# Patient Record
Sex: MALE | Race: OTHER | HISPANIC OR LATINO
[De-identification: names, ages, dates, MRNs, and addresses within clinical notes are randomized per-mention and may not be internally consistent; named-entity substitution may affect disease eponyms.]

---

## 2017-01-10 ENCOUNTER — RESULT CHARGE (OUTPATIENT)
Age: 42
End: 2017-01-10

## 2017-01-11 ENCOUNTER — APPOINTMENT (OUTPATIENT)
Dept: OTHER | Facility: CLINIC | Age: 42
End: 2017-01-11

## 2017-01-11 VITALS
BODY MASS INDEX: 34.72 KG/M2 | DIASTOLIC BLOOD PRESSURE: 104 MMHG | WEIGHT: 262 LBS | OXYGEN SATURATION: 97 % | HEIGHT: 73 IN | HEART RATE: 98 BPM | SYSTOLIC BLOOD PRESSURE: 155 MMHG

## 2017-01-12 ENCOUNTER — MEDICATION RENEWAL (OUTPATIENT)
Age: 42
End: 2017-01-12

## 2017-01-12 LAB
ALBUMIN SERPL ELPH-MCNC: 4.2 G/DL
ALP BLD-CCNC: 80 U/L
ALT SERPL-CCNC: 38 U/L
ANION GAP SERPL CALC-SCNC: 16 MMOL/L
APPEARANCE: CLEAR
AST SERPL-CCNC: 51 U/L
BASOPHILS # BLD AUTO: 0.01 K/UL
BASOPHILS NFR BLD AUTO: 0.2 %
BILIRUB SERPL-MCNC: 0.2 MG/DL
BILIRUBIN URINE: NEGATIVE
BLOOD URINE: NEGATIVE
BUN SERPL-MCNC: 10 MG/DL
CALCIUM SERPL-MCNC: 9.4 MG/DL
CHLORIDE SERPL-SCNC: 99 MMOL/L
CHOLEST SERPL-MCNC: 227 MG/DL
CHOLEST/HDLC SERPL: 5.7 RATIO
CO2 SERPL-SCNC: 23 MMOL/L
COLOR: YELLOW
CREAT SERPL-MCNC: 1.36 MG/DL
EOSINOPHIL # BLD AUTO: 0.03 K/UL
EOSINOPHIL NFR BLD AUTO: 0.5 %
GLUCOSE QUALITATIVE U: NORMAL MG/DL
GLUCOSE SERPL-MCNC: 76 MG/DL
HCT VFR BLD CALC: 52 %
HDLC SERPL-MCNC: 40 MG/DL
HGB BLD-MCNC: 17.4 G/DL
IMM GRANULOCYTES NFR BLD AUTO: 0.2 %
KETONES URINE: NEGATIVE
LDLC SERPL CALC-MCNC: 167 MG/DL
LEUKOCYTE ESTERASE URINE: NEGATIVE
LYMPHOCYTES # BLD AUTO: 1.12 K/UL
LYMPHOCYTES NFR BLD AUTO: 18.6 %
MAN DIFF?: NORMAL
MCHC RBC-ENTMCNC: 28.8 PG
MCHC RBC-ENTMCNC: 33.5 GM/DL
MCV RBC AUTO: 86.1 FL
MONOCYTES # BLD AUTO: 0.77 K/UL
MONOCYTES NFR BLD AUTO: 12.8 %
NEUTROPHILS # BLD AUTO: 4.08 K/UL
NEUTROPHILS NFR BLD AUTO: 67.7 %
NITRITE URINE: NEGATIVE
PH URINE: 7
PLATELET # BLD AUTO: 228 K/UL
POTASSIUM SERPL-SCNC: 4.6 MMOL/L
PROT SERPL-MCNC: 6.7 G/DL
PROTEIN URINE: NEGATIVE MG/DL
RBC # BLD: 6.04 M/UL
RBC # FLD: 14.8 %
SODIUM SERPL-SCNC: 138 MMOL/L
SPECIFIC GRAVITY URINE: 1.02
TRIGL SERPL-MCNC: 98 MG/DL
TSH SERPL-ACNC: 3.7 UIU/ML
UROBILINOGEN URINE: NORMAL MG/DL
WBC # FLD AUTO: 6.02 K/UL

## 2017-01-19 ENCOUNTER — APPOINTMENT (OUTPATIENT)
Dept: SURGERY | Facility: CLINIC | Age: 42
End: 2017-01-19

## 2017-01-25 ENCOUNTER — OTHER (OUTPATIENT)
Age: 42
End: 2017-01-25

## 2017-01-25 ENCOUNTER — OUTPATIENT (OUTPATIENT)
Dept: OUTPATIENT SERVICES | Facility: HOSPITAL | Age: 42
LOS: 1 days | Discharge: HOME | End: 2017-01-25

## 2017-01-25 ENCOUNTER — RESULT REVIEW (OUTPATIENT)
Age: 42
End: 2017-01-25

## 2017-01-25 DIAGNOSIS — G50.0 TRIGEMINAL NEURALGIA: ICD-10-CM

## 2017-01-26 ENCOUNTER — APPOINTMENT (OUTPATIENT)
Dept: HEMATOLOGY ONCOLOGY | Facility: CLINIC | Age: 42
End: 2017-01-26

## 2017-01-26 ENCOUNTER — LABORATORY RESULT (OUTPATIENT)
Age: 42
End: 2017-01-26

## 2017-01-26 VITALS
DIASTOLIC BLOOD PRESSURE: 80 MMHG | BODY MASS INDEX: 34.32 KG/M2 | OXYGEN SATURATION: 97 % | WEIGHT: 260.15 LBS | HEART RATE: 89 BPM | TEMPERATURE: 97.5 F | RESPIRATION RATE: 16 BRPM | SYSTOLIC BLOOD PRESSURE: 138 MMHG

## 2017-01-26 LAB
HCT VFR BLD CALC: 54.9 % — HIGH (ref 39–50)
HGB BLD-MCNC: 17.8 G/DL — HIGH (ref 13–17)
MCHC RBC-ENTMCNC: 27.9 PG — SIGNIFICANT CHANGE UP (ref 27–34)
MCHC RBC-ENTMCNC: 32.4 GM/DL — SIGNIFICANT CHANGE UP (ref 32–36)
MCV RBC AUTO: 86.2 FL — SIGNIFICANT CHANGE UP (ref 80–100)
PLATELET # BLD AUTO: 239 K/UL — SIGNIFICANT CHANGE UP (ref 150–400)
RBC # BLD: 6.37 M/UL — HIGH (ref 4.2–5.8)
RBC # FLD: 12.9 % — SIGNIFICANT CHANGE UP (ref 10.3–14.5)
WBC # BLD: 5.1 K/UL — SIGNIFICANT CHANGE UP (ref 3.8–10.5)
WBC # FLD AUTO: 5.1 K/UL — SIGNIFICANT CHANGE UP (ref 3.8–10.5)

## 2017-02-01 ENCOUNTER — FORM ENCOUNTER (OUTPATIENT)
Age: 42
End: 2017-02-01

## 2017-02-28 ENCOUNTER — MESSAGE (OUTPATIENT)
Age: 42
End: 2017-02-28

## 2017-03-01 ENCOUNTER — MESSAGE (OUTPATIENT)
Age: 42
End: 2017-03-01

## 2017-04-18 ENCOUNTER — APPOINTMENT (OUTPATIENT)
Dept: ENDOCRINOLOGY | Facility: CLINIC | Age: 42
End: 2017-04-18

## 2017-04-18 VITALS
DIASTOLIC BLOOD PRESSURE: 80 MMHG | HEIGHT: 73 IN | OXYGEN SATURATION: 97 % | BODY MASS INDEX: 33.93 KG/M2 | SYSTOLIC BLOOD PRESSURE: 120 MMHG | HEART RATE: 88 BPM | WEIGHT: 256 LBS

## 2017-04-18 RX ORDER — LOSARTAN POTASSIUM AND HYDROCHLOROTHIAZIDE 100; 12.5 MG/1; MG/1
TABLET, FILM COATED ORAL
Refills: 0 | Status: DISCONTINUED | COMMUNITY
End: 2017-04-18

## 2017-04-18 RX ORDER — METOPROLOL TARTRATE 75 MG/1
TABLET, FILM COATED ORAL
Refills: 0 | Status: DISCONTINUED | COMMUNITY
End: 2017-04-18

## 2017-05-19 ENCOUNTER — RX RENEWAL (OUTPATIENT)
Age: 42
End: 2017-05-19

## 2017-05-22 ENCOUNTER — RX RENEWAL (OUTPATIENT)
Age: 42
End: 2017-05-22

## 2017-06-27 ENCOUNTER — APPOINTMENT (OUTPATIENT)
Dept: SURGERY | Facility: CLINIC | Age: 42
End: 2017-06-27

## 2017-07-12 ENCOUNTER — APPOINTMENT (OUTPATIENT)
Dept: OTHER | Facility: CLINIC | Age: 42
End: 2017-07-12

## 2017-07-12 VITALS
WEIGHT: 256 LBS | OXYGEN SATURATION: 96 % | HEART RATE: 91 BPM | DIASTOLIC BLOOD PRESSURE: 90 MMHG | SYSTOLIC BLOOD PRESSURE: 136 MMHG | BODY MASS INDEX: 33.93 KG/M2 | HEIGHT: 73 IN

## 2017-07-12 VITALS — SYSTOLIC BLOOD PRESSURE: 142 MMHG | DIASTOLIC BLOOD PRESSURE: 82 MMHG

## 2017-07-12 RX ORDER — LOSARTAN POTASSIUM AND HYDROCHLOROTHIAZIDE 12.5; 5 MG/1; MG/1
50-12.5 TABLET ORAL
Qty: 30 | Refills: 0 | Status: COMPLETED | COMMUNITY
Start: 2017-03-14

## 2017-07-12 RX ORDER — AMOXICILLIN 500 MG/1
500 CAPSULE ORAL
Qty: 10 | Refills: 0 | Status: COMPLETED | COMMUNITY
Start: 2017-01-28

## 2017-07-12 RX ORDER — AMOXICILLIN AND CLAVULANATE POTASSIUM 500; 125 MG/1; MG/1
500-125 TABLET, FILM COATED ORAL
Qty: 20 | Refills: 0 | Status: COMPLETED | COMMUNITY
Start: 2017-05-01

## 2017-07-12 RX ORDER — POLYETHYLENE GLYCOL-3350 AND ELECTROLYTES 236; 6.74; 5.86; 2.97; 22.74 G/274.31G; G/274.31G; G/274.31G; G/274.31G; G/274.31G
236 POWDER, FOR SOLUTION ORAL
Qty: 4000 | Refills: 0 | Status: COMPLETED | COMMUNITY
Start: 2017-05-02

## 2017-07-12 RX ORDER — TESTOSTERONE 30 MG/1.5ML
30 SOLUTION TOPICAL
Qty: 90 | Refills: 0 | Status: COMPLETED | COMMUNITY
Start: 2017-02-08

## 2017-07-12 RX ORDER — POLYETHYLENE GLYCOL 3350 17 G/17G
17 POWDER, FOR SOLUTION ORAL
Qty: 30 | Refills: 0 | Status: COMPLETED | COMMUNITY
Start: 2017-05-02

## 2017-07-31 ENCOUNTER — RX RENEWAL (OUTPATIENT)
Age: 42
End: 2017-07-31

## 2017-08-16 ENCOUNTER — OUTPATIENT (OUTPATIENT)
Dept: OUTPATIENT SERVICES | Facility: HOSPITAL | Age: 42
LOS: 1 days | Discharge: HOME | End: 2017-08-16

## 2017-08-16 DIAGNOSIS — G50.0 TRIGEMINAL NEURALGIA: ICD-10-CM

## 2017-08-17 ENCOUNTER — CLINICAL ADVICE (OUTPATIENT)
Age: 42
End: 2017-08-17

## 2017-08-18 ENCOUNTER — APPOINTMENT (OUTPATIENT)
Dept: HEMATOLOGY ONCOLOGY | Facility: CLINIC | Age: 42
End: 2017-08-18
Payer: COMMERCIAL

## 2017-08-18 VITALS
RESPIRATION RATE: 16 BRPM | TEMPERATURE: 97.9 F | SYSTOLIC BLOOD PRESSURE: 137 MMHG | HEART RATE: 82 BPM | DIASTOLIC BLOOD PRESSURE: 94 MMHG | OXYGEN SATURATION: 95 % | WEIGHT: 264.33 LBS | BODY MASS INDEX: 34.87 KG/M2

## 2017-08-18 DIAGNOSIS — G50.0 TRIGEMINAL NEURALGIA: ICD-10-CM

## 2017-08-18 PROCEDURE — 99215 OFFICE O/P EST HI 40 MIN: CPT

## 2017-08-29 ENCOUNTER — OTHER (OUTPATIENT)
Age: 42
End: 2017-08-29

## 2017-09-18 ENCOUNTER — RX RENEWAL (OUTPATIENT)
Age: 42
End: 2017-09-18

## 2017-09-19 ENCOUNTER — OUTPATIENT (OUTPATIENT)
Dept: OUTPATIENT SERVICES | Facility: HOSPITAL | Age: 42
LOS: 1 days | Discharge: HOME | End: 2017-09-19

## 2017-09-19 DIAGNOSIS — G50.0 TRIGEMINAL NEURALGIA: ICD-10-CM

## 2017-09-21 ENCOUNTER — APPOINTMENT (OUTPATIENT)
Dept: HEMATOLOGY ONCOLOGY | Facility: CLINIC | Age: 42
End: 2017-09-21
Payer: COMMERCIAL

## 2017-09-21 VITALS
HEART RATE: 90 BPM | TEMPERATURE: 97.8 F | WEIGHT: 270.95 LBS | BODY MASS INDEX: 35.75 KG/M2 | OXYGEN SATURATION: 96 % | SYSTOLIC BLOOD PRESSURE: 145 MMHG | RESPIRATION RATE: 16 BRPM | DIASTOLIC BLOOD PRESSURE: 84 MMHG

## 2017-09-21 DIAGNOSIS — R20.8 OTHER DISTURBANCES OF SKIN SENSATION: ICD-10-CM

## 2017-09-21 DIAGNOSIS — E29.1 TESTICULAR HYPOFUNCTION: ICD-10-CM

## 2017-09-21 PROCEDURE — 99215 OFFICE O/P EST HI 40 MIN: CPT

## 2017-10-03 PROBLEM — E29.1 TESTOSTERONE DEFICIENCY: Status: ACTIVE | Noted: 2017-04-18

## 2017-10-03 PROBLEM — R20.8 DYSESTHESIA: Status: ACTIVE | Noted: 2017-01-30

## 2017-10-10 ENCOUNTER — RX RENEWAL (OUTPATIENT)
Age: 42
End: 2017-10-10

## 2017-10-16 ENCOUNTER — APPOINTMENT (OUTPATIENT)
Dept: ENDOCRINOLOGY | Facility: CLINIC | Age: 42
End: 2017-10-16
Payer: COMMERCIAL

## 2017-10-16 VITALS
HEIGHT: 72 IN | DIASTOLIC BLOOD PRESSURE: 80 MMHG | BODY MASS INDEX: 36.44 KG/M2 | OXYGEN SATURATION: 98 % | WEIGHT: 269 LBS | HEART RATE: 80 BPM | SYSTOLIC BLOOD PRESSURE: 120 MMHG

## 2017-10-16 PROCEDURE — 76536 US EXAM OF HEAD AND NECK: CPT

## 2017-10-16 PROCEDURE — 99214 OFFICE O/P EST MOD 30 MIN: CPT | Mod: 25

## 2017-10-16 RX ORDER — CHORIONIC GONADOTROPIN 10000 UNIT
10000 KIT INTRAMUSCULAR
Qty: 2 | Refills: 0 | Status: COMPLETED | COMMUNITY
Start: 2017-03-02 | End: 2017-10-16

## 2017-10-16 RX ORDER — LEVOCETIRIZINE DIHYDROCHLORIDE 5 MG/1
5 TABLET ORAL
Qty: 30 | Refills: 0 | Status: COMPLETED | COMMUNITY
Start: 2017-05-03 | End: 2017-10-16

## 2017-10-16 RX ORDER — IBUPROFEN 800 MG/1
800 TABLET, FILM COATED ORAL
Qty: 60 | Refills: 0 | Status: DISCONTINUED | COMMUNITY
Start: 2017-10-10

## 2017-10-16 RX ORDER — DICLOFENAC SODIUM 10 MG/G
1 GEL TOPICAL
Qty: 300 | Refills: 0 | Status: DISCONTINUED | COMMUNITY
Start: 2017-09-29

## 2017-10-16 RX ORDER — CYCLOBENZAPRINE HYDROCHLORIDE 5 MG/1
5 TABLET, FILM COATED ORAL
Qty: 60 | Refills: 0 | Status: DISCONTINUED | COMMUNITY
Start: 2017-09-26

## 2017-10-18 ENCOUNTER — RX RENEWAL (OUTPATIENT)
Age: 42
End: 2017-10-18

## 2017-10-18 ENCOUNTER — RESULT REVIEW (OUTPATIENT)
Age: 42
End: 2017-10-18

## 2017-10-18 LAB
ACTH SER-ACNC: 18 PG/ML
CORTIS SERPL-MCNC: 8.4 UG/DL
FSH SERPL-MCNC: <0.1 IU/L
IGF-1 INTERP: NORMAL
IGF-I BLD-MCNC: 150 NG/ML
LH SERPL-ACNC: <0.1 IU/L
PROLACTIN SERPL-MCNC: 14.7 NG/ML
T4 FREE SERPL-MCNC: 1.4 NG/DL
TESTOST SERPL-MCNC: 64.4 NG/DL
TSH SERPL-ACNC: 4.86 UIU/ML

## 2017-10-20 LAB
MONOMERIC PROLACTIN (ICMA)*: 9 NG/ML
PERCENT MACROPROLACTIN: 59 %
PROLACTIN, SERUM (ICMA)*: 22 NG/ML

## 2017-12-12 ENCOUNTER — OTHER (OUTPATIENT)
Age: 42
End: 2017-12-12

## 2017-12-12 ENCOUNTER — RX RENEWAL (OUTPATIENT)
Age: 42
End: 2017-12-12

## 2017-12-19 ENCOUNTER — APPOINTMENT (OUTPATIENT)
Dept: SURGERY | Facility: CLINIC | Age: 42
End: 2017-12-19
Payer: COMMERCIAL

## 2017-12-19 PROCEDURE — 99213 OFFICE O/P EST LOW 20 MIN: CPT

## 2017-12-20 ENCOUNTER — APPOINTMENT (OUTPATIENT)
Dept: OTHER | Facility: CLINIC | Age: 42
End: 2017-12-20
Payer: COMMERCIAL

## 2017-12-20 VITALS — DIASTOLIC BLOOD PRESSURE: 110 MMHG | SYSTOLIC BLOOD PRESSURE: 130 MMHG

## 2017-12-20 VITALS
HEIGHT: 73 IN | WEIGHT: 260 LBS | DIASTOLIC BLOOD PRESSURE: 99 MMHG | SYSTOLIC BLOOD PRESSURE: 144 MMHG | HEART RATE: 83 BPM | OXYGEN SATURATION: 97 % | RESPIRATION RATE: 14 BRPM | BODY MASS INDEX: 34.46 KG/M2

## 2017-12-20 PROCEDURE — 94010 BREATHING CAPACITY TEST: CPT

## 2017-12-20 PROCEDURE — 96150: CPT

## 2017-12-20 PROCEDURE — 99214 OFFICE O/P EST MOD 30 MIN: CPT | Mod: 25

## 2017-12-20 PROCEDURE — 99396 PREV VISIT EST AGE 40-64: CPT

## 2017-12-20 RX ORDER — LEVOFLOXACIN 500 MG/1
500 TABLET, FILM COATED ORAL
Qty: 7 | Refills: 0 | Status: COMPLETED | COMMUNITY
Start: 2017-12-07 | End: 2017-12-20

## 2017-12-20 RX ORDER — PREDNISONE 20 MG/1
20 TABLET ORAL
Qty: 30 | Refills: 0 | Status: COMPLETED | COMMUNITY
Start: 2017-11-07 | End: 2017-12-20

## 2017-12-21 LAB
ALBUMIN SERPL ELPH-MCNC: 4.5 G/DL
ALP BLD-CCNC: 81 U/L
ALT SERPL-CCNC: 28 U/L
ANION GAP SERPL CALC-SCNC: 12 MMOL/L
APPEARANCE: CLEAR
AST SERPL-CCNC: 29 U/L
BASOPHILS # BLD AUTO: 0.02 K/UL
BASOPHILS NFR BLD AUTO: 0.5 %
BILIRUB SERPL-MCNC: 0.3 MG/DL
BILIRUBIN URINE: NEGATIVE
BLOOD URINE: NEGATIVE
BUN SERPL-MCNC: 15 MG/DL
CALCIUM SERPL-MCNC: 9.8 MG/DL
CHLORIDE SERPL-SCNC: 101 MMOL/L
CHOLEST SERPL-MCNC: 241 MG/DL
CHOLEST/HDLC SERPL: 5.5 RATIO
CO2 SERPL-SCNC: 29 MMOL/L
COLOR: YELLOW
CREAT SERPL-MCNC: 1.28 MG/DL
EOSINOPHIL # BLD AUTO: 0.04 K/UL
EOSINOPHIL NFR BLD AUTO: 0.9
GLUCOSE QUALITATIVE U: NEGATIVE MG/DL
GLUCOSE SERPL-MCNC: 94 MG/DL
HCT VFR BLD CALC: 53.3 %
HDLC SERPL-MCNC: 44 MG/DL
HGB BLD-MCNC: 17.7 G/DL
IMM GRANULOCYTES NFR BLD AUTO: 0.2 %
KETONES URINE: NEGATIVE
LDLC SERPL CALC-MCNC: 182 MG/DL
LEUKOCYTE ESTERASE URINE: NEGATIVE
LYMPHOCYTES # BLD AUTO: 1.14 K/UL
LYMPHOCYTES NFR BLD AUTO: 27 %
MAN DIFF?: NORMAL
MCHC RBC-ENTMCNC: 29.1 PG
MCHC RBC-ENTMCNC: 33.2 GM/DL
MCV RBC AUTO: 87.7 FL
MONOCYTES # BLD AUTO: 0.37 K/UL
MONOCYTES NFR BLD AUTO: 8.7 %
NEUTROPHILS # BLD AUTO: 2.65 K/UL
NEUTROPHILS NFR BLD AUTO: 62.7 %
NITRITE URINE: NEGATIVE
PH URINE: 7
PLATELET # BLD AUTO: 202 K/UL
POTASSIUM SERPL-SCNC: 4.8 MMOL/L
PROT SERPL-MCNC: 7 G/DL
PROTEIN URINE: NEGATIVE MG/DL
RBC # BLD: 6.08 M/UL
RBC # FLD: 15.3 %
SODIUM SERPL-SCNC: 142 MMOL/L
SPECIFIC GRAVITY URINE: 1.02
TRIGL SERPL-MCNC: 77 MG/DL
UROBILINOGEN URINE: NEGATIVE MG/DL
WBC # FLD AUTO: 4.23 K/UL

## 2018-01-12 ENCOUNTER — APPOINTMENT (OUTPATIENT)
Dept: PSYCHIATRY | Facility: CLINIC | Age: 43
End: 2018-01-12
Payer: COMMERCIAL

## 2018-01-12 PROCEDURE — 90791 PSYCH DIAGNOSTIC EVALUATION: CPT

## 2018-03-12 ENCOUNTER — RX RENEWAL (OUTPATIENT)
Age: 43
End: 2018-03-12

## 2018-04-09 ENCOUNTER — APPOINTMENT (OUTPATIENT)
Dept: ENDOCRINOLOGY | Facility: CLINIC | Age: 43
End: 2018-04-09
Payer: COMMERCIAL

## 2018-04-09 VITALS
WEIGHT: 261 LBS | BODY MASS INDEX: 34.59 KG/M2 | HEIGHT: 73 IN | DIASTOLIC BLOOD PRESSURE: 80 MMHG | OXYGEN SATURATION: 99 % | HEART RATE: 94 BPM | SYSTOLIC BLOOD PRESSURE: 120 MMHG

## 2018-04-09 PROCEDURE — 99214 OFFICE O/P EST MOD 30 MIN: CPT

## 2018-04-09 RX ORDER — LEVOTHYROXINE SODIUM 0.1 MG/1
100 TABLET ORAL
Qty: 30 | Refills: 0 | Status: DISCONTINUED | COMMUNITY
Start: 2017-09-18

## 2018-04-09 RX ORDER — ZOLPIDEM TARTRATE 10 MG/1
10 TABLET ORAL
Qty: 30 | Refills: 0 | Status: DISCONTINUED | COMMUNITY
Start: 2018-02-01

## 2018-04-09 RX ORDER — PREDNISONE 50 MG/1
50 TABLET ORAL
Qty: 8 | Refills: 0 | Status: DISCONTINUED | COMMUNITY
Start: 2018-03-01

## 2018-04-09 RX ORDER — AZITHROMYCIN 250 MG/1
250 TABLET, FILM COATED ORAL
Qty: 6 | Refills: 0 | Status: DISCONTINUED | COMMUNITY
Start: 2018-02-01

## 2018-04-09 RX ORDER — CLOBETASOL PROPIONATE 0.5 MG/G
0.05 OINTMENT TOPICAL
Qty: 60 | Refills: 0 | Status: DISCONTINUED | COMMUNITY
Start: 2018-02-20

## 2018-04-09 RX ORDER — PROMETHAZINE HYDROCHLORIDE AND CODEINE PHOSPHATE 6.25; 1 MG/5ML; MG/5ML
6.25-1 SOLUTION ORAL
Qty: 100 | Refills: 0 | Status: DISCONTINUED | COMMUNITY
Start: 2018-02-01

## 2018-04-09 RX ORDER — MIRTAZAPINE 45 MG/1
45 TABLET, FILM COATED ORAL
Qty: 30 | Refills: 0 | Status: DISCONTINUED | COMMUNITY
Start: 2018-03-02

## 2018-04-12 ENCOUNTER — RX RENEWAL (OUTPATIENT)
Age: 43
End: 2018-04-12

## 2018-04-12 LAB
T4 FREE SERPL-MCNC: 1.4 NG/DL
TSH SERPL-ACNC: 4.31 UIU/ML

## 2018-04-24 ENCOUNTER — OTHER (OUTPATIENT)
Age: 43
End: 2018-04-24

## 2018-05-07 ENCOUNTER — OTHER (OUTPATIENT)
Age: 43
End: 2018-05-07

## 2018-05-16 ENCOUNTER — RX RENEWAL (OUTPATIENT)
Age: 43
End: 2018-05-16

## 2018-06-15 ENCOUNTER — APPOINTMENT (OUTPATIENT)
Dept: OTHER | Facility: CLINIC | Age: 43
End: 2018-06-15
Payer: COMMERCIAL

## 2018-06-15 VITALS
OXYGEN SATURATION: 98 % | HEART RATE: 76 BPM | RESPIRATION RATE: 14 BRPM | HEIGHT: 73 IN | BODY MASS INDEX: 33.8 KG/M2 | DIASTOLIC BLOOD PRESSURE: 79 MMHG | SYSTOLIC BLOOD PRESSURE: 115 MMHG | WEIGHT: 255 LBS

## 2018-06-15 DIAGNOSIS — C44.329 SQUAMOUS CELL CARCINOMA OF SKIN OF OTHER PARTS OF FACE: ICD-10-CM

## 2018-06-15 DIAGNOSIS — Z03.89 ENCOUNTER FOR OBSERVATION FOR OTHER SUSPECTED DISEASES AND CONDITIONS RULED OUT: ICD-10-CM

## 2018-06-15 PROCEDURE — 99215 OFFICE O/P EST HI 40 MIN: CPT

## 2018-06-15 RX ORDER — CARBAMAZEPINE 200 MG/1
200 TABLET ORAL
Qty: 10 | Refills: 0 | Status: COMPLETED | COMMUNITY
Start: 2018-05-16 | End: 2018-06-15

## 2018-06-15 RX ORDER — LEVOTHYROXINE SODIUM 0.11 MG/1
112 TABLET ORAL
Qty: 30 | Refills: 0 | Status: COMPLETED | COMMUNITY
Start: 2017-10-18

## 2018-06-15 RX ORDER — TESTOSTERONE CYPIONATE 200 MG/ML
200 INJECTION, SOLUTION INTRAMUSCULAR
Refills: 0 | Status: COMPLETED | COMMUNITY
End: 2018-06-15

## 2018-06-15 RX ORDER — METHYLPREDNISOLONE 4 MG/1
4 TABLET ORAL
Qty: 21 | Refills: 0 | Status: COMPLETED | COMMUNITY
Start: 2018-05-09

## 2018-06-19 ENCOUNTER — APPOINTMENT (OUTPATIENT)
Dept: SURGERY | Facility: CLINIC | Age: 43
End: 2018-06-19
Payer: COMMERCIAL

## 2018-06-19 PROCEDURE — 31575 DIAGNOSTIC LARYNGOSCOPY: CPT

## 2018-06-19 PROCEDURE — 99214 OFFICE O/P EST MOD 30 MIN: CPT | Mod: 25

## 2018-09-18 ENCOUNTER — APPOINTMENT (OUTPATIENT)
Dept: ENDOCRINOLOGY | Facility: CLINIC | Age: 43
End: 2018-09-18
Payer: COMMERCIAL

## 2018-09-18 VITALS
OXYGEN SATURATION: 98 % | DIASTOLIC BLOOD PRESSURE: 70 MMHG | WEIGHT: 249 LBS | BODY MASS INDEX: 33 KG/M2 | SYSTOLIC BLOOD PRESSURE: 120 MMHG | HEIGHT: 73 IN | HEART RATE: 87 BPM

## 2018-09-18 PROCEDURE — 76536 US EXAM OF HEAD AND NECK: CPT

## 2018-09-18 PROCEDURE — 99214 OFFICE O/P EST MOD 30 MIN: CPT | Mod: 25

## 2018-09-18 RX ORDER — CHORIONIC GONADOTROPIN 10000 UNIT
10000 KIT INTRAMUSCULAR
Refills: 0 | Status: COMPLETED | COMMUNITY
End: 2018-09-18

## 2018-09-26 ENCOUNTER — RX RENEWAL (OUTPATIENT)
Age: 43
End: 2018-09-26

## 2018-09-26 LAB
ALBUMIN SERPL ELPH-MCNC: 4.7 G/DL
ALP BLD-CCNC: 91 U/L
ALT SERPL-CCNC: 54 U/L
ANION GAP SERPL CALC-SCNC: 12 MMOL/L
AST SERPL-CCNC: 41 U/L
BASOPHILS # BLD AUTO: 0.01 K/UL
BASOPHILS NFR BLD AUTO: 0.2 %
BILIRUB SERPL-MCNC: 0.3 MG/DL
BUN SERPL-MCNC: 18 MG/DL
CALCIUM SERPL-MCNC: 10.2 MG/DL
CHLORIDE SERPL-SCNC: 101 MMOL/L
CHOLEST SERPL-MCNC: 211 MG/DL
CHOLEST/HDLC SERPL: 4 RATIO
CO2 SERPL-SCNC: 29 MMOL/L
CREAT SERPL-MCNC: 1.66 MG/DL
EOSINOPHIL # BLD AUTO: 0.08 K/UL
EOSINOPHIL NFR BLD AUTO: 1.6 %
FSH SERPL-MCNC: 3.4 IU/L
GLUCOSE SERPL-MCNC: 78 MG/DL
HBA1C MFR BLD HPLC: 5.3 %
HCT VFR BLD CALC: 51.6 %
HDLC SERPL-MCNC: 53 MG/DL
HGB BLD-MCNC: 17 G/DL
IMM GRANULOCYTES NFR BLD AUTO: 0.2 %
LDLC SERPL CALC-MCNC: 131 MG/DL
LH SERPL-ACNC: 5 IU/L
LYMPHOCYTES # BLD AUTO: 1.62 K/UL
LYMPHOCYTES NFR BLD AUTO: 32.2 %
MAN DIFF?: NORMAL
MCHC RBC-ENTMCNC: 29.3 PG
MCHC RBC-ENTMCNC: 32.9 GM/DL
MCV RBC AUTO: 88.8 FL
MONOCYTES # BLD AUTO: 0.43 K/UL
MONOCYTES NFR BLD AUTO: 8.5 %
NEUTROPHILS # BLD AUTO: 2.88 K/UL
NEUTROPHILS NFR BLD AUTO: 57.3 %
PLATELET # BLD AUTO: 227 K/UL
POTASSIUM SERPL-SCNC: 4.4 MMOL/L
PROT SERPL-MCNC: 7.4 G/DL
RBC # BLD: 5.81 M/UL
RBC # FLD: 14.1 %
SHBG SERPL-SCNC: 21 NMOL/L
SODIUM SERPL-SCNC: 142 MMOL/L
T4 FREE SERPL-MCNC: 1.8 NG/DL
TESTOST SERPL-MCNC: 230.9 NG/DL
TRIGL SERPL-MCNC: 137 MG/DL
TSH SERPL-ACNC: 4.15 UIU/ML
WBC # FLD AUTO: 5.03 K/UL

## 2018-10-15 ENCOUNTER — RX RENEWAL (OUTPATIENT)
Age: 43
End: 2018-10-15

## 2018-11-28 ENCOUNTER — APPOINTMENT (OUTPATIENT)
Dept: OTHER | Facility: CLINIC | Age: 43
End: 2018-11-28
Payer: COMMERCIAL

## 2018-11-28 VITALS
SYSTOLIC BLOOD PRESSURE: 130 MMHG | HEIGHT: 73 IN | OXYGEN SATURATION: 98 % | BODY MASS INDEX: 33.66 KG/M2 | RESPIRATION RATE: 16 BRPM | HEART RATE: 103 BPM | DIASTOLIC BLOOD PRESSURE: 100 MMHG | WEIGHT: 254 LBS

## 2018-11-28 PROCEDURE — 99214 OFFICE O/P EST MOD 30 MIN: CPT | Mod: 25

## 2018-11-28 PROCEDURE — 94010 BREATHING CAPACITY TEST: CPT

## 2018-11-28 PROCEDURE — 96150: CPT

## 2018-11-28 PROCEDURE — 99396 PREV VISIT EST AGE 40-64: CPT | Mod: 25

## 2018-11-28 RX ORDER — PENTOXIFYLLINE 400 MG/1
400 TABLET, EXTENDED RELEASE ORAL
Qty: 60 | Refills: 0 | Status: COMPLETED | COMMUNITY
Start: 2018-05-30 | End: 2018-11-28

## 2018-11-28 RX ORDER — BACLOFEN 10 MG/1
10 TABLET ORAL
Refills: 0 | Status: ACTIVE | COMMUNITY

## 2018-11-28 RX ORDER — 1.1% SODIUM FLUORIDE PRESCRIPTION DENTAL CREAM 5 MG/G
1.1 CREAM DENTAL
Qty: 51 | Refills: 0 | Status: ACTIVE | COMMUNITY
Start: 2018-11-13

## 2018-11-29 ENCOUNTER — FORM ENCOUNTER (OUTPATIENT)
Age: 43
End: 2018-11-29

## 2018-12-03 LAB
ALBUMIN SERPL ELPH-MCNC: 4.3 G/DL
ALP BLD-CCNC: 70 U/L
ALT SERPL-CCNC: 22 U/L
ANION GAP SERPL CALC-SCNC: 9 MMOL/L
APPEARANCE: CLEAR
AST SERPL-CCNC: 27 U/L
BACTERIA: NEGATIVE
BASOPHILS # BLD AUTO: 0.01 K/UL
BASOPHILS NFR BLD AUTO: 0.2 %
BILIRUB SERPL-MCNC: 0.4 MG/DL
BILIRUBIN URINE: NEGATIVE
BLOOD URINE: NEGATIVE
BUN SERPL-MCNC: 18 MG/DL
CALCIUM SERPL-MCNC: 9.6 MG/DL
CHLORIDE SERPL-SCNC: 105 MMOL/L
CHOLEST SERPL-MCNC: 193 MG/DL
CHOLEST/HDLC SERPL: 4.1 RATIO
CO2 SERPL-SCNC: 30 MMOL/L
COLOR: YELLOW
CREAT SERPL-MCNC: 1.35 MG/DL
EOSINOPHIL # BLD AUTO: 0.03 K/UL
EOSINOPHIL NFR BLD AUTO: 0.7 %
GLUCOSE QUALITATIVE U: NEGATIVE MG/DL
GLUCOSE SERPL-MCNC: 91 MG/DL
HCT VFR BLD CALC: 51.7 %
HDLC SERPL-MCNC: 47 MG/DL
HGB BLD-MCNC: 16.9 G/DL
HYALINE CASTS: 1 /LPF
IMM GRANULOCYTES NFR BLD AUTO: 0.2 %
KETONES URINE: NEGATIVE
LDLC SERPL CALC-MCNC: 123 MG/DL
LEUKOCYTE ESTERASE URINE: NEGATIVE
LYMPHOCYTES # BLD AUTO: 1.33 K/UL
LYMPHOCYTES NFR BLD AUTO: 30.6 %
MAN DIFF?: NORMAL
MCHC RBC-ENTMCNC: 28.7 PG
MCHC RBC-ENTMCNC: 32.7 GM/DL
MCV RBC AUTO: 87.9 FL
MICROSCOPIC-UA: NORMAL
MONOCYTES # BLD AUTO: 0.34 K/UL
MONOCYTES NFR BLD AUTO: 7.8 %
NEUTROPHILS # BLD AUTO: 2.62 K/UL
NEUTROPHILS NFR BLD AUTO: 60.5 %
NITRITE URINE: NEGATIVE
PH URINE: 6
PLATELET # BLD AUTO: 185 K/UL
POTASSIUM SERPL-SCNC: 4.5 MMOL/L
PROT SERPL-MCNC: 6.4 G/DL
PROTEIN URINE: NEGATIVE MG/DL
RBC # BLD: 5.88 M/UL
RBC # FLD: 13.9 %
RED BLOOD CELLS URINE: 2 /HPF
SODIUM SERPL-SCNC: 144 MMOL/L
SPECIFIC GRAVITY URINE: 1.02
SQUAMOUS EPITHELIAL CELLS: 0 /HPF
TRIGL SERPL-MCNC: 114 MG/DL
UROBILINOGEN URINE: NEGATIVE MG/DL
WBC # FLD AUTO: 4.34 K/UL
WHITE BLOOD CELLS URINE: 1 /HPF

## 2018-12-20 ENCOUNTER — APPOINTMENT (OUTPATIENT)
Dept: SURGERY | Facility: CLINIC | Age: 43
End: 2018-12-20
Payer: COMMERCIAL

## 2018-12-20 PROCEDURE — 99213 OFFICE O/P EST LOW 20 MIN: CPT

## 2019-01-14 ENCOUNTER — APPOINTMENT (OUTPATIENT)
Dept: ENDOCRINOLOGY | Facility: CLINIC | Age: 44
End: 2019-01-14
Payer: COMMERCIAL

## 2019-01-14 VITALS
WEIGHT: 251 LBS | OXYGEN SATURATION: 98 % | SYSTOLIC BLOOD PRESSURE: 120 MMHG | HEART RATE: 97 BPM | DIASTOLIC BLOOD PRESSURE: 80 MMHG | BODY MASS INDEX: 33.12 KG/M2

## 2019-01-14 PROCEDURE — 99214 OFFICE O/P EST MOD 30 MIN: CPT

## 2019-01-14 NOTE — DATA REVIEWED
[FreeTextEntry1] : Labs 11/28/18:\par Cr 1.35\par CMP normal\par \par HDL 47\par Chol 193\par Trig 114\par \par Thyroid Ultrasound Report 9/18/18:\par \par Technique: multiple real time longitudinal and transverse images were obtained using a high resolution ultrasound with a linear transducer, [employer] e 2008 model, 10-12 MHz frequencies. All measurements will be reported as longitudinal x scott-posterior x transverse. \par Comparison: Report dated 10/2017. \par \par Indication: multinodular goiter. \par \par Findings: \par The right thyroid lobe measures 3.23 x 1.98 x 1.40 cm. The left thyroid lobe measures 3.51 x 1.44 x 1.66 cm. The isthmus measures 0.31 cm. \par The right thyroid lobe has a heterogeneous parenchyma. \par The left thyroid lobe has a heterogeneous parenchyma . \par  \par In the right lower pole there is a  predominantly solid, with isoechoic solid component. It measures 1.06 x 0.83 x 0.94 cm. The nodule is round in shape and the border is smooth. The nodule has a halo. It demonstrates macrocalcifications. It has peripheral and scant internal vascularity. \par \par In the left lower pole there is a  mixed, predominantly solid, with isoechoic solid component. It measures 1.05 x 0.74 x 0.84 cm. The nodule is round in shape and the border is smooth. The nodule has a halo. It demonstrates macrocalcifications. It has peripheral and scant internal vascularity. \par \par In the right mid pole there is a  predominantly solid. It measures 1.11 x 0.70 x 0.81 cm. The nodule is ovoid in shape and the border is smooth. The nodule has a halo. It demonstrates no calcifications. It has peripheral vascularity. \par \par In the right mid pole there is a  mixed. It measures 0.83 x 0.82 x 1.05 cm. The nodule is round in shape and the border is smooth. The nodule has a halo. It has peripheral vascularity. \par \par \par Labs 4/2018:\par TSH 4.31\par Free T4 1.4\par \par Labs 12/2017\par Hb 17.7\par CMP normal\par \par HDL 44\par Chol 241\par Trig 77\par \par Labs 10/2017:\par Cortisol 8.4\par TSH 4.86\par Free T4 1.4\par ACTH 18\par Prolactin 14.7\par FSH <0.1\par LH < 0.1\par IGF-1 150\par Testosterone 64.4\par \par \par Thyroid Ultrasound Report 10/2017\par \par Technique: multiple real time longitudinal and transverse images were obtained using a high resolution ultrasound with a linear transducer, FreeDrive e 2008 model, 10-12 MHz frequencies. All measurements will be reported as longitudinal x scott-posterior x transverse. \par Comparison: Report dated 10/2016. \par \par Indication: multinodular goiter. \par \par Findings: \par The right thyroid lobe measures 2.53 x 1.38 x 1.34 cm. The left thyroid lobe measures 3.41 x 1.18 x 0.99 cm. The isthmus measures 0.26 cm. \par The right thyroid lobe has a heterogeneous parenchyma. \par The left thyroid lobe has a heterogeneous parenchyma . \par  \par In the right lower pole there is a  predominantly solid, with isoechoic solid component. It measures 1.45 x 0.87 x 0.87 cm. The nodule is round in shape and the border is smooth. The nodule has a halo. It demonstrates macrocalcifications. It has peripheral and scant internal vascularity. \par \par In the left lower pole there is a  mixed, predominantly solid, with isoechoic solid component. It measures 0.66 x 0.75 x 0.85 cm. The nodule is round in shape and the border is smooth. The nodule has a halo. It demonstrates macrocalcifications. It has peripheral and scant internal vascularity. \par Labs 1/11/17:\par \par HDL 40\par Chol 227\par Trig 98\par Creatinine 1.36\par CMP otherwise normal except AST of 51\par TSH 3.70\par \par Pathology Right Lower Pole Thyroid Nodule 10/2016: Benign\par \par Thyroid Ultrasound Report 10/18/16:\par \par Technique: multiple real time longitudinal and transverse images were obtained using a high resolution ultrasound with a linear transducer, [employer] e 2008 model, 10-12 MHz frequencies. All measurements will be reported as longitudinal x scott-posterior x transverse. \par Comparison: Report dated 4/2016. \par \par Indication: multinodular goiter. \par \par Findings: \par The right thyroid lobe measures 2.61 x 1.90 x 1.54 cm. The left thyroid lobe measures 3.1 x 1.3 x 1.26 cm. The isthmus measures 0.29 cm. \par The right thyroid lobe has a heterogeneous parenchyma. \par The left thyroid lobe has a heterogeneous parenchyma . \par  \par In the right lower pole there is a  predominantly solid, with isoechoic solid component. It measures 1.56 x 0.9 x 1.2 cm. The nodule is round in shape and the border is smooth. The nodule has a halo. It demonstrates macrocalcifications. It has peripheral and scant internal vascularity. \par \par In the left lower pole there is a  mixed, predominantly solid, with isoechoic solid component. It measures 1.05 x 0.97 x 1.01 cm. The nodule is round in shape and the border is smooth. The nodule has a halo. It demonstrates macrocalcifications. It has peripheral and scant internal vascularity. \par \par Labs 7/2016:\par TSH 8.45\par Free T4 1.3\par TPO Ab neg.\par \par FNA Right Lower 4/2016: Benign\par \par Labs 3/29/16:\par TSH 7.08\par Free T4 1.4\par Anti-TPO Ab negative\par \par Thyroid Ultrasound Report 3/29/16:\par \par Technique: multiple real time longitudinal and transverse images were obtained using a high resolution ultrasound with a linear transducer, FreeDrive e 2008 model, 10-12 MHz frequencies. All measurements will be reported as longitudinal x scott-posterior x transverse. \par Comparison: None. \par \par Indication: thyroid nodule. \par \par Findings: \par The right thyroid lobe measures 2.37 x 1.44 x 1.62 cm. The left thyroid lobe measures 2.11 x 1.18 x 1.39 cm. The isthmus measures 0.32 cm. \par The right thyroid lobe has a heterogeneous parenchyma. \par The left thyroid lobe has a heterogeneous parenchyma . \par  \par In the right lower pole there is a  predominantly solid, with isoechoic solid component. It measures 0.87 x 0.89 x 0.94 cm. The nodule is round in shape and the border is smooth. The nodule has a halo. It demonstrates macrocalcifications. It has peripheral and scant internal vascularity. \par \par In the right lower pole there is a  predominantly solid, with isoechoic solid component. It measures 1.05 x 0.97 x 1.01 cm. The nodule is round in shape and the border is smooth. The nodule has a halo. It demonstrates macrocalcifications. It has peripheral and scant internal vascularity. \par

## 2019-01-14 NOTE — PROCEDURE
[[employer] e 2008 model, 10-12 MHz frequencies] : multiple real time longitudinal and transverse images were obtained using a high resolution ultrasound with a linear transducer, [employer] e 2008 model, 10-12 MHz frequencies. All measurements will be reported as longitudinal x scott-posterior x transverse. [Report dated ___] : Report dated [unfilled] [Multinodular Goiter] : multinodular goiter [] : a heterogeneous parenchyma  [Peripheral and scant  internal vas] : peripheral and scant internal vascularity [Left Thyroid] : left [Lower] : lower pole there is a  [Isoechoic solid component] : with isoechoic solid component [Macrocalcifications] : macrocalcifications [Peripheral and scant  internal vascularity] : peripheral and scant internal vascularity [Predominantly Solid] : predominantly solid [Ovoid] : ovoid in shape [No calcifications] : no calcifications [Right Thyroid] : right [Mid] : mid pole there is a  [Mixed] : mixed [Round] : round in shape [Smooth] : smooth [Yes] : has a halo [Peripheral vascularity] : peripheral vascularity [FreeTextEntry1] : 3.23 x 1.98 x 1.40 [FreeTextEntry5] : 3.51 x 1.44 x 1.66 [FreeTextEntry2] : 0.31 [FreeTextEntry3] : 0.83 x 0.82 x 1.05

## 2019-01-14 NOTE — HISTORY OF PRESENT ILLNESS
[FreeTextEntry1] : 42 y/o M diagnosed with thyroid nodules in 2013 which were found incidentally on MRI which was performed for facial pain s/p surgery for squamous cell cancer, also with possible facial nerve damage as he has been experiencing frequent facial spasms unrelieved by Botox. Seen initially by me 3/29/16 with thyroid U/S confirming 2 lower pole right thyroid nodules with microcalcifications largest 1.1cm. s/p FNA 10/2016 with benign findings. Last thyroid US 9/2018. Chemically with subclinical hypothyroidism at that time possibly secondary to radiation exposure from neck cancer so was started on levothyroxine 50 mcg daily. Dose increased to 88 mcg 7/2016. Dose again increased to 100 mcg after TSH found to be mildly elevated at 5.3 (10/2016). Repeat TFTs 1/2017 improved with TSH of 3.7.  TSH 4.86 dose increased to 112 mcg (10/2017) further increased to 125 mcg for TSH of 4.3 (4/2018). Last TSH 4.15 (9/2018). Takes Synthroid daily in am on empty stomach, eats breakfast 1 hour later. Denies missing doses. Denies current fevers, chills, night sweats, chest pain, SOB, abdominal pain, nausea, vomiting, diarrhea, constipation. Reports chronic dry skin on hands, but no recent changes. Reports heat and cold intolerance - unable to tolerate extremes in weather. Reports chronic dysphagia and chronic neck pain. +occasional palpitations. Had been exposed to [facility] site. Has a history of radiation to the neck for treatment for squamous cell cancer - last RT August 2016. +weight loss from not working out at the gym recently. \par \par Of note, patient states that he follows with a urologist for history of nephrolithiasis; had his testosterone levels check for complaint of low energy and found to have low level. Underwent MRI head for further evaluation, was told that his pituitary was affected by prior RT, but also has a hx of anabolic steroid use. Was started on HCG injections weekly at the end of March 2017. Was on testosterone injection 200 mg every 2 weeks. Not looking to have children at this time, but wife may want more children. Started working out more. Has noticed increased mood swings. Recommended trial of Clomid 9/2018 with testosterone level of 230.9. Now taking 25 mg every other day consistently. Has not noticed change in libido. Also with persistent fatigue and low energy.

## 2019-01-14 NOTE — REVIEW OF SYSTEMS
[Fatigue] : fatigue [Recent Weight Loss (___ Lbs)] : recent [unfilled] ~Ulb weight loss [Dysphagia] : dysphagia [Neck Pain] : neck pain [Decreased Libido] : decreased libido [Dry Skin] : dry skin [Cold Intolerance] : cold intolerant [Heat Intolerance] : heat intolerant [All other systems negative] : All other systems negative [Recent Weight Gain (___ Lbs)] : no recent weight gain [Fever] : no fever [Chills] : no chills [Chest Pain] : no chest pain [Palpitations] : no palpitations [Heart Rate Is Slow] : the heart rate was not slow [Heart Rate Is Fast] : the heart rate was not fast [Lower Ext Edema] : no lower extremity edema [Shortness Of Breath] : no shortness of breath [Cough] : no cough [Nausea] : no nausea [Vomiting] : no vomiting was observed [Constipation] : no constipation [Diarrhea] : no diarrhea [Abdominal Pain] : no abdominal pain [Polyuria] : no polyuria [Erectile Dysfunction] : no erectile dysfunction [Headache] : no headaches [Tremors] : no tremors [Dizziness] : no dizziness [de-identified] : chronic heat and cold intolerance

## 2019-01-14 NOTE — PHYSICAL EXAM
[Alert] : alert [No Acute Distress] : no acute distress [Well Nourished] : well nourished [Well Developed] : well developed [Normal Sclera/Conjunctiva] : normal sclera/conjunctiva [PERRL] : pupils equal, round and reactive to light [Normal Oropharynx] : the oropharynx was normal [Supple] : the neck was supple [Thyroid Not Enlarged] : the thyroid was not enlarged [No Respiratory Distress] : no respiratory distress [Normal Rate and Effort] : normal respiratory rhythm and effort [No Accessory Muscle Use] : no accessory muscle use [Clear to Auscultation] : lungs were clear to auscultation bilaterally [Normal Rate] : heart rate was normal  [Normal S1, S2] : normal S1 and S2 [Regular Rhythm] : with a regular rhythm [No Edema] : there was no peripheral edema [Not Tender] : non-tender [Soft] : abdomen soft [Not Distended] : not distended [No Masses] : no abdominal mass palpated [No Stigmata of Cushings Syndrome] : no stigmata of cushings syndrome [Normal Gait] : normal gait [No Clubbing, Cyanosis] : no clubbing  or cyanosis of the fingernails [Normal Strength/Tone] : muscle strength and tone were normal [No Motor Deficits] : the motor exam was normal [No Tremors] : no tremors [Oriented x3] : oriented to person, place, and time [Normal Affect] : the affect was normal [Normal Mood] : the mood was normal [Acanthosis Nigricans] : no acanthosis nigricans [de-identified] : surgical scars presents, stiff thyroid gland, scar tissue present along left aspect of face and neck

## 2019-01-14 NOTE — ASSESSMENT
[FreeTextEntry1] : 44 y/o M w/multinodular goiter on Synthroid of radiation induced hypothyroidism here for follow up.\par \par 1. Multinodular Goiter- Thyroid U/S FNA  performed 10/2016 for increased nodule size- results revealed multinodular goiter. Repeat thyroid US performed 10/2017 and 9/2018 with stable nodules (see results section for full description). Repeat thyroid US 9/2019 to assess for stability in size. \par \par 2. Hypothyroidism- started on levothyroxine. Now on Synthroid 125 mcg. Last TFTs 9/2018 TSH at goal. Will recheck TFTs now with some possible symptoms of hyperthyroidism. C/w Synthroid 125 will adjust as needed. \par \par 3. Hypogonadism- s/p HCG now on testosterone 2x/month. Likely due to hx of anabolic steroid abuse in the past. Check LH, FSH, testosterone now on Clomid. Will adjust dose if needed.  \par \par

## 2019-01-18 LAB
25(OH)D3 SERPL-MCNC: 32 NG/ML
ALBUMIN SERPL ELPH-MCNC: 4.3 G/DL
ALP BLD-CCNC: 80 U/L
ALT SERPL-CCNC: 20 U/L
ANION GAP SERPL CALC-SCNC: 14 MMOL/L
AST SERPL-CCNC: 23 U/L
BASOPHILS # BLD AUTO: 0.01 K/UL
BASOPHILS NFR BLD AUTO: 0.2 %
BILIRUB SERPL-MCNC: 0.4 MG/DL
BUN SERPL-MCNC: 15 MG/DL
CALCIUM SERPL-MCNC: 9.8 MG/DL
CHLORIDE SERPL-SCNC: 106 MMOL/L
CO2 SERPL-SCNC: 22 MMOL/L
CREAT SERPL-MCNC: 1.19 MG/DL
EOSINOPHIL # BLD AUTO: 0.03 K/UL
EOSINOPHIL NFR BLD AUTO: 0.6 %
FSH SERPL-MCNC: 3.6 IU/L
GLUCOSE SERPL-MCNC: 73 MG/DL
HBA1C MFR BLD HPLC: 5.7 %
HCT VFR BLD CALC: 52.5 %
HGB BLD-MCNC: 17.6 G/DL
IMM GRANULOCYTES NFR BLD AUTO: 0.2 %
LH SERPL-ACNC: 5.8 IU/L
LYMPHOCYTES # BLD AUTO: 1.37 K/UL
LYMPHOCYTES NFR BLD AUTO: 27 %
MAN DIFF?: NORMAL
MCHC RBC-ENTMCNC: 29.1 PG
MCHC RBC-ENTMCNC: 33.5 GM/DL
MCV RBC AUTO: 86.8 FL
MONOCYTES # BLD AUTO: 0.46 K/UL
MONOCYTES NFR BLD AUTO: 9.1 %
NEUTROPHILS # BLD AUTO: 3.2 K/UL
NEUTROPHILS NFR BLD AUTO: 62.9 %
PLATELET # BLD AUTO: 181 K/UL
POTASSIUM SERPL-SCNC: 5.3 MMOL/L
PROT SERPL-MCNC: 6.9 G/DL
RBC # BLD: 6.05 M/UL
RBC # FLD: 15.2 %
SODIUM SERPL-SCNC: 142 MMOL/L
T4 FREE SERPL-MCNC: 1.8 NG/DL
TESTOST SERPL-MCNC: 448.3 NG/DL
TSH SERPL-ACNC: 4.18 UIU/ML
WBC # FLD AUTO: 5.08 K/UL

## 2019-03-05 ENCOUNTER — TRANSCRIPTION ENCOUNTER (OUTPATIENT)
Age: 44
End: 2019-03-05

## 2019-03-14 ENCOUNTER — APPOINTMENT (OUTPATIENT)
Dept: SURGERY | Facility: CLINIC | Age: 44
End: 2019-03-14
Payer: COMMERCIAL

## 2019-03-14 PROCEDURE — 99213 OFFICE O/P EST LOW 20 MIN: CPT

## 2019-03-14 NOTE — ASSESSMENT
[FreeTextEntry1] : will observe.thyroid sonogram and blood tests  per dr early. to return earlier if any change.  asked to be evaluated by ENT for otitis.

## 2019-03-14 NOTE — PROCEDURE
[None] : none [Flexible Endoscope] : examined with the flexible endoscope [Lesion(s)] : no lesions [Normal] : the epiglottis was regular without inflammation, lesions or masses, with regular aryepiglottic folds, and a smooth petiolus

## 2019-03-14 NOTE — PHYSICAL EXAM
[de-identified] : changes from prior surgery [de-identified] : no palpable thyroid nodules [Nasal Endoscopy Performed] : nasal endoscopy was performed, see procedure section for findings [Laryngoscopy Performed] : laryngoscopy was performed, see procedure section for findings [Midline] : located in midline position [Normal] : orientation to person, place, and time: normal [FreeTextEntry2] : no change in trismus

## 2019-03-14 NOTE — HISTORY OF PRESENT ILLNESS
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  denies pain, bleeding, dysphagia, hoarseness or new lesions.  .  recent thyroid sonogram unchanged.  being treated for GERD.  not able to work.  seen in ER several days ago with otitis. treated with amoxicillin

## 2019-04-11 ENCOUNTER — RX RENEWAL (OUTPATIENT)
Age: 44
End: 2019-04-11

## 2019-05-14 ENCOUNTER — RX RENEWAL (OUTPATIENT)
Age: 44
End: 2019-05-14

## 2019-05-23 ENCOUNTER — APPOINTMENT (OUTPATIENT)
Dept: OTHER | Facility: CLINIC | Age: 44
End: 2019-05-23
Payer: COMMERCIAL

## 2019-05-23 VITALS
WEIGHT: 250 LBS | SYSTOLIC BLOOD PRESSURE: 115 MMHG | RESPIRATION RATE: 15 BRPM | DIASTOLIC BLOOD PRESSURE: 74 MMHG | OXYGEN SATURATION: 95 % | HEIGHT: 73 IN | BODY MASS INDEX: 33.13 KG/M2 | HEART RATE: 90 BPM

## 2019-05-23 PROCEDURE — 99215 OFFICE O/P EST HI 40 MIN: CPT

## 2019-05-23 RX ORDER — CLOMIPHENE CITRATE 50 MG/1
50 TABLET ORAL
Qty: 15 | Refills: 3 | Status: COMPLETED | COMMUNITY
Start: 2018-09-26 | End: 2019-05-23

## 2019-05-23 RX ORDER — FAMOTIDINE 40 MG/1
TABLET, FILM COATED ORAL
Refills: 0 | Status: COMPLETED | COMMUNITY
End: 2019-05-23

## 2019-05-23 RX ORDER — MULTIVIT-MIN/FOLIC/VIT K/LYCOP 400-300MCG
TABLET ORAL
Refills: 0 | Status: ACTIVE | COMMUNITY

## 2019-05-23 RX ORDER — AMANTADINE HYDROCHLORIDE 100 1/1
100 TABLET ORAL
Qty: 60 | Refills: 0 | Status: COMPLETED | COMMUNITY
Start: 2018-12-12

## 2019-06-18 ENCOUNTER — OTHER (OUTPATIENT)
Age: 44
End: 2019-06-18

## 2019-07-15 ENCOUNTER — RX RENEWAL (OUTPATIENT)
Age: 44
End: 2019-07-15

## 2019-07-15 ENCOUNTER — APPOINTMENT (OUTPATIENT)
Dept: ENDOCRINOLOGY | Facility: CLINIC | Age: 44
End: 2019-07-15
Payer: COMMERCIAL

## 2019-07-15 VITALS
DIASTOLIC BLOOD PRESSURE: 66 MMHG | HEIGHT: 73 IN | BODY MASS INDEX: 33.4 KG/M2 | HEART RATE: 103 BPM | WEIGHT: 252 LBS | SYSTOLIC BLOOD PRESSURE: 132 MMHG

## 2019-07-15 LAB — HBA1C MFR BLD HPLC: 5.8

## 2019-07-15 PROCEDURE — 99214 OFFICE O/P EST MOD 30 MIN: CPT | Mod: 25

## 2019-07-15 PROCEDURE — 83036 HEMOGLOBIN GLYCOSYLATED A1C: CPT | Mod: QW

## 2019-07-15 NOTE — DATA REVIEWED
[FreeTextEntry1] : Labs 7/15/19:\par A1c 5.8%\par \par Labs 1/2019:\par TSH 4.18\par Free T4 1.8\par A1c 5.7%\par CMP normal\par Vit D 32\par \par Labs 11/28/18:\par Cr 1.35\par CMP normal\par \par HDL 47\par Chol 193\par Trig 114\par \par Thyroid Ultrasound Report 9/18/18:\par \par Technique: multiple real time longitudinal and transverse images were obtained using a high resolution ultrasound with a linear transducer, [employer] e 2008 model, 10-12 MHz frequencies. All measurements will be reported as longitudinal x scott-posterior x transverse. \par Comparison: Report dated 10/2017. \par \par Indication: multinodular goiter. \par \par Findings: \par The right thyroid lobe measures 3.23 x 1.98 x 1.40 cm. The left thyroid lobe measures 3.51 x 1.44 x 1.66 cm. The isthmus measures 0.31 cm. \par The right thyroid lobe has a heterogeneous parenchyma. \par The left thyroid lobe has a heterogeneous parenchyma . \par  \par In the right lower pole there is a  predominantly solid, with isoechoic solid component. It measures 1.06 x 0.83 x 0.94 cm. The nodule is round in shape and the border is smooth. The nodule has a halo. It demonstrates macrocalcifications. It has peripheral and scant internal vascularity. \par \par In the left lower pole there is a  mixed, predominantly solid, with isoechoic solid component. It measures 1.05 x 0.74 x 0.84 cm. The nodule is round in shape and the border is smooth. The nodule has a halo. It demonstrates macrocalcifications. It has peripheral and scant internal vascularity. \par \par In the right mid pole there is a  predominantly solid. It measures 1.11 x 0.70 x 0.81 cm. The nodule is ovoid in shape and the border is smooth. The nodule has a halo. It demonstrates no calcifications. It has peripheral vascularity. \par \par In the right mid pole there is a  mixed. It measures 0.83 x 0.82 x 1.05 cm. The nodule is round in shape and the border is smooth. The nodule has a halo. It has peripheral vascularity. \par \par \par Labs 4/2018:\par TSH 4.31\par Free T4 1.4\par \par Labs 12/2017\par Hb 17.7\par CMP normal\par \par HDL 44\par Chol 241\par Trig 77\par \par Labs 10/2017:\par Cortisol 8.4\par TSH 4.86\par Free T4 1.4\par ACTH 18\par Prolactin 14.7\par FSH <0.1\par LH < 0.1\par IGF-1 150\par Testosterone 64.4\par \par \par Thyroid Ultrasound Report 10/2017\par \par Technique: multiple real time longitudinal and transverse images were obtained using a high resolution ultrasound with a linear transducer, [employer] e 2008 model, 10-12 MHz frequencies. All measurements will be reported as longitudinal x scott-posterior x transverse. \par Comparison: Report dated 10/2016. \par \par Indication: multinodular goiter. \par \par Findings: \par The right thyroid lobe measures 2.53 x 1.38 x 1.34 cm. The left thyroid lobe measures 3.41 x 1.18 x 0.99 cm. The isthmus measures 0.26 cm. \par The right thyroid lobe has a heterogeneous parenchyma. \par The left thyroid lobe has a heterogeneous parenchyma . \par  \par In the right lower pole there is a  predominantly solid, with isoechoic solid component. It measures 1.45 x 0.87 x 0.87 cm. The nodule is round in shape and the border is smooth. The nodule has a halo. It demonstrates macrocalcifications. It has peripheral and scant internal vascularity. \par \par In the left lower pole there is a  mixed, predominantly solid, with isoechoic solid component. It measures 0.66 x 0.75 x 0.85 cm. The nodule is round in shape and the border is smooth. The nodule has a halo. It demonstrates macrocalcifications. It has peripheral and scant internal vascularity. \par Labs 1/11/17:\par \par HDL 40\par Chol 227\par Trig 98\par Creatinine 1.36\par CMP otherwise normal except AST of 51\par TSH 3.70\par \par Pathology Right Lower Pole Thyroid Nodule 10/2016: Benign\par \par Thyroid Ultrasound Report 10/18/16:\par \par Technique: multiple real time longitudinal and transverse images were obtained using a high resolution ultrasound with a linear transducer, [employer] e 2008 model, 10-12 MHz frequencies. All measurements will be reported as longitudinal x scott-posterior x transverse. \par Comparison: Report dated 4/2016. \par \par Indication: multinodular goiter. \par \par Findings: \par The right thyroid lobe measures 2.61 x 1.90 x 1.54 cm. The left thyroid lobe measures 3.1 x 1.3 x 1.26 cm. The isthmus measures 0.29 cm. \par The right thyroid lobe has a heterogeneous parenchyma. \par The left thyroid lobe has a heterogeneous parenchyma . \par  \par In the right lower pole there is a  predominantly solid, with isoechoic solid component. It measures 1.56 x 0.9 x 1.2 cm. The nodule is round in shape and the border is smooth. The nodule has a halo. It demonstrates macrocalcifications. It has peripheral and scant internal vascularity. \par \par In the left lower pole there is a  mixed, predominantly solid, with isoechoic solid component. It measures 1.05 x 0.97 x 1.01 cm. The nodule is round in shape and the border is smooth. The nodule has a halo. It demonstrates macrocalcifications. It has peripheral and scant internal vascularity. \par \par Labs 7/2016:\par TSH 8.45\par Free T4 1.3\par TPO Ab neg.\par \par FNA Right Lower 4/2016: Benign\par \par Labs 3/29/16:\par TSH 7.08\par Free T4 1.4\par Anti-TPO Ab negative\par \par Thyroid Ultrasound Report 3/29/16:\par \par Technique: multiple real time longitudinal and transverse images were obtained using a high resolution ultrasound with a linear transducer, [employer] e 2008 model, 10-12 MHz frequencies. All measurements will be reported as longitudinal x scott-posterior x transverse. \par Comparison: None. \par \par Indication: thyroid nodule. \par \par Findings: \par The right thyroid lobe measures 2.37 x 1.44 x 1.62 cm. The left thyroid lobe measures 2.11 x 1.18 x 1.39 cm. The isthmus measures 0.32 cm. \par The right thyroid lobe has a heterogeneous parenchyma. \par The left thyroid lobe has a heterogeneous parenchyma . \par  \par In the right lower pole there is a  predominantly solid, with isoechoic solid component. It measures 0.87 x 0.89 x 0.94 cm. The nodule is round in shape and the border is smooth. The nodule has a halo. It demonstrates macrocalcifications. It has peripheral and scant internal vascularity. \par \par In the right lower pole there is a  predominantly solid, with isoechoic solid component. It measures 1.05 x 0.97 x 1.01 cm. The nodule is round in shape and the border is smooth. The nodule has a halo. It demonstrates macrocalcifications. It has peripheral and scant internal vascularity. \par

## 2019-07-15 NOTE — REVIEW OF SYSTEMS
[Fatigue] : fatigue [Dysphagia] : dysphagia [Neck Pain] : neck pain [Palpitations] : palpitations [Decreased Libido] : decreased libido [Dry Skin] : dry skin [Anxiety] : anxiety [Cold Intolerance] : cold intolerant [Heat Intolerance] : heat intolerant [All other systems negative] : All other systems negative [Recent Weight Gain (___ Lbs)] : no recent weight gain [Recent Weight Loss (___ Lbs)] : no recent weight loss [Fever] : no fever [Chills] : no chills [Chest Pain] : no chest pain [Heart Rate Is Slow] : the heart rate was not slow [Heart Rate Is Fast] : the heart rate was not fast [Lower Ext Edema] : no lower extremity edema [Shortness Of Breath] : no shortness of breath [Cough] : no cough [Nausea] : no nausea [Vomiting] : no vomiting was observed [Constipation] : no constipation [Diarrhea] : no diarrhea [Abdominal Pain] : no abdominal pain [Polyuria] : no polyuria [Erectile Dysfunction] : no erectile dysfunction [Headache] : no headaches [Tremors] : no tremors [Dizziness] : no dizziness [de-identified] : chronic heat and cold intolerance

## 2019-07-15 NOTE — PHYSICAL EXAM
[Alert] : alert [No Acute Distress] : no acute distress [Well Nourished] : well nourished [Well Developed] : well developed [Normal Sclera/Conjunctiva] : normal sclera/conjunctiva [PERRL] : pupils equal, round and reactive to light [Normal Oropharynx] : the oropharynx was normal [Supple] : the neck was supple [Thyroid Not Enlarged] : the thyroid was not enlarged [No Respiratory Distress] : no respiratory distress [Normal Rate and Effort] : normal respiratory rhythm and effort [No Accessory Muscle Use] : no accessory muscle use [Clear to Auscultation] : lungs were clear to auscultation bilaterally [Normal Rate] : heart rate was normal  [Normal S1, S2] : normal S1 and S2 [Regular Rhythm] : with a regular rhythm [No Edema] : there was no peripheral edema [Not Tender] : non-tender [Soft] : abdomen soft [Not Distended] : not distended [No Masses] : no abdominal mass palpated [No Stigmata of Cushings Syndrome] : no stigmata of cushings syndrome [Normal Gait] : normal gait [No Clubbing, Cyanosis] : no clubbing  or cyanosis of the fingernails [Normal Strength/Tone] : muscle strength and tone were normal [No Motor Deficits] : the motor exam was normal [No Tremors] : no tremors [Oriented x3] : oriented to person, place, and time [Normal Affect] : the affect was normal [Normal Mood] : the mood was normal [Acanthosis Nigricans] : no acanthosis nigricans [de-identified] : surgical scars presents, stiff thyroid gland, scar tissue present along left aspect of face and neck

## 2019-07-15 NOTE — PROCEDURE
[[employer] e 2008 model, 10-12 MHz frequencies] : multiple real time longitudinal and transverse images were obtained using a high resolution ultrasound with a linear transducer, [employer] e 2008 model, 10-12 MHz frequencies. All measurements will be reported as longitudinal x scott-posterior x transverse. [Report dated ___] : Report dated [unfilled] [Multinodular Goiter] : multinodular goiter [] : a heterogeneous parenchyma  [Peripheral and scant  internal vas] : peripheral and scant internal vascularity [Left Thyroid] : left [Lower] : lower pole there is a  [Isoechoic solid component] : with isoechoic solid component [Macrocalcifications] : macrocalcifications [Peripheral and scant  internal vascularity] : peripheral and scant internal vascularity [Predominantly Solid] : predominantly solid [Ovoid] : ovoid in shape [No calcifications] : no calcifications [Right Thyroid] : right [Mid] : mid pole there is a  [Mixed] : mixed [Round] : round in shape [Smooth] : smooth [Yes] : has a halo [Peripheral vascularity] : peripheral vascularity [FreeTextEntry1] : 3.23 x 1.98 x 1.40 [FreeTextEntry5] : 3.51 x 1.44 x 1.66 [FreeTextEntry2] : 0.31 [FreeTextEntry3] : 0.83 x 0.82 x 1.05

## 2019-07-15 NOTE — ASSESSMENT
[FreeTextEntry1] : 42 y/o M w/multinodular goiter on Synthroid of radiation induced hypothyroidism here for follow up.\par \par 1. Multinodular Goiter- Thyroid U/S FNA  performed 10/2016 for increased nodule size- results revealed multinodular goiter. Repeat thyroid US performed 10/2017 and 9/2018 with stable nodules (see results section for full description). Repeat thyroid US 9/2019 to assess for stability in size. \par \par 2. Hypothyroidism- started on levothyroxine. Now on Synthroid 125 mcg. Last TFTs 1/2019 TSH at goal. Will recheck TFTs now with some possible symptoms of hyperthyroidism. C/w Synthroid 125 will adjust as needed. \par \par 3. Hypogonadism- s/p HCG with trial of Clomid. Likely due to hx of anabolic steroid abuse in the past. Check LH, FSH, testosterone now off Clomid.\par \par

## 2019-07-18 LAB
ALBUMIN SERPL ELPH-MCNC: 4.5 G/DL
ALP BLD-CCNC: 90 U/L
ALT SERPL-CCNC: 30 U/L
ANION GAP SERPL CALC-SCNC: 12 MMOL/L
AST SERPL-CCNC: 28 U/L
BASOPHILS # BLD AUTO: 0.03 K/UL
BASOPHILS NFR BLD AUTO: 0.6 %
BILIRUB SERPL-MCNC: 0.4 MG/DL
BUN SERPL-MCNC: 24 MG/DL
CALCIUM SERPL-MCNC: 9.7 MG/DL
CHLORIDE SERPL-SCNC: 103 MMOL/L
CO2 SERPL-SCNC: 26 MMOL/L
CREAT SERPL-MCNC: 1.07 MG/DL
EOSINOPHIL # BLD AUTO: 0.06 K/UL
EOSINOPHIL NFR BLD AUTO: 1.2 %
FSH SERPL-MCNC: 1.5 IU/L
GLUCOSE SERPL-MCNC: 90 MG/DL
HCT VFR BLD CALC: 50.5 %
HGB BLD-MCNC: 16.5 G/DL
IMM GRANULOCYTES NFR BLD AUTO: 0.2 %
LH SERPL-ACNC: 4 IU/L
LYMPHOCYTES # BLD AUTO: 1.37 K/UL
LYMPHOCYTES NFR BLD AUTO: 27.3 %
MAN DIFF?: NORMAL
MCHC RBC-ENTMCNC: 29.3 PG
MCHC RBC-ENTMCNC: 32.7 GM/DL
MCV RBC AUTO: 89.5 FL
MONOCYTES # BLD AUTO: 0.49 K/UL
MONOCYTES NFR BLD AUTO: 9.8 %
NEUTROPHILS # BLD AUTO: 3.05 K/UL
NEUTROPHILS NFR BLD AUTO: 60.9 %
PLATELET # BLD AUTO: 226 K/UL
POTASSIUM SERPL-SCNC: 4.7 MMOL/L
PROT SERPL-MCNC: 6.7 G/DL
RBC # BLD: 5.64 M/UL
RBC # FLD: 13.8 %
SHBG SERPL-SCNC: 16 NMOL/L
SODIUM SERPL-SCNC: 141 MMOL/L
T4 FREE SERPL-MCNC: 1.8 NG/DL
TESTOST SERPL-MCNC: 198 NG/DL
TSH SERPL-ACNC: 3.46 UIU/ML
WBC # FLD AUTO: 5.01 K/UL

## 2019-07-29 ENCOUNTER — INBOUND DOCUMENT (OUTPATIENT)
Age: 44
End: 2019-07-29

## 2019-09-24 ENCOUNTER — APPOINTMENT (OUTPATIENT)
Dept: SURGERY | Facility: CLINIC | Age: 44
End: 2019-09-24

## 2019-10-06 ENCOUNTER — HOSPITAL ENCOUNTER (EMERGENCY)
Dept: HOSPITAL 74 - JER | Age: 44
LOS: 1 days | Discharge: HOME | End: 2019-10-07
Payer: COMMERCIAL

## 2019-10-06 VITALS — BODY MASS INDEX: 33 KG/M2

## 2019-10-06 DIAGNOSIS — R07.9: Primary | ICD-10-CM

## 2019-10-06 DIAGNOSIS — K21.9: ICD-10-CM

## 2019-10-06 DIAGNOSIS — Z85.89: ICD-10-CM

## 2019-10-06 DIAGNOSIS — I10: ICD-10-CM

## 2019-10-06 PROCEDURE — 3E033GC INTRODUCTION OF OTHER THERAPEUTIC SUBSTANCE INTO PERIPHERAL VEIN, PERCUTANEOUS APPROACH: ICD-10-PCS

## 2019-10-06 PROCEDURE — 3E033NZ INTRODUCTION OF ANALGESICS, HYPNOTICS, SEDATIVES INTO PERIPHERAL VEIN, PERCUTANEOUS APPROACH: ICD-10-PCS

## 2019-10-07 VITALS — SYSTOLIC BLOOD PRESSURE: 127 MMHG | DIASTOLIC BLOOD PRESSURE: 84 MMHG | HEART RATE: 88 BPM | TEMPERATURE: 98.3 F

## 2019-10-07 LAB
ALBUMIN SERPL-MCNC: 3.9 G/DL (ref 3.4–5)
ALP SERPL-CCNC: 104 U/L (ref 45–117)
ALT SERPL-CCNC: 62 U/L (ref 13–61)
ANION GAP SERPL CALC-SCNC: 8 MMOL/L (ref 8–16)
AST SERPL-CCNC: 46 U/L (ref 15–37)
BASOPHILS # BLD: 0.3 % (ref 0–2)
BILIRUB SERPL-MCNC: 0.5 MG/DL (ref 0.2–1)
BUN SERPL-MCNC: 17.7 MG/DL (ref 7–18)
CALCIUM SERPL-MCNC: 9.1 MG/DL (ref 8.5–10.1)
CHLORIDE SERPL-SCNC: 102 MMOL/L (ref 98–107)
CO2 SERPL-SCNC: 30 MMOL/L (ref 21–32)
CREAT SERPL-MCNC: 1.1 MG/DL (ref 0.55–1.3)
DEPRECATED RDW RBC AUTO: 13.9 % (ref 11.9–15.9)
EOSINOPHIL # BLD: 0.2 % (ref 0–4.5)
GLUCOSE SERPL-MCNC: 102 MG/DL (ref 74–106)
HCT VFR BLD CALC: 48.1 % (ref 35.4–49)
HGB BLD-MCNC: 16.3 GM/DL (ref 11.7–16.9)
LYMPHOCYTES # BLD: 10.7 % (ref 8–40)
MCH RBC QN AUTO: 29.5 PG (ref 25.7–33.7)
MCHC RBC AUTO-ENTMCNC: 34 G/DL (ref 32–35.9)
MCV RBC: 86.9 FL (ref 80–96)
MONOCYTES # BLD AUTO: 6 % (ref 3.8–10.2)
NEUTROPHILS # BLD: 82.8 % (ref 42.8–82.8)
PLATELET # BLD AUTO: 199 K/MM3 (ref 134–434)
PMV BLD: 8.2 FL (ref 7.5–11.1)
POTASSIUM SERPLBLD-SCNC: 4.3 MMOL/L (ref 3.5–5.1)
PROT SERPL-MCNC: 6.8 G/DL (ref 6.4–8.2)
RBC # BLD AUTO: 5.53 M/MM3 (ref 4–5.6)
SODIUM SERPL-SCNC: 140 MMOL/L (ref 136–145)
WBC # BLD AUTO: 8.5 K/MM3 (ref 4–10)

## 2019-10-07 NOTE — PDOC
*Physical Exam





- Vital Signs


 Last Vital Signs











Temp Pulse Resp BP Pulse Ox


 


 98.4 F   100 H  20   145/90   97 


 


 10/06/19 22:20  10/07/19 01:51  10/06/19 22:20  10/07/19 02:22  10/06/19 22:20














ED Treatment Course





- LABORATORY


CBC & Chemistry Diagram: 


 10/07/19 01:30





 10/07/19 01:30





- ADDITIONAL ORDERS


Additional order review: 


 Laboratory  Results











  10/07/19 10/07/19 10/07/19





  01:30 01:30 01:30


 


Sodium    140


 


Potassium    4.3


 


Chloride    102


 


Carbon Dioxide    30


 


Anion Gap    8


 


BUN    17.7


 


Creatinine    1.1


 


Est GFR (CKD-EPI)AfAm    94.79


 


Est GFR (CKD-EPI)NonAf    81.78


 


Random Glucose    102


 


Lactic Acid   1.0 


 


Calcium    9.1


 


Total Bilirubin    0.5


 


AST    46 H


 


ALT    62 H


 


Alkaline Phosphatase    104


 


Creatine Kinase   


 


Troponin I   


 


Total Protein    6.8


 


Albumin    3.9


 


Lipase  151  














  10/07/19





  01:30


 


Sodium 


 


Potassium 


 


Chloride 


 


Carbon Dioxide 


 


Anion Gap 


 


BUN 


 


Creatinine 


 


Est GFR (CKD-EPI)AfAm 


 


Est GFR (CKD-EPI)NonAf 


 


Random Glucose 


 


Lactic Acid 


 


Calcium 


 


Total Bilirubin 


 


AST 


 


ALT 


 


Alkaline Phosphatase 


 


Creatine Kinase  122


 


Troponin I  < 0.02


 


Total Protein 


 


Albumin 


 


Lipase 








 











  10/07/19





  01:30


 


RBC  5.53


 


MCV  86.9


 


MCHC  34.0


 


RDW  13.9


 


MPV  8.2


 


Neutrophils %  82.8


 


Lymphocytes %  10.7


 


Monocytes %  6.0


 


Eosinophils %  0.2


 


Basophils %  0.3














- Medications


Given in the ED: 


ED Medications














Discontinued Medications














Generic Name Dose Route Start Last Admin





  Trade Name Jorge Luis  PRN Reason Stop Dose Admin


 


Acetaminophen  1,000 mg  10/07/19 02:22  10/07/19 02:28





  Ofirmev Injection -  IVPB  10/07/19 02:23  1,000 mg





  ONCE ONE   Administration





     





     





     





     


 


Al Hydroxide/Mg Hydroxide  30 ml  10/07/19 02:22  10/07/19 02:28





  Mylanta Oral Suspension -  PO  10/07/19 02:23  30 ml





  ONCE ONE   Administration





     





     





     





     


 


Famotidine/Sodium Chloride  20 mg in 50 mls @ 100 mls/hr  10/07/19 00:10  10/07/

19 01:36





  Pepcid 20 Mg Premixed Ivpb -  IVPB  10/07/19 00:39  100 mls/hr





  ONCE ONE   Administration





     





     





     





     


 


Lactated Ringer's  1,000 ml  10/07/19 00:10  10/07/19 01:36





  Lactated Ringers Solution  IV  10/07/19 00:11  1,000 ml





  ONCE ONE   Administration





     





     





     





     


 


Losartan Potassium  25 mg  10/07/19 02:14  10/07/19 02:28





  Cozaar -  PO  10/07/19 02:15  Not Given





  ONCE ONE   





     





     





     





     


 


Ondansetron HCl  4 mg  10/07/19 00:10  10/07/19 01:36





  Zofran Injection  IVPUSH  10/07/19 00:11  4 mg





  ONCE ONE   Administration





     





     





     





     














Medical Decision Making





- Medical Decision Making





10/07/19 02:40


Sign out received from Dr Schmidt.


45yo M hx HTN, GERD, cancer s/p radiation presents with chest and epigastric 

pain today.


Given zofran and pepcid. Pt feeling a little better. Pending maalox and tylenol 

IV. IVF running.


Labs reviewed. Trop and lipase negative. Still tachycardic.





Discharge home pending reassessment of pain and VS s/p medication and hydration.


Pt seen and assessed at bedside.





10/07/19 03:23


VS normalized. Pt feeling better s/p meds.


Will dc home with PCP f/u. Return precautions given. Pt understands all dc 

instructions and all questions were answered.





Discharge





- Discharge Information


Problems reviewed: Yes


Clinical Impression/Diagnosis: 


 Chest pain





Condition: Improved


Disposition: HOME





- Admission


No





- Follow up/Referral


Referrals: 


Valdo Zhang MD [Primary Care Provider] - 





- Patient Discharge Instructions


Patient Printed Discharge Instructions:  DI for Gastroesophageal Reflux Disease 

(GERD), DI for Atypical Chest Pain


Additional Instructions: 


You have been seen in the Emergency Department for chest pain. Your EKG, chest X

-ray, and labs, including Troponin (a heart enzyme), show no signs concerning 

for an emergent condition such as a heart attack or pneumonia. The cause of 

your pain is uncertain at this time but it is most likely GERD (reflux). Follow-

up with your primary care doctor within 1 week for further evaluation and 

management. 





Return to the ED immediately if you experience chest pain, difficulty breathing

, dizziness, or any other new or worsening symptom.





- Post Discharge Activity

## 2019-10-07 NOTE — EKG
Test Reason : 

Blood Pressure : ***/*** mmHG

Vent. Rate : 105 BPM     Atrial Rate : 105 BPM

   P-R Int : 152 ms          QRS Dur : 100 ms

    QT Int : 338 ms       P-R-T Axes : 063 -80 041 degrees

   QTc Int : 446 ms

 

SINUS TACHYCARDIA

INCOMPLETE RIGHT BUNDLE BRANCH BLOCK

LEFT ANTERIOR FASCICULAR BLOCK

ABNORMAL ECG

WHEN COMPARED WITH ECG OF 17-SEP-2010 14:58,

INCOMPLETE RIGHT BUNDLE BRANCH BLOCK IS NOW PRESENT

Confirmed by QUE SANTIAGO, JD ([id number]) on 10/7/2019 10:16:08 AM

 

Referred By:             Confirmed By:JD GREY MD

## 2019-10-07 NOTE — PDOC
History of Present Illness





- General


Chief Complaint: Pain


Stated Complaint: HYPERTENSION


Time Seen by Provider: 10/07/19 00:00


History Source: Patient, Family





- History of Present Illness


Initial Comments: 





10/07/19 01:53


Mr. Yeung is a 45 y/o M with hx GERD, squamous cell carcinoma (in remission, 

prior spread up neck, resection/chemo), HTN presenting with three hours of 

ongoing chest and epigastric pain. He reports that the pain began at approx 8pm

, two hours after eating dinner. He reports that 





Past History





- Past Medical History


Allergies/Adverse Reactions: 


 Allergies











Allergy/AdvReac Type Severity Reaction Status Date / Time


 


No Known Allergies Allergy   Verified 10/06/19 22:17











Cancer: Yes (squamous cell)


COPD: No





- Surgical History


Appendectomy: Yes





- Psycho Social/Smoking Cessation Hx


Smoking History: Never smoked





*Physical Exam





- Vital Signs


 Last Vital Signs











Temp Pulse Resp BP Pulse Ox


 


 98.4 F   113 H  20   155/108 H  97 


 


 10/06/19 22:20  10/06/19 22:20  10/06/19 22:20  10/06/19 22:20  10/06/19 22:20














ED Treatment Course





- LABORATORY


CBC & Chemistry Diagram: 


 10/07/19 01:30





 10/07/19 01:30





Discharge





- Follow up/Referral


Referrals: 


Valdo Zhang MD [Primary Care Provider] - 





- Patient Discharge Instructions





- Post Discharge Activity

## 2019-10-07 NOTE — PDOC
Attending Attestation





- Resident


Resident Name: Sterling Schmidt





- ED Attending Attestation


I have performed the following: I have examined & evaluated the patient, The 

case was reviewed & discussed with the resident, I agree w/resident's findings 

& plan, Exceptions are as noted





- HPI


HPI: 





10/07/19 00:29


45 yo male p/w tachycardia  and hypertension with epigastric /chest pain  2 

hours after eating 





- Physicial Exam


PE: 





10/07/19 01:53


well muscled 45 yo male p/w  band like epigastric /xiphoid area


head  ncat


neck supple, s/p left sided neck resection in the past


Oral membranes dry


lungs cta b/l


cvs tachycardia


abd no rebound,no guarding


extremities  from,no tenderness


neuro axox3,ambulatory











- Medical Decision Making





10/07/19 01:59


plan  labs,pepcid IV,cardiac enzymes ,cbc,comp and reassess


10/07/19 02:01


ekg  sinus tachycardia @105 bpm, inc RBBB


10/07/19 02:23


pt is still hypertensive and tachycardic , his fluids are still running,will 

repeat trop 


10/07/19 02:24


signed out to Dr Lanza

## 2019-11-06 ENCOUNTER — FORM ENCOUNTER (OUTPATIENT)
Age: 44
End: 2019-11-06

## 2019-11-07 ENCOUNTER — APPOINTMENT (OUTPATIENT)
Dept: OTHER | Facility: CLINIC | Age: 44
End: 2019-11-07
Payer: COMMERCIAL

## 2019-11-07 ENCOUNTER — OTHER (OUTPATIENT)
Age: 44
End: 2019-11-07

## 2019-11-07 ENCOUNTER — APPOINTMENT (OUTPATIENT)
Dept: SURGERY | Facility: CLINIC | Age: 44
End: 2019-11-07
Payer: COMMERCIAL

## 2019-11-07 VITALS
WEIGHT: 255 LBS | OXYGEN SATURATION: 95 % | SYSTOLIC BLOOD PRESSURE: 110 MMHG | HEIGHT: 73 IN | DIASTOLIC BLOOD PRESSURE: 80 MMHG | BODY MASS INDEX: 33.8 KG/M2 | HEART RATE: 103 BPM | RESPIRATION RATE: 16 BRPM

## 2019-11-07 DIAGNOSIS — Z78.9 OTHER SPECIFIED HEALTH STATUS: ICD-10-CM

## 2019-11-07 PROCEDURE — 99213 OFFICE O/P EST LOW 20 MIN: CPT

## 2019-11-07 PROCEDURE — 94010 BREATHING CAPACITY TEST: CPT

## 2019-11-07 PROCEDURE — 99396 PREV VISIT EST AGE 40-64: CPT | Mod: 25

## 2019-11-07 PROCEDURE — 99214 OFFICE O/P EST MOD 30 MIN: CPT | Mod: 25

## 2019-11-07 RX ORDER — OMEGA-3-ACID ETHYL ESTERS CAPSULES 1 G/1
1 CAPSULE, LIQUID FILLED ORAL
Qty: 60 | Refills: 0 | Status: COMPLETED | COMMUNITY
Start: 2017-03-22 | End: 2019-11-07

## 2019-11-07 NOTE — HISTORY OF PRESENT ILLNESS
[de-identified] : 8  years s/p resection left buccal cancer with neck dissection and postop RT.  denies pain, bleeding, dysphagia, hoarseness or new lesions.  .  recent thyroid sonogram unchanged.  being treated for GERD.  not able to work.  seen in ER  for GERD.  notes dysphagia related to recent Botox injections, slowly improving.

## 2019-11-07 NOTE — ASSESSMENT
[FreeTextEntry1] : will observe.thyroid sonogram and blood tests  per dr early. to return earlier if any change.

## 2019-11-07 NOTE — PHYSICAL EXAM
[de-identified] : changes from prior surgery [de-identified] : no palpable thyroid nodules [Nasal Endoscopy Performed] : nasal endoscopy was performed, see procedure section for findings [Laryngoscopy Performed] : laryngoscopy was performed, see procedure section for findings [Midline] : located in midline position [Normal] : orientation to person, place, and time: normal [FreeTextEntry2] : no change in trismus

## 2019-11-08 LAB
ALBUMIN SERPL ELPH-MCNC: 4.5 G/DL
ALP BLD-CCNC: 97 U/L
ALT SERPL-CCNC: 104 U/L
ANION GAP SERPL CALC-SCNC: 14 MMOL/L
APPEARANCE: CLEAR
AST SERPL-CCNC: 49 U/L
BACTERIA: NEGATIVE
BASOPHILS # BLD AUTO: 0.02 K/UL
BASOPHILS NFR BLD AUTO: 0.4 %
BILIRUB SERPL-MCNC: 0.3 MG/DL
BILIRUBIN URINE: NEGATIVE
BLOOD URINE: NEGATIVE
BUN SERPL-MCNC: 13 MG/DL
CALCIUM SERPL-MCNC: 9.6 MG/DL
CHLORIDE SERPL-SCNC: 102 MMOL/L
CHOLEST SERPL-MCNC: 209 MG/DL
CHOLEST/HDLC SERPL: 4.3 RATIO
CO2 SERPL-SCNC: 24 MMOL/L
COLOR: NORMAL
CREAT SERPL-MCNC: 1.06 MG/DL
EOSINOPHIL # BLD AUTO: 0.06 K/UL
EOSINOPHIL NFR BLD AUTO: 1.1 %
GLUCOSE QUALITATIVE U: NEGATIVE
GLUCOSE SERPL-MCNC: 68 MG/DL
HCT VFR BLD CALC: 49.4 %
HDLC SERPL-MCNC: 49 MG/DL
HGB BLD-MCNC: 16.3 G/DL
HYALINE CASTS: 0 /LPF
IMM GRANULOCYTES NFR BLD AUTO: 0.6 %
KETONES URINE: NEGATIVE
LDLC SERPL CALC-MCNC: 127 MG/DL
LEUKOCYTE ESTERASE URINE: NEGATIVE
LYMPHOCYTES # BLD AUTO: 1.53 K/UL
LYMPHOCYTES NFR BLD AUTO: 29 %
MAN DIFF?: NORMAL
MCHC RBC-ENTMCNC: 28.8 PG
MCHC RBC-ENTMCNC: 33 GM/DL
MCV RBC AUTO: 87.3 FL
MICROSCOPIC-UA: NORMAL
MONOCYTES # BLD AUTO: 0.48 K/UL
MONOCYTES NFR BLD AUTO: 9.1 %
NEUTROPHILS # BLD AUTO: 3.15 K/UL
NEUTROPHILS NFR BLD AUTO: 59.8 %
NITRITE URINE: NEGATIVE
PH URINE: 6.5
PLATELET # BLD AUTO: 217 K/UL
POTASSIUM SERPL-SCNC: 4.6 MMOL/L
PROT SERPL-MCNC: 6.8 G/DL
PROTEIN URINE: NEGATIVE
RBC # BLD: 5.66 M/UL
RBC # FLD: 13.7 %
RED BLOOD CELLS URINE: 1 /HPF
SODIUM SERPL-SCNC: 140 MMOL/L
SPECIFIC GRAVITY URINE: 1.01
SQUAMOUS EPITHELIAL CELLS: 0 /HPF
TRIGL SERPL-MCNC: 166 MG/DL
UROBILINOGEN URINE: NORMAL
WBC # FLD AUTO: 5.27 K/UL
WHITE BLOOD CELLS URINE: 0 /HPF

## 2019-11-18 ENCOUNTER — APPOINTMENT (OUTPATIENT)
Dept: ENDOCRINOLOGY | Facility: CLINIC | Age: 44
End: 2019-11-18
Payer: COMMERCIAL

## 2019-11-18 VITALS
HEIGHT: 73 IN | RESPIRATION RATE: 16 BRPM | SYSTOLIC BLOOD PRESSURE: 141 MMHG | OXYGEN SATURATION: 94 % | TEMPERATURE: 98.1 F | WEIGHT: 257.5 LBS | DIASTOLIC BLOOD PRESSURE: 95 MMHG | BODY MASS INDEX: 34.13 KG/M2 | HEART RATE: 122 BPM

## 2019-11-18 PROCEDURE — 99214 OFFICE O/P EST MOD 30 MIN: CPT | Mod: 25

## 2019-11-18 PROCEDURE — 76536 US EXAM OF HEAD AND NECK: CPT

## 2019-11-18 NOTE — ASSESSMENT
[FreeTextEntry1] : 45 y/o M w/multinodular goiter on Synthroid of radiation induced hypothyroidism here for follow up.\par \par 1. Multinodular Goiter- Thyroid U/S FNA  performed 10/2016 for increased nodule size- results revealed multinodular goiter. Repeat thyroid US performed 10/2017 and 9/2018 with stable nodules (see results section for full description). Repeat thyroid US today stable (see results section for full description). Repeat in 12-24 months to assess for stability in size. \par \par 2. Hypothyroidism- started on levothyroxine. Now on Synthroid 125 mcg. Last TFTs 1/2019 TSH at goal. Will recheck TFTs now with some possible symptoms of hyperthyroidism. C/w Synthroid 125 will adjust as needed. \par \par 3. Hypogonadism- s/p HCG with trial of Clomid. Likely due to hx of anabolic steroid abuse in the past. Check LH, FSH, testosterone now off Clomid.\par \par

## 2019-11-18 NOTE — IMPRESSION
[FreeTextEntry2] : Check thyroid function tests today \par Repeat Thyroid US in 12 months [FreeTextEntry1] : Heterogenous gland, nonenlarged with solid thyroid nodules as above.

## 2019-11-18 NOTE — PROCEDURE
[Sociact e 2008 model, 10-12 MHz frequencies] : multiple real time longitudinal and transverse images were obtained using a high resolution ultrasound with a linear transducer, Sociact e 2008 model, 10-12 MHz frequencies. All measurements will be reported as longitudinal x scott-posterior x transverse. [Report dated ___] : Report dated [unfilled] [Multinodular Goiter] : multinodular goiter [] : a heterogeneous parenchyma [Left Thyroid] : left [Lower] : lower pole there is a  [Isoechoic solid component] : with isoechoic solid component [Macrocalcifications] : macrocalcifications [Peripheral and scant  internal vascularity] : peripheral and scant internal vascularity [Predominantly Solid] : predominantly solid [Ovoid] : ovoid in shape [No calcifications] : no calcifications [Mixed] : mixed [Right Thyroid] : right [Mid] : mid pole there is a  [Smooth] : smooth [Round] : round in shape [Yes] : has a halo [Peripheral vascularity] : peripheral vascularity [No vascularity] : no vascularity [FreeTextEntry1] : 3.94 x 1.34 x 1.64 [FreeTextEntry5] : 3.51 x 1.44 x 1.66 [FreeTextEntry2] : 0.26 [FreeTextEntry3] : 0.74 x 0.58 x 0.74

## 2019-11-18 NOTE — REVIEW OF SYSTEMS
[Fatigue] : fatigue [Dysphagia] : dysphagia [Neck Pain] : neck pain [Palpitations] : palpitations [Decreased Libido] : decreased libido [Dry Skin] : dry skin [Anxiety] : anxiety [Cold Intolerance] : cold intolerant [Heat Intolerance] : heat intolerant [All other systems negative] : All other systems negative [Recent Weight Loss (___ Lbs)] : no recent weight loss [Recent Weight Gain (___ Lbs)] : no recent weight gain [Fever] : no fever [Chills] : no chills [Heart Rate Is Slow] : the heart rate was not slow [Chest Pain] : no chest pain [Heart Rate Is Fast] : the heart rate was not fast [Lower Ext Edema] : no lower extremity edema [Shortness Of Breath] : no shortness of breath [Cough] : no cough [Nausea] : no nausea [Vomiting] : no vomiting was observed [Constipation] : no constipation [Diarrhea] : no diarrhea [Abdominal Pain] : no abdominal pain [Polyuria] : no polyuria [Erectile Dysfunction] : no erectile dysfunction [Headache] : no headaches [Dizziness] : no dizziness [Tremors] : no tremors [de-identified] : chronic heat and cold intolerance

## 2019-11-18 NOTE — PHYSICAL EXAM
[Alert] : alert [No Acute Distress] : no acute distress [Well Nourished] : well nourished [Well Developed] : well developed [Normal Sclera/Conjunctiva] : normal sclera/conjunctiva [PERRL] : pupils equal, round and reactive to light [Supple] : the neck was supple [Normal Oropharynx] : the oropharynx was normal [Thyroid Not Enlarged] : the thyroid was not enlarged [No Respiratory Distress] : no respiratory distress [No Accessory Muscle Use] : no accessory muscle use [Normal Rate and Effort] : normal respiratory rhythm and effort [Clear to Auscultation] : lungs were clear to auscultation bilaterally [Normal Rate] : heart rate was normal  [Normal S1, S2] : normal S1 and S2 [No Edema] : there was no peripheral edema [Regular Rhythm] : with a regular rhythm [Not Tender] : non-tender [Soft] : abdomen soft [Not Distended] : not distended [No Masses] : no abdominal mass palpated [No Stigmata of Cushings Syndrome] : no stigmata of cushings syndrome [Normal Gait] : normal gait [No Clubbing, Cyanosis] : no clubbing  or cyanosis of the fingernails [Normal Strength/Tone] : muscle strength and tone were normal [No Motor Deficits] : the motor exam was normal [No Tremors] : no tremors [Oriented x3] : oriented to person, place, and time [Normal Affect] : the affect was normal [Normal Mood] : the mood was normal [Acanthosis Nigricans] : no acanthosis nigricans [de-identified] : surgical scars presents, stiff thyroid gland, scar tissue present along left aspect of face and neck

## 2019-11-18 NOTE — PHYSICAL EXAM
[Alert] : alert [No Acute Distress] : no acute distress [Well Nourished] : well nourished [Well Developed] : well developed [Normal Sclera/Conjunctiva] : normal sclera/conjunctiva [PERRL] : pupils equal, round and reactive to light [Supple] : the neck was supple [Normal Oropharynx] : the oropharynx was normal [Thyroid Not Enlarged] : the thyroid was not enlarged [No Respiratory Distress] : no respiratory distress [No Accessory Muscle Use] : no accessory muscle use [Normal Rate and Effort] : normal respiratory rhythm and effort [Normal Rate] : heart rate was normal  [Clear to Auscultation] : lungs were clear to auscultation bilaterally [Normal S1, S2] : normal S1 and S2 [Regular Rhythm] : with a regular rhythm [No Edema] : there was no peripheral edema [Not Tender] : non-tender [Soft] : abdomen soft [Not Distended] : not distended [No Masses] : no abdominal mass palpated [Normal Gait] : normal gait [No Stigmata of Cushings Syndrome] : no stigmata of cushings syndrome [No Clubbing, Cyanosis] : no clubbing  or cyanosis of the fingernails [Normal Strength/Tone] : muscle strength and tone were normal [No Motor Deficits] : the motor exam was normal [No Tremors] : no tremors [Oriented x3] : oriented to person, place, and time [Normal Affect] : the affect was normal [Normal Mood] : the mood was normal [Acanthosis Nigricans] : no acanthosis nigricans [de-identified] : surgical scars presents, stiff thyroid gland, scar tissue present along left aspect of face and neck

## 2019-11-18 NOTE — HISTORY OF PRESENT ILLNESS
[FreeTextEntry1] : 45 y/o M diagnosed with thyroid nodules in 2013 which were found incidentally on MRI which was performed for facial pain s/p surgery for squamous cell cancer, also with possible facial nerve damage as he has been experiencing frequent facial spasms unrelieved by Botox. Seen initially by me 3/29/16 with thyroid U/S confirming 2 lower pole right thyroid nodules with microcalcifications largest 1.1cm. s/p FNA 10/2016 with benign findings. Last thyroid US 9/2018. Chemically with subclinical hypothyroidism at that time possibly secondary to radiation exposure from neck cancer so was started on levothyroxine 50 mcg daily. Dose increased to 88 mcg 7/2016. Dose again increased to 100 mcg after TSH found to be mildly elevated at 5.3 (10/2016). Repeat TFTs 1/2017 improved with TSH of 3.7.  TSH 4.86 dose increased to 112 mcg (10/2017) further increased to 125 mcg for TSH of 4.3 (4/2018). Repeat TSH 4.18 (1/2019) remained chemically euthyroid 7/2019 with TSH 3.46. Takes Synthroid daily in am on empty stomach, eats breakfast 1 hour later. Denies missing doses. Denies current fevers, chills, night sweats, chest pain, SOB, abdominal pain, nausea, vomiting, diarrhea, constipation. Reports chronic dry skin on hands, but no recent changes. Reports heat and cold intolerance - unable to tolerate extremes in weather. Reports chronic dysphagia and chronic neck pain. +occasional palpitations especially at night. Had been exposed to [facility] site. Has a history of radiation to the neck for treatment for squamous cell cancer - last RT August 2016. +weight stable.\par \par Of note, patient states that he follows with a urologist for history of nephrolithiasis; had his testosterone levels check for complaint of low energy and found to have low level. Underwent MRI head for further evaluation, was told that his pituitary was affected by prior RT, but also has a hx of anabolic steroid use. Was started on HCG injections weekly at the end of March 2017. Was on testosterone injection 200 mg every 2 weeks. Not looking to have children at this time, but wife may want more children. Has noticed increased mood swings. Recommended trial of Clomid 9/2018 with testosterone level of 230.9. Was taking 25 mg every other day. Had not noticed change in libido. Has been off Clomid since 1/2019 as insurance would not cover it. Also with persistent fatigue and low energy. Treated for sleep apnea with CPAP at night.

## 2019-11-18 NOTE — ASSESSMENT
[FreeTextEntry1] : 43 y/o M w/multinodular goiter on Synthroid of radiation induced hypothyroidism here for follow up.\par \par 1. Multinodular Goiter- Thyroid U/S FNA  performed 10/2016 for increased nodule size- results revealed multinodular goiter. Repeat thyroid US performed 10/2017 and 9/2018 with stable nodules (see results section for full description). Repeat thyroid US today stable (see results section for full description). Repeat in 12-24 months to assess for stability in size. \par \par 2. Hypothyroidism- started on levothyroxine. Now on Synthroid 125 mcg. Last TFTs 1/2019 TSH at goal. Will recheck TFTs now with some possible symptoms of hyperthyroidism. C/w Synthroid 125 will adjust as needed. \par \par 3. Hypogonadism- s/p HCG with trial of Clomid. Likely due to hx of anabolic steroid abuse in the past. Check LH, FSH, testosterone now off Clomid.\par \par

## 2019-11-18 NOTE — DATA REVIEWED
[FreeTextEntry1] : Thyroid Ultrasound Report 11/18/19:\par \par Technique: multiple real time longitudinal and transverse images were obtained using a high resolution ultrasound with a linear transducer, [employer] e 2008 model, 10-12 MHz frequencies. All measurements will be reported as longitudinal x scott-posterior x transverse. \par Comparison: Report dated 9/2018. \par \par Indication: multinodular goiter. \par \par Findings: \par The right thyroid lobe measures 3.94 x 1.34 x 1.64 cm. The left thyroid lobe measures 3.51 x 1.44 x 1.66 cm. The isthmus measures 0.26 cm. \par The right thyroid lobe has a heterogeneous parenchyma. \par  \par In the right lower pole there is a  predominantly solid, with isoechoic solid component. It measures 0.92 x 0.95 x 0.92 cm. The nodule is round in shape and the border is smooth. The nodule has a halo. It demonstrates macrocalcifications. It has no vascularity. \par \par In the left lower pole there is a  mixed, predominantly solid, with isoechoic solid component. It measures 0.72 x 0.78 x 0.51 cm. The nodule is round in shape and the border is smooth. The nodule has a halo. It demonstrates macrocalcifications. It has peripheral and scant internal vascularity. \par \par In the right mid pole there is a  predominantly solid. It measures 0.91 x 0.76 x 0.89 cm. The nodule is ovoid in shape and the border is smooth. The nodule has a halo. It demonstrates no calcifications. It has no vascularity. \par \par In the right mid pole there is a  mixed. It measures 0.74 x 0.58 x 0.74 cm. The nodule is round in shape and the border is smooth. The nodule has a halo. It has peripheral vascularity. \par \par Labs 11/2019:\par CBC normal\par CMP normal except glucose of 68 and mildly elevated AST ALT\par \par HDL 49\par Chol 209\par Trig 166\par \par Labs 7/15/19:\par A1c 5.8%\par CMP normal\par TSH 3.46\par Free T4 1.8\par Testosterone 198\par LH 4.0\par FSH 1.5\par \par Labs 1/2019:\par TSH 4.18\par Free T4 1.8\par A1c 5.7%\par CMP normal\par Vit D 32\par \par Labs 11/28/18:\par Cr 1.35\par CMP normal\par \par HDL 47\par Chol 193\par Trig 114\par \par Thyroid Ultrasound Report 9/18/18:\par \par Technique: multiple real time longitudinal and transverse images were obtained using a high resolution ultrasound with a linear transducer, [employer] e 2008 model, 10-12 MHz frequencies. All measurements will be reported as longitudinal x scott-posterior x transverse. \par Comparison: Report dated 10/2017. \par \par Indication: multinodular goiter. \par \par Findings: \par The right thyroid lobe measures 3.23 x 1.98 x 1.40 cm. The left thyroid lobe measures 3.51 x 1.44 x 1.66 cm. The isthmus measures 0.31 cm. \par The right thyroid lobe has a heterogeneous parenchyma. \par The left thyroid lobe has a heterogeneous parenchyma . \par  \par In the right lower pole there is a  predominantly solid, with isoechoic solid component. It measures 1.06 x 0.83 x 0.94 cm. The nodule is round in shape and the border is smooth. The nodule has a halo. It demonstrates macrocalcifications. It has peripheral and scant internal vascularity. \par \par In the left lower pole there is a  mixed, predominantly solid, with isoechoic solid component. It measures 1.05 x 0.74 x 0.84 cm. The nodule is round in shape and the border is smooth. The nodule has a halo. It demonstrates macrocalcifications. It has peripheral and scant internal vascularity. \par \par In the right mid pole there is a  predominantly solid. It measures 1.11 x 0.70 x 0.81 cm. The nodule is ovoid in shape and the border is smooth. The nodule has a halo. It demonstrates no calcifications. It has peripheral vascularity. \par \par In the right mid pole there is a  mixed. It measures 0.83 x 0.82 x 1.05 cm. The nodule is round in shape and the border is smooth. The nodule has a halo. It has peripheral vascularity. \par \par \par Labs 4/2018:\par TSH 4.31\par Free T4 1.4\par \par Labs 12/2017\par Hb 17.7\par CMP normal\par \par HDL 44\par Chol 241\par Trig 77\par \par Labs 10/2017:\par Cortisol 8.4\par TSH 4.86\par Free T4 1.4\par ACTH 18\par Prolactin 14.7\par FSH <0.1\par LH < 0.1\par IGF-1 150\par Testosterone 64.4\par \par \par Thyroid Ultrasound Report 10/2017\par \par Technique: multiple real time longitudinal and transverse images were obtained using a high resolution ultrasound with a linear transducer, [employer] e 2008 model, 10-12 MHz frequencies. All measurements will be reported as longitudinal x scott-posterior x transverse. \par Comparison: Report dated 10/2016. \par \par Indication: multinodular goiter. \par \par Findings: \par The right thyroid lobe measures 2.53 x 1.38 x 1.34 cm. The left thyroid lobe measures 3.41 x 1.18 x 0.99 cm. The isthmus measures 0.26 cm. \par The right thyroid lobe has a heterogeneous parenchyma. \par The left thyroid lobe has a heterogeneous parenchyma . \par  \par In the right lower pole there is a  predominantly solid, with isoechoic solid component. It measures 1.45 x 0.87 x 0.87 cm. The nodule is round in shape and the border is smooth. The nodule has a halo. It demonstrates macrocalcifications. It has peripheral and scant internal vascularity. \par \par In the left lower pole there is a  mixed, predominantly solid, with isoechoic solid component. It measures 0.66 x 0.75 x 0.85 cm. The nodule is round in shape and the border is smooth. The nodule has a halo. It demonstrates macrocalcifications. It has peripheral and scant internal vascularity. \par Labs 1/11/17:\par \par HDL 40\par Chol 227\par Trig 98\par Creatinine 1.36\par CMP otherwise normal except AST of 51\par TSH 3.70\par \par Pathology Right Lower Pole Thyroid Nodule 10/2016: Benign\par \par Thyroid Ultrasound Report 10/18/16:\par \par Technique: multiple real time longitudinal and transverse images were obtained using a high resolution ultrasound with a linear transducer, [employer] e 2008 model, 10-12 MHz frequencies. All measurements will be reported as longitudinal x scott-posterior x transverse. \par Comparison: Report dated 4/2016. \par \par Indication: multinodular goiter. \par \par Findings: \par The right thyroid lobe measures 2.61 x 1.90 x 1.54 cm. The left thyroid lobe measures 3.1 x 1.3 x 1.26 cm. The isthmus measures 0.29 cm. \par The right thyroid lobe has a heterogeneous parenchyma. \par The left thyroid lobe has a heterogeneous parenchyma . \par  \par In the right lower pole there is a  predominantly solid, with isoechoic solid component. It measures 1.56 x 0.9 x 1.2 cm. The nodule is round in shape and the border is smooth. The nodule has a halo. It demonstrates macrocalcifications. It has peripheral and scant internal vascularity. \par \par In the left lower pole there is a  mixed, predominantly solid, with isoechoic solid component. It measures 1.05 x 0.97 x 1.01 cm. The nodule is round in shape and the border is smooth. The nodule has a halo. It demonstrates macrocalcifications. It has peripheral and scant internal vascularity. \par \par Labs 7/2016:\par TSH 8.45\par Free T4 1.3\par TPO Ab neg.\par \par FNA Right Lower 4/2016: Benign\par \par Labs 3/29/16:\par TSH 7.08\par Free T4 1.4\par Anti-TPO Ab negative\par \par Thyroid Ultrasound Report 3/29/16:\par \par Technique: multiple real time longitudinal and transverse images were obtained using a high resolution ultrasound with a linear transducer, [employer] LOG[employer] e 2008 model, 10-12 MHz frequencies. All measurements will be reported as longitudinal x scott-posterior x transverse. \par Comparison: None. \par \par Indication: thyroid nodule. \par \par Findings: \par The right thyroid lobe measures 2.37 x 1.44 x 1.62 cm. The left thyroid lobe measures 2.11 x 1.18 x 1.39 cm. The isthmus measures 0.32 cm. \par The right thyroid lobe has a heterogeneous parenchyma. \par The left thyroid lobe has a heterogeneous parenchyma . \par  \par In the right lower pole there is a  predominantly solid, with isoechoic solid component. It measures 0.87 x 0.89 x 0.94 cm. The nodule is round in shape and the border is smooth. The nodule has a halo. It demonstrates macrocalcifications. It has peripheral and scant internal vascularity. \par \par In the right lower pole there is a  predominantly solid, with isoechoic solid component. It measures 1.05 x 0.97 x 1.01 cm. The nodule is round in shape and the border is smooth. The nodule has a halo. It demonstrates macrocalcifications. It has peripheral and scant internal vascularity. \par

## 2019-11-18 NOTE — PROCEDURE
[[employer] e 2008 model, 10-12 MHz frequencies] : multiple real time longitudinal and transverse images were obtained using a high resolution ultrasound with a linear transducer, [employer] e 2008 model, 10-12 MHz frequencies. All measurements will be reported as longitudinal x scott-posterior x transverse. [Multinodular Goiter] : multinodular goiter [Report dated ___] : Report dated [unfilled] [] : a heterogeneous parenchyma [Left Thyroid] : left [Lower] : lower pole there is a  [Isoechoic solid component] : with isoechoic solid component [Macrocalcifications] : macrocalcifications [Peripheral and scant  internal vascularity] : peripheral and scant internal vascularity [Predominantly Solid] : predominantly solid [Ovoid] : ovoid in shape [No calcifications] : no calcifications [Mixed] : mixed [Mid] : mid pole there is a  [Right Thyroid] : right [Round] : round in shape [Smooth] : smooth [Peripheral vascularity] : peripheral vascularity [Yes] : has a halo [No vascularity] : no vascularity [FreeTextEntry2] : 0.26 [FreeTextEntry5] : 3.51 x 1.44 x 1.66 [FreeTextEntry1] : 3.94 x 1.34 x 1.64 [FreeTextEntry3] : 0.91 x 0.76 x 0.89

## 2019-11-18 NOTE — HISTORY OF PRESENT ILLNESS
[FreeTextEntry1] : 45 y/o M diagnosed with thyroid nodules in 2013 which were found incidentally on MRI which was performed for facial pain s/p surgery for squamous cell cancer, also with possible facial nerve damage as he has been experiencing frequent facial spasms unrelieved by Botox. Seen initially by me 3/29/16 with thyroid U/S confirming 2 lower pole right thyroid nodules with microcalcifications largest 1.1cm. s/p FNA 10/2016 with benign findings. Last thyroid US 9/2018. Chemically with subclinical hypothyroidism at that time possibly secondary to radiation exposure from neck cancer so was started on levothyroxine 50 mcg daily. Dose increased to 88 mcg 7/2016. Dose again increased to 100 mcg after TSH found to be mildly elevated at 5.3 (10/2016). Repeat TFTs 1/2017 improved with TSH of 3.7.  TSH 4.86 dose increased to 112 mcg (10/2017) further increased to 125 mcg for TSH of 4.3 (4/2018). Repeat TSH 4.18 (1/2019) remained chemically euthyroid 7/2019 with TSH 3.46. Takes Synthroid daily in am on empty stomach, eats breakfast 1 hour later. Denies missing doses. Denies current fevers, chills, night sweats, chest pain, SOB, abdominal pain, nausea, vomiting, diarrhea, constipation. Reports chronic dry skin on hands, but no recent changes. Reports heat and cold intolerance - unable to tolerate extremes in weather. Reports chronic dysphagia and chronic neck pain. +occasional palpitations especially at night. Had been exposed to [facility] site. Has a history of radiation to the neck for treatment for squamous cell cancer - last RT August 2016. +weight stable.\par \par Of note, patient states that he follows with a urologist for history of nephrolithiasis; had his testosterone levels check for complaint of low energy and found to have low level. Underwent MRI head for further evaluation, was told that his pituitary was affected by prior RT, but also has a hx of anabolic steroid use. Was started on HCG injections weekly at the end of March 2017. Was on testosterone injection 200 mg every 2 weeks. Not looking to have children at this time, but wife may want more children. Has noticed increased mood swings. Recommended trial of Clomid 9/2018 with testosterone level of 230.9. Was taking 25 mg every other day. Had not noticed change in libido. Has been off Clomid since 1/2019 as insurance would not cover it. Also with persistent fatigue and low energy. Treated for sleep apnea with CPAP at night.

## 2019-11-18 NOTE — REVIEW OF SYSTEMS
[Fatigue] : fatigue [Dysphagia] : dysphagia [Neck Pain] : neck pain [Palpitations] : palpitations [Decreased Libido] : decreased libido [Dry Skin] : dry skin [Anxiety] : anxiety [Heat Intolerance] : heat intolerant [Cold Intolerance] : cold intolerant [All other systems negative] : All other systems negative [Recent Weight Loss (___ Lbs)] : no recent weight loss [Recent Weight Gain (___ Lbs)] : no recent weight gain [Fever] : no fever [Chills] : no chills [Chest Pain] : no chest pain [Heart Rate Is Slow] : the heart rate was not slow [Lower Ext Edema] : no lower extremity edema [Heart Rate Is Fast] : the heart rate was not fast [Shortness Of Breath] : no shortness of breath [Cough] : no cough [Nausea] : no nausea [Vomiting] : no vomiting was observed [Constipation] : no constipation [Diarrhea] : no diarrhea [Abdominal Pain] : no abdominal pain [Polyuria] : no polyuria [Erectile Dysfunction] : no erectile dysfunction [Headache] : no headaches [Tremors] : no tremors [Dizziness] : no dizziness [de-identified] : chronic heat and cold intolerance

## 2019-11-18 NOTE — DATA REVIEWED
[FreeTextEntry1] : Thyroid Ultrasound Report 11/18/19:\par \par Technique: multiple real time longitudinal and transverse images were obtained using a high resolution ultrasound with a linear transducer, [employer] e 2008 model, 10-12 MHz frequencies. All measurements will be reported as longitudinal x scott-posterior x transverse. \par Comparison: Report dated 9/2018. \par \par Indication: multinodular goiter. \par \par Findings: \par The right thyroid lobe measures 3.94 x 1.34 x 1.64 cm. The left thyroid lobe measures 3.51 x 1.44 x 1.66 cm. The isthmus measures 0.26 cm. \par The right thyroid lobe has a heterogeneous parenchyma. \par  \par In the right lower pole there is a  predominantly solid, with isoechoic solid component. It measures 0.92 x 0.95 x 0.92 cm. The nodule is round in shape and the border is smooth. The nodule has a halo. It demonstrates macrocalcifications. It has no vascularity. \par \par In the left lower pole there is a  mixed, predominantly solid, with isoechoic solid component. It measures 0.72 x 0.78 x 0.51 cm. The nodule is round in shape and the border is smooth. The nodule has a halo. It demonstrates macrocalcifications. It has peripheral and scant internal vascularity. \par \par In the right mid pole there is a  predominantly solid. It measures 0.91 x 0.76 x 0.89 cm. The nodule is ovoid in shape and the border is smooth. The nodule has a halo. It demonstrates no calcifications. It has no vascularity. \par \par In the right mid pole there is a  mixed. It measures 0.74 x 0.58 x 0.74 cm. The nodule is round in shape and the border is smooth. The nodule has a halo. It has peripheral vascularity. \par \par Labs 11/2019:\par CBC normal\par CMP normal except glucose of 68 and mildly elevated AST ALT\par \par HDL 49\par Chol 209\par Trig 166\par \par Labs 7/15/19:\par A1c 5.8%\par CMP normal\par TSH 3.46\par Free T4 1.8\par Testosterone 198\par LH 4.0\par FSH 1.5\par \par Labs 1/2019:\par TSH 4.18\par Free T4 1.8\par A1c 5.7%\par CMP normal\par Vit D 32\par \par Labs 11/28/18:\par Cr 1.35\par CMP normal\par \par HDL 47\par Chol 193\par Trig 114\par \par Thyroid Ultrasound Report 9/18/18:\par \par Technique: multiple real time longitudinal and transverse images were obtained using a high resolution ultrasound with a linear transducer, [employer] e 2008 model, 10-12 MHz frequencies. All measurements will be reported as longitudinal x scott-posterior x transverse. \par Comparison: Report dated 10/2017. \par \par Indication: multinodular goiter. \par \par Findings: \par The right thyroid lobe measures 3.23 x 1.98 x 1.40 cm. The left thyroid lobe measures 3.51 x 1.44 x 1.66 cm. The isthmus measures 0.31 cm. \par The right thyroid lobe has a heterogeneous parenchyma. \par The left thyroid lobe has a heterogeneous parenchyma . \par  \par In the right lower pole there is a  predominantly solid, with isoechoic solid component. It measures 1.06 x 0.83 x 0.94 cm. The nodule is round in shape and the border is smooth. The nodule has a halo. It demonstrates macrocalcifications. It has peripheral and scant internal vascularity. \par \par In the left lower pole there is a  mixed, predominantly solid, with isoechoic solid component. It measures 1.05 x 0.74 x 0.84 cm. The nodule is round in shape and the border is smooth. The nodule has a halo. It demonstrates macrocalcifications. It has peripheral and scant internal vascularity. \par \par In the right mid pole there is a  predominantly solid. It measures 1.11 x 0.70 x 0.81 cm. The nodule is ovoid in shape and the border is smooth. The nodule has a halo. It demonstrates no calcifications. It has peripheral vascularity. \par \par In the right mid pole there is a  mixed. It measures 0.83 x 0.82 x 1.05 cm. The nodule is round in shape and the border is smooth. The nodule has a halo. It has peripheral vascularity. \par \par \par Labs 4/2018:\par TSH 4.31\par Free T4 1.4\par \par Labs 12/2017\par Hb 17.7\par CMP normal\par \par HDL 44\par Chol 241\par Trig 77\par \par Labs 10/2017:\par Cortisol 8.4\par TSH 4.86\par Free T4 1.4\par ACTH 18\par Prolactin 14.7\par FSH <0.1\par LH < 0.1\par IGF-1 150\par Testosterone 64.4\par \par \par Thyroid Ultrasound Report 10/2017\par \par Technique: multiple real time longitudinal and transverse images were obtained using a high resolution ultrasound with a linear transducer, [employer] e 2008 model, 10-12 MHz frequencies. All measurements will be reported as longitudinal x scott-posterior x transverse. \par Comparison: Report dated 10/2016. \par \par Indication: multinodular goiter. \par \par Findings: \par The right thyroid lobe measures 2.53 x 1.38 x 1.34 cm. The left thyroid lobe measures 3.41 x 1.18 x 0.99 cm. The isthmus measures 0.26 cm. \par The right thyroid lobe has a heterogeneous parenchyma. \par The left thyroid lobe has a heterogeneous parenchyma . \par  \par In the right lower pole there is a  predominantly solid, with isoechoic solid component. It measures 1.45 x 0.87 x 0.87 cm. The nodule is round in shape and the border is smooth. The nodule has a halo. It demonstrates macrocalcifications. It has peripheral and scant internal vascularity. \par \par In the left lower pole there is a  mixed, predominantly solid, with isoechoic solid component. It measures 0.66 x 0.75 x 0.85 cm. The nodule is round in shape and the border is smooth. The nodule has a halo. It demonstrates macrocalcifications. It has peripheral and scant internal vascularity. \par Labs 1/11/17:\par \par HDL 40\par Chol 227\par Trig 98\par Creatinine 1.36\par CMP otherwise normal except AST of 51\par TSH 3.70\par \par Pathology Right Lower Pole Thyroid Nodule 10/2016: Benign\par \par Thyroid Ultrasound Report 10/18/16:\par \par Technique: multiple real time longitudinal and transverse images were obtained using a high resolution ultrasound with a linear transducer, [employer] e 2008 model, 10-12 MHz frequencies. All measurements will be reported as longitudinal x scott-posterior x transverse. \par Comparison: Report dated 4/2016. \par \par Indication: multinodular goiter. \par \par Findings: \par The right thyroid lobe measures 2.61 x 1.90 x 1.54 cm. The left thyroid lobe measures 3.1 x 1.3 x 1.26 cm. The isthmus measures 0.29 cm. \par The right thyroid lobe has a heterogeneous parenchyma. \par The left thyroid lobe has a heterogeneous parenchyma . \par  \par In the right lower pole there is a  predominantly solid, with isoechoic solid component. It measures 1.56 x 0.9 x 1.2 cm. The nodule is round in shape and the border is smooth. The nodule has a halo. It demonstrates macrocalcifications. It has peripheral and scant internal vascularity. \par \par In the left lower pole there is a  mixed, predominantly solid, with isoechoic solid component. It measures 1.05 x 0.97 x 1.01 cm. The nodule is round in shape and the border is smooth. The nodule has a halo. It demonstrates macrocalcifications. It has peripheral and scant internal vascularity. \par \par Labs 7/2016:\par TSH 8.45\par Free T4 1.3\par TPO Ab neg.\par \par FNA Right Lower 4/2016: Benign\par \par Labs 3/29/16:\par TSH 7.08\par Free T4 1.4\par Anti-TPO Ab negative\par \par Thyroid Ultrasound Report 3/29/16:\par \par Technique: multiple real time longitudinal and transverse images were obtained using a high resolution ultrasound with a linear transducer, [employer] e 2008 model, 10-12 MHz frequencies. All measurements will be reported as longitudinal x scott-posterior x transverse. \par Comparison: None. \par \par Indication: thyroid nodule. \par \par Findings: \par The right thyroid lobe measures 2.37 x 1.44 x 1.62 cm. The left thyroid lobe measures 2.11 x 1.18 x 1.39 cm. The isthmus measures 0.32 cm. \par The right thyroid lobe has a heterogeneous parenchyma. \par The left thyroid lobe has a heterogeneous parenchyma . \par  \par In the right lower pole there is a  predominantly solid, with isoechoic solid component. It measures 0.87 x 0.89 x 0.94 cm. The nodule is round in shape and the border is smooth. The nodule has a halo. It demonstrates macrocalcifications. It has peripheral and scant internal vascularity. \par \par In the right lower pole there is a  predominantly solid, with isoechoic solid component. It measures 1.05 x 0.97 x 1.01 cm. The nodule is round in shape and the border is smooth. The nodule has a halo. It demonstrates macrocalcifications. It has peripheral and scant internal vascularity. \par

## 2019-11-18 NOTE — IMPRESSION
[FreeTextEntry1] : Heterogenous gland, nonenlarged with solid thyroid nodules as above.  [FreeTextEntry2] : Check thyroid function tests today \par Repeat Thyroid US in 12 months

## 2019-11-20 LAB
ALBUMIN SERPL ELPH-MCNC: 4.4 G/DL
ALP BLD-CCNC: 94 U/L
ALT SERPL-CCNC: 101 U/L
ANION GAP SERPL CALC-SCNC: 13 MMOL/L
AST SERPL-CCNC: 48 U/L
BILIRUB SERPL-MCNC: 0.3 MG/DL
BUN SERPL-MCNC: 17 MG/DL
CALCIUM SERPL-MCNC: 9.8 MG/DL
CHLORIDE SERPL-SCNC: 103 MMOL/L
CO2 SERPL-SCNC: 25 MMOL/L
CREAT SERPL-MCNC: 1.01 MG/DL
GLUCOSE SERPL-MCNC: 79 MG/DL
POTASSIUM SERPL-SCNC: 4.2 MMOL/L
PROT SERPL-MCNC: 6.6 G/DL
SODIUM SERPL-SCNC: 141 MMOL/L
T4 FREE SERPL-MCNC: 1.5 NG/DL
TSH SERPL-ACNC: 2.58 UIU/ML

## 2019-12-27 ENCOUNTER — INBOUND DOCUMENT (OUTPATIENT)
Age: 44
End: 2019-12-27

## 2020-03-17 ENCOUNTER — NON-APPOINTMENT (OUTPATIENT)
Age: 45
End: 2020-03-17

## 2020-04-08 ENCOUNTER — FORM ENCOUNTER (OUTPATIENT)
Age: 45
End: 2020-04-08

## 2020-05-05 ENCOUNTER — APPOINTMENT (OUTPATIENT)
Dept: OTHER | Facility: CLINIC | Age: 45
End: 2020-05-05
Payer: COMMERCIAL

## 2020-05-05 PROCEDURE — 99443: CPT | Mod: CR

## 2020-05-19 ENCOUNTER — FORM ENCOUNTER (OUTPATIENT)
Age: 45
End: 2020-05-19

## 2020-05-26 ENCOUNTER — APPOINTMENT (OUTPATIENT)
Dept: SURGERY | Facility: CLINIC | Age: 45
End: 2020-05-26
Payer: COMMERCIAL

## 2020-05-26 LAB
ALBUMIN SERPL ELPH-MCNC: 4.3 G/DL
ALP BLD-CCNC: 97 U/L
ALT SERPL-CCNC: 35 U/L
AST SERPL-CCNC: 22 U/L
BILIRUB DIRECT SERPL-MCNC: 0.1 MG/DL
BILIRUB INDIRECT SERPL-MCNC: 0.2 MG/DL
BILIRUB SERPL-MCNC: 0.3 MG/DL
PROT SERPL-MCNC: 6.6 G/DL
T3 SERPL-MCNC: 117 NG/DL
T4 FREE SERPL-MCNC: 1.7 NG/DL
TSH SERPL-ACNC: 3.73 UIU/ML

## 2020-05-26 PROCEDURE — 36415 COLL VENOUS BLD VENIPUNCTURE: CPT

## 2020-05-26 PROCEDURE — 99213 OFFICE O/P EST LOW 20 MIN: CPT

## 2020-05-26 NOTE — HISTORY OF PRESENT ILLNESS
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  denies pain, bleeding, dysphagia, hoarseness or new lesions.  . last thyroid sonogram unchanged.  being treated for GERD.  not able to work. continues Synthroid 125 mcg daily.

## 2020-05-26 NOTE — ASSESSMENT
[FreeTextEntry1] : will observe.thyroid sonogram   per dr early. to return earlier if any change. bloods drawn. to call next week for results

## 2020-05-26 NOTE — PHYSICAL EXAM
[de-identified] : changes from prior surgery [de-identified] : no palpable thyroid nodules [Laryngoscopy Performed] : laryngoscopy was performed, see procedure section for findings [Midline] : located in midline position [Normal] : orientation to person, place, and time: normal [FreeTextEntry2] : no change in trismus

## 2020-05-27 LAB
THYROGLOB AB SERPL-ACNC: <20 IU/ML
THYROPEROXIDASE AB SERPL IA-ACNC: <10 IU/ML

## 2020-06-30 ENCOUNTER — APPOINTMENT (OUTPATIENT)
Dept: ENDOCRINOLOGY | Facility: CLINIC | Age: 45
End: 2020-06-30
Payer: COMMERCIAL

## 2020-06-30 VITALS
TEMPERATURE: 98 F | WEIGHT: 260 LBS | HEIGHT: 73 IN | SYSTOLIC BLOOD PRESSURE: 126 MMHG | BODY MASS INDEX: 34.46 KG/M2 | HEART RATE: 90 BPM | OXYGEN SATURATION: 95 % | DIASTOLIC BLOOD PRESSURE: 82 MMHG

## 2020-06-30 PROCEDURE — 99214 OFFICE O/P EST MOD 30 MIN: CPT

## 2020-06-30 NOTE — ASSESSMENT
[FreeTextEntry1] : 45 y/o M w/multinodular goiter on Synthroid for radiation induced hypothyroidism here for follow up.\par \par 1. Multinodular Goiter- Thyroid U/S FNA  performed 10/2016 for increased nodule size- results revealed multinodular goiter. Repeat thyroid US performed 10/2017 and 9/2018 with stable nodules (see results section for full description). Repeat thyroid US 11/2019 stable (see results section for full description). Repeat in 6-12 months to assess for stability in size. \par \par 2. Hypothyroidism- started on levothyroxine. Now on Synthroid 125 mcg. Last TFTs 5/2020 TSH at goal. Will recheck TFTs at next visit. C/w Synthroid 125 will adjust as needed. \par \par 3. Hypogonadism- s/p HCG with trial of Clomid. Likely due to hx of anabolic steroid abuse in the past vs. radiation induced. Check testosterone, SHBG, LH, FSH, estradiol, prolactin. If testosterone remains low would consider tx with testosterone given symptoms. WOuld prefer to optimize FREDI and weight first. \par \par 4. Scalp Lesion- posterior left sided scalp lesion not previously noted on exam. Will follow-up with head and neck surgery.

## 2020-06-30 NOTE — PHYSICAL EXAM
[Alert] : alert [Well Nourished] : well nourished [Healthy Appearance] : healthy appearance [No Acute Distress] : no acute distress [Well Developed] : well developed [No Proptosis] : no proptosis [No Respiratory Distress] : no respiratory distress [No Accessory Muscle Use] : no accessory muscle use [Clear to Auscultation] : lungs were clear to auscultation bilaterally [Normal to Percussion] : lungs were normal to percussion [Regular Rhythm] : with a regular rhythm [Normal Rate] : heart rate was normal [Normal S1, S2] : normal S1 and S2 [Normal Bowel Sounds] : normal bowel sounds [No Edema] : no peripheral edema [Not Distended] : not distended [Soft] : abdomen soft [Not Tender] : non-tender [No Stigmata of Cushings Syndrome] : no stigmata of Cushings Syndrome [Kyphosis] : no kyphosis present [Normal Strength/Tone] : muscle strength and tone were normal [No Clubbing, Cyanosis] : no clubbing  or cyanosis of the fingernails [No Motor Deficits] : the motor exam was normal [No Tremors] : no tremors [Oriented x3] : oriented to person, place, and time [Normal Mood] : the mood was normal [Normal Affect] : the affect was normal [de-identified] : +left sided posterior palpable scalp lesion [de-identified] : +palpable scar tissue in the neck; heterogenous thyroid

## 2020-06-30 NOTE — REVIEW OF SYSTEMS
[Recent Weight Loss (___ Lbs)] : no recent weight loss [Fever] : no fever [Chills] : no chills [Neck Pain] : neck pain [Heart Rate Is Slow] : the heart rate was not slow [Heart Rate Is Fast] : the heart rate was not fast [Lower Ext Edema] : no lower extremity edema [Cough] : no cough [Abdominal Pain] : no abdominal pain [Erectile Dysfunction] : no erectile dysfunction [Decreased Libido] : decreased libido [Dry Skin] : dry skin [Headache] : no headaches [Dizziness] : no dizziness [Anxiety] : anxiety [de-identified] : chronic heat and cold intolerance [Fatigue] : fatigue [Visual Field Defect] : no visual field defect [Recent Weight Gain (___ Lbs)] : recent weight gain: [unfilled] lbs [Dysphagia] : no dysphagia [Dysphonia] : no dysphonia [Shortness Of Breath] : no shortness of breath [Chest Pain] : no chest pain [Palpitations] : no palpitations [Nausea] : no nausea [Vomiting] : no vomiting [Constipation] : no constipation [Polyuria] : no polyuria [Diarrhea] : no diarrhea [Joint Pain] : no joint pain [Headaches] : no headaches [Insomnia] : insomnia [Tremors] : no tremors [Heat Intolerance] : no heat intolerance [Polydipsia] : no polydipsia [Cold Intolerance] : no cold intolerance [All other systems negative] : All other systems negative

## 2020-06-30 NOTE — DATA REVIEWED
[FreeTextEntry1] : Labs 5/2020:\par TSH 3.73\par Free T4 1.7\par TPO Ab neg.\par LFTs normal\par \par \par Thyroid Ultrasound Report 11/18/19:\par \par Technique: multiple real time longitudinal and transverse images were obtained using a high resolution ultrasound with a linear transducer, [employer] e 2008 model, 10-12 MHz frequencies. All measurements will be reported as longitudinal x scott-posterior x transverse. \par Comparison: Report dated 9/2018. \par \par Indication: multinodular goiter. \par \par Findings: \par The right thyroid lobe measures 3.94 x 1.34 x 1.64 cm. The left thyroid lobe measures 3.51 x 1.44 x 1.66 cm. The isthmus measures 0.26 cm. \par The right thyroid lobe has a heterogeneous parenchyma. \par  \par In the right lower pole there is a  predominantly solid, with isoechoic solid component. It measures 0.92 x 0.95 x 0.92 cm. The nodule is round in shape and the border is smooth. The nodule has a halo. It demonstrates macrocalcifications. It has no vascularity. \par \par In the left lower pole there is a  mixed, predominantly solid, with isoechoic solid component. It measures 0.72 x 0.78 x 0.51 cm. The nodule is round in shape and the border is smooth. The nodule has a halo. It demonstrates macrocalcifications. It has peripheral and scant internal vascularity. \par \par In the right mid pole there is a  predominantly solid. It measures 0.91 x 0.76 x 0.89 cm. The nodule is ovoid in shape and the border is smooth. The nodule has a halo. It demonstrates no calcifications. It has no vascularity. \par \par In the right mid pole there is a  mixed. It measures 0.74 x 0.58 x 0.74 cm. The nodule is round in shape and the border is smooth. The nodule has a halo. It has peripheral vascularity. \par \par Labs 11/2019:\par CBC normal\par CMP normal except glucose of 68 and mildly elevated AST ALT\par \par HDL 49\par Chol 209\par Trig 166\par \par Labs 7/15/19:\par A1c 5.8%\par CMP normal\par TSH 3.46\par Free T4 1.8\par Testosterone 198\par LH 4.0\par FSH 1.5\par \par Labs 1/2019:\par TSH 4.18\par Free T4 1.8\par A1c 5.7%\par CMP normal\par Vit D 32\par \par Labs 11/28/18:\par Cr 1.35\par CMP normal\par \par HDL 47\par Chol 193\par Trig 114\par \par Thyroid Ultrasound Report 9/18/18:\par \par Technique: multiple real time longitudinal and transverse images were obtained using a high resolution ultrasound with a linear transducer, [employer] e 2008 model, 10-12 MHz frequencies. All measurements will be reported as longitudinal x scott-posterior x transverse. \par Comparison: Report dated 10/2017. \par \par Indication: multinodular goiter. \par \par Findings: \par The right thyroid lobe measures 3.23 x 1.98 x 1.40 cm. The left thyroid lobe measures 3.51 x 1.44 x 1.66 cm. The isthmus measures 0.31 cm. \par The right thyroid lobe has a heterogeneous parenchyma. \par The left thyroid lobe has a heterogeneous parenchyma . \par  \par In the right lower pole there is a  predominantly solid, with isoechoic solid component. It measures 1.06 x 0.83 x 0.94 cm. The nodule is round in shape and the border is smooth. The nodule has a halo. It demonstrates macrocalcifications. It has peripheral and scant internal vascularity. \par \par In the left lower pole there is a  mixed, predominantly solid, with isoechoic solid component. It measures 1.05 x 0.74 x 0.84 cm. The nodule is round in shape and the border is smooth. The nodule has a halo. It demonstrates macrocalcifications. It has peripheral and scant internal vascularity. \par \par In the right mid pole there is a  predominantly solid. It measures 1.11 x 0.70 x 0.81 cm. The nodule is ovoid in shape and the border is smooth. The nodule has a halo. It demonstrates no calcifications. It has peripheral vascularity. \par \par In the right mid pole there is a  mixed. It measures 0.83 x 0.82 x 1.05 cm. The nodule is round in shape and the border is smooth. The nodule has a halo. It has peripheral vascularity. \par \par \par Labs 4/2018:\par TSH 4.31\par Free T4 1.4\par \par Labs 12/2017\par Hb 17.7\par CMP normal\par \par HDL 44\par Chol 241\par Trig 77\par \par Labs 10/2017:\par Cortisol 8.4\par TSH 4.86\par Free T4 1.4\par ACTH 18\par Prolactin 14.7\par FSH <0.1\par LH < 0.1\par IGF-1 150\par Testosterone 64.4\par \par \par Thyroid Ultrasound Report 10/2017\par \par Technique: multiple real time longitudinal and transverse images were obtained using a high resolution ultrasound with a linear transducer, [employer] e 2008 model, 10-12 MHz frequencies. All measurements will be reported as longitudinal x scott-posterior x transverse. \par Comparison: Report dated 10/2016. \par \par Indication: multinodular goiter. \par \par Findings: \par The right thyroid lobe measures 2.53 x 1.38 x 1.34 cm. The left thyroid lobe measures 3.41 x 1.18 x 0.99 cm. The isthmus measures 0.26 cm. \par The right thyroid lobe has a heterogeneous parenchyma. \par The left thyroid lobe has a heterogeneous parenchyma . \par  \par In the right lower pole there is a  predominantly solid, with isoechoic solid component. It measures 1.45 x 0.87 x 0.87 cm. The nodule is round in shape and the border is smooth. The nodule has a halo. It demonstrates macrocalcifications. It has peripheral and scant internal vascularity. \par \par In the left lower pole there is a  mixed, predominantly solid, with isoechoic solid component. It measures 0.66 x 0.75 x 0.85 cm. The nodule is round in shape and the border is smooth. The nodule has a halo. It demonstrates macrocalcifications. It has peripheral and scant internal vascularity. \par Labs 1/11/17:\par \par HDL 40\par Chol 227\par Trig 98\par Creatinine 1.36\par CMP otherwise normal except AST of 51\par TSH 3.70\par \par Pathology Right Lower Pole Thyroid Nodule 10/2016: Benign\par \par Thyroid Ultrasound Report 10/18/16:\par \par Technique: multiple real time longitudinal and transverse images were obtained using a high resolution ultrasound with a linear transducer, [employer] e 2008 model, 10-12 MHz frequencies. All measurements will be reported as longitudinal x scott-posterior x transverse. \par Comparison: Report dated 4/2016. \par \par Indication: multinodular goiter. \par \par Findings: \par The right thyroid lobe measures 2.61 x 1.90 x 1.54 cm. The left thyroid lobe measures 3.1 x 1.3 x 1.26 cm. The isthmus measures 0.29 cm. \par The right thyroid lobe has a heterogeneous parenchyma. \par The left thyroid lobe has a heterogeneous parenchyma . \par  \par In the right lower pole there is a  predominantly solid, with isoechoic solid component. It measures 1.56 x 0.9 x 1.2 cm. The nodule is round in shape and the border is smooth. The nodule has a halo. It demonstrates macrocalcifications. It has peripheral and scant internal vascularity. \par \par In the left lower pole there is a  mixed, predominantly solid, with isoechoic solid component. It measures 1.05 x 0.97 x 1.01 cm. The nodule is round in shape and the border is smooth. The nodule has a halo. It demonstrates macrocalcifications. It has peripheral and scant internal vascularity. \par \par Labs 7/2016:\par TSH 8.45\par Free T4 1.3\par TPO Ab neg.\par \par FNA Right Lower 4/2016: Benign\par \par Labs 3/29/16:\par TSH 7.08\par Free T4 1.4\par Anti-TPO Ab negative\par \par Thyroid Ultrasound Report 3/29/16:\par \par Technique: multiple real time longitudinal and transverse images were obtained using a high resolution ultrasound with a linear transducer, [employer] e 2008 model, 10-12 MHz frequencies. All measurements will be reported as longitudinal x scott-posterior x transverse. \par Comparison: None. \par \par Indication: thyroid nodule. \par \par Findings: \par The right thyroid lobe measures 2.37 x 1.44 x 1.62 cm. The left thyroid lobe measures 2.11 x 1.18 x 1.39 cm. The isthmus measures 0.32 cm. \par The right thyroid lobe has a heterogeneous parenchyma. \par The left thyroid lobe has a heterogeneous parenchyma . \par  \par In the right lower pole there is a  predominantly solid, with isoechoic solid component. It measures 0.87 x 0.89 x 0.94 cm. The nodule is round in shape and the border is smooth. The nodule has a halo. It demonstrates macrocalcifications. It has peripheral and scant internal vascularity. \par \par In the right lower pole there is a  predominantly solid, with isoechoic solid component. It measures 1.05 x 0.97 x 1.01 cm. The nodule is round in shape and the border is smooth. The nodule has a halo. It demonstrates macrocalcifications. It has peripheral and scant internal vascularity. \par

## 2020-06-30 NOTE — HISTORY OF PRESENT ILLNESS
[FreeTextEntry1] : 43 y/o M diagnosed with thyroid nodules in 2013 which were found incidentally on MRI which was performed for facial pain s/p surgery for squamous cell cancer, also with possible facial nerve damage as he has been experiencing frequent facial spasms unrelieved by Botox. Seen initially by me 3/29/16 with thyroid U/S confirming 2 lower pole right thyroid nodules with microcalcifications largest 1.1cm. s/p FNA 10/2016 with benign findings. Last thyroid US 9/2018. Chemically with subclinical hypothyroidism at that time possibly secondary to radiation exposure from neck cancer so was started on levothyroxine 50 mcg daily. Dose increased to 88 mcg 7/2016. Dose again increased to 100 mcg after TSH found to be mildly elevated at 5.3 (10/2016). Repeat TFTs 1/2017 improved with TSH of 3.7.  TSH 4.86 dose increased to 112 mcg (10/2017) further increased to 125 mcg for TSH of 4.3 (4/2018). Repeat TSH 4.18 (1/2019) and has remained chemically euthyroid since then with last TFTs 5/2020. Takes Synthroid daily in am on empty stomach, eats breakfast 1 hour later. Denies missing doses. Denies current fevers, chills, night sweats, chest pain, SOB, abdominal pain, nausea, vomiting, diarrhea, constipation. Reports chronic dry skin on hands, but no recent changes. Reports heat and cold intolerance - unable to tolerate extremes in weather. Reports chronic dysphagia and chronic neck pain. +occasional palpitations especially at night. Had been exposed to [facility] site. Has a history of radiation to the neck for treatment for squamous cell cancer - last RT August 2016. +weight gain.\par \par Of note, patient states that he follows with a urologist for history of nephrolithiasis; had his testosterone levels check for complaint of low energy and found to have low level. Underwent MRI head for further evaluation, was told that his pituitary was affected by prior RT, but also has a hx of anabolic steroid use. Was started on HCG injections weekly at the end of March 2017. Was on testosterone injection 200 mg every 2 weeks. Not looking to have children at this time. Has noticed increased mood swings. Recommended trial of Clomid 9/2018 with testosterone level of 230.9. Was taking 25 mg every other day. Had not noticed change in libido. Has been off Clomid since 1/2019 as insurance would not cover it. Also with persistent fatigue and low energy. Treated for sleep apnea with CPAP at night, but occasionally nonadherent.

## 2020-06-30 NOTE — PROCEDURE
[[employer] e 2008 model, 10-12 MHz frequencies] : multiple real time longitudinal and transverse images were obtained using a high resolution ultrasound with a linear transducer, [employer] e 2008 model, 10-12 MHz frequencies. All measurements will be reported as longitudinal x scott-posterior x transverse. [Report dated ___] : Report dated [unfilled] [] : a heterogeneous parenchyma [Multinodular Goiter] : multinodular goiter [Lower] : lower pole there is a  [Left Thyroid] : left [Isoechoic solid component] : with isoechoic solid component [Peripheral and scant  internal vascularity] : peripheral and scant internal vascularity [Macrocalcifications] : macrocalcifications [Predominantly Solid] : predominantly solid [Ovoid] : ovoid in shape [No vascularity] : no vascularity [Right Thyroid] : right [No calcifications] : no calcifications [Round] : round in shape [Mixed] : mixed [Mid] : mid pole there is a  [Yes] : has a halo [Smooth] : smooth [Peripheral vascularity] : peripheral vascularity [FreeTextEntry1] : 3.94 x 1.34 x 1.64 [FreeTextEntry5] : 3.51 x 1.44 x 1.66 [FreeTextEntry2] : 0.26 [FreeTextEntry3] : 0.74 x 0.58 x 0.74

## 2020-07-02 LAB
LH SERPL-ACNC: 3.4 IU/L
SHBG SERPL-SCNC: 19 NMOL/L

## 2020-07-07 ENCOUNTER — FORM ENCOUNTER (OUTPATIENT)
Age: 45
End: 2020-07-07

## 2020-07-08 ENCOUNTER — FORM ENCOUNTER (OUTPATIENT)
Age: 45
End: 2020-07-08

## 2020-07-13 LAB
MONOMERIC PROLACTIN (ICMA)*: 11 NG/ML
PERCENT MACROPROLACTIN: 48 %
PROLACTIN, SERUM (ICMA)*: 21 NG/ML

## 2020-07-14 ENCOUNTER — OUTPATIENT (OUTPATIENT)
Dept: OUTPATIENT SERVICES | Facility: HOSPITAL | Age: 45
LOS: 1 days | End: 2020-07-14
Payer: COMMERCIAL

## 2020-07-14 ENCOUNTER — APPOINTMENT (OUTPATIENT)
Dept: CT IMAGING | Facility: IMAGING CENTER | Age: 45
End: 2020-07-14
Payer: COMMERCIAL

## 2020-07-14 ENCOUNTER — APPOINTMENT (OUTPATIENT)
Dept: SURGERY | Facility: CLINIC | Age: 45
End: 2020-07-14
Payer: COMMERCIAL

## 2020-07-14 DIAGNOSIS — L98.9 DISORDER OF THE SKIN AND SUBCUTANEOUS TISSUE, UNSPECIFIED: ICD-10-CM

## 2020-07-14 DIAGNOSIS — C44.320 SQUAMOUS CELL CARCINOMA OF SKIN OF UNSPECIFIED PARTS OF FACE: ICD-10-CM

## 2020-07-14 LAB
ESTRADIOL SERPL-MCNC: 24 PG/ML
FSH SERPL-MCNC: 1.8 IU/L
PROLACTIN SERPL-MCNC: 15.5 NG/ML
TESTOST SERPL-MCNC: 265 NG/DL

## 2020-07-14 PROCEDURE — 82565 ASSAY OF CREATININE: CPT

## 2020-07-14 PROCEDURE — 99213 OFFICE O/P EST LOW 20 MIN: CPT

## 2020-07-14 PROCEDURE — 70491 CT SOFT TISSUE NECK W/DYE: CPT | Mod: 26

## 2020-07-14 PROCEDURE — 70491 CT SOFT TISSUE NECK W/DYE: CPT

## 2020-07-14 NOTE — HISTORY OF PRESENT ILLNESS
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  denies pain, bleeding, dysphagia, hoarseness or new lesions.  . recent thyroid sonogram unchanged.   continues Synthroid 125 mcg daily.  returns earlier than scheduled c/o posterior scalp swelling of unknown duration.  denies infection or history of trauma

## 2020-07-14 NOTE — PHYSICAL EXAM
[de-identified] : changes from prior surgery [de-identified] : no palpable thyroid nodules [Laryngoscopy Performed] : laryngoscopy was performed, see procedure section for findings [Midline] : located in midline position [Normal] : cranial nerves 2-12 intact [FreeTextEntry2] : no change in trismus.  left posterior scalp swelling overlying nuchal ridge

## 2020-08-11 ENCOUNTER — FORM ENCOUNTER (OUTPATIENT)
Age: 45
End: 2020-08-11

## 2020-10-15 ENCOUNTER — APPOINTMENT (OUTPATIENT)
Dept: OTHER | Facility: CLINIC | Age: 45
End: 2020-10-15
Payer: COMMERCIAL

## 2020-10-15 DIAGNOSIS — Z23 ENCOUNTER FOR IMMUNIZATION: ICD-10-CM

## 2020-10-15 PROCEDURE — 99396 PREV VISIT EST AGE 40-64: CPT | Mod: 95

## 2020-10-15 PROCEDURE — 99442: CPT | Mod: 95

## 2020-10-19 ENCOUNTER — MED ADMIN CHARGE (OUTPATIENT)
Age: 45
End: 2020-10-19

## 2020-10-19 ENCOUNTER — APPOINTMENT (OUTPATIENT)
Dept: OTHER | Facility: CLINIC | Age: 45
End: 2020-10-19

## 2020-10-19 DIAGNOSIS — Z23 ENCOUNTER FOR IMMUNIZATION: ICD-10-CM

## 2020-10-20 LAB
ALBUMIN SERPL ELPH-MCNC: 4.6 G/DL
ALP BLD-CCNC: 90 U/L
ALT SERPL-CCNC: 79 U/L
ANION GAP SERPL CALC-SCNC: 18 MMOL/L
APPEARANCE: CLEAR
AST SERPL-CCNC: 51 U/L
BACTERIA: NEGATIVE
BASOPHILS # BLD AUTO: 0.03 K/UL
BASOPHILS NFR BLD AUTO: 0.5 %
BILIRUB SERPL-MCNC: 0.4 MG/DL
BILIRUBIN URINE: NEGATIVE
BLOOD URINE: NEGATIVE
BUN SERPL-MCNC: 15 MG/DL
CALCIUM SERPL-MCNC: 9.9 MG/DL
CHLORIDE SERPL-SCNC: 102 MMOL/L
CHOLEST SERPL-MCNC: 219 MG/DL
CHOLEST/HDLC SERPL: 4.4 RATIO
CO2 SERPL-SCNC: 23 MMOL/L
COLOR: NORMAL
CREAT SERPL-MCNC: 1.21 MG/DL
EOSINOPHIL # BLD AUTO: 0.08 K/UL
EOSINOPHIL NFR BLD AUTO: 1.4 %
GLUCOSE QUALITATIVE U: NEGATIVE
GLUCOSE SERPL-MCNC: 79 MG/DL
HCT VFR BLD CALC: 52.1 %
HDLC SERPL-MCNC: 50 MG/DL
HGB BLD-MCNC: 16.5 G/DL
HYALINE CASTS: 0 /LPF
IMM GRANULOCYTES NFR BLD AUTO: 0.2 %
KETONES URINE: NEGATIVE
LDLC SERPL CALC-MCNC: 128 MG/DL
LEUKOCYTE ESTERASE URINE: NEGATIVE
LYMPHOCYTES # BLD AUTO: 1.85 K/UL
LYMPHOCYTES NFR BLD AUTO: 33.3 %
MAN DIFF?: NORMAL
MCHC RBC-ENTMCNC: 28.4 PG
MCHC RBC-ENTMCNC: 31.7 GM/DL
MCV RBC AUTO: 89.7 FL
MICROSCOPIC-UA: NORMAL
MONOCYTES # BLD AUTO: 0.51 K/UL
MONOCYTES NFR BLD AUTO: 9.2 %
NEUTROPHILS # BLD AUTO: 3.07 K/UL
NEUTROPHILS NFR BLD AUTO: 55.4 %
NITRITE URINE: NEGATIVE
PH URINE: 6.5
PLATELET # BLD AUTO: 245 K/UL
POTASSIUM SERPL-SCNC: 4.6 MMOL/L
PROT SERPL-MCNC: 7.2 G/DL
PROTEIN URINE: NEGATIVE
RBC # BLD: 5.81 M/UL
RBC # FLD: 14.4 %
RED BLOOD CELLS URINE: 1 /HPF
SODIUM SERPL-SCNC: 143 MMOL/L
SPECIFIC GRAVITY URINE: 1.01
SQUAMOUS EPITHELIAL CELLS: 0 /HPF
TRIGL SERPL-MCNC: 205 MG/DL
UROBILINOGEN URINE: NORMAL
WBC # FLD AUTO: 5.55 K/UL
WHITE BLOOD CELLS URINE: 0 /HPF

## 2020-10-29 ENCOUNTER — TRANSCRIPTION ENCOUNTER (OUTPATIENT)
Age: 45
End: 2020-10-29

## 2020-12-08 ENCOUNTER — APPOINTMENT (OUTPATIENT)
Dept: ENDOCRINOLOGY | Facility: CLINIC | Age: 45
End: 2020-12-08
Payer: COMMERCIAL

## 2020-12-08 VITALS
BODY MASS INDEX: 35.52 KG/M2 | HEIGHT: 73 IN | OXYGEN SATURATION: 97 % | DIASTOLIC BLOOD PRESSURE: 56 MMHG | RESPIRATION RATE: 16 BRPM | WEIGHT: 268 LBS | SYSTOLIC BLOOD PRESSURE: 112 MMHG | HEART RATE: 80 BPM | TEMPERATURE: 97.6 F

## 2020-12-08 PROCEDURE — 99214 OFFICE O/P EST MOD 30 MIN: CPT

## 2020-12-08 RX ORDER — LOSARTAN POTASSIUM 25 MG/1
25 TABLET, FILM COATED ORAL
Refills: 0 | Status: COMPLETED | COMMUNITY
End: 2020-12-08

## 2020-12-08 NOTE — ASSESSMENT
[FreeTextEntry1] : 46 y/o M w/multinodular goiter on Synthroid for radiation induced hypothyroidism here for follow up.\par \par 1. Multinodular Goiter- Thyroid U/S FNA  performed 10/2016 for increased nodule size- results revealed multinodular goiter. Repeat thyroid US performed 10/2017, 9/2018, 9/2019 with stable nodules (see results section for full description). Repeat thyroid US next visit.\par \par 2. Hypothyroidism- started on levothyroxine. Now on Synthroid 125 mcg. Will recheck TFTs today. C/w Synthroid 125 will adjust as needed. Clinically euthyroid on exam.\par \par 3. Hypogonadism- s/p HCG with trial of Clomid. Likely due to hx of anabolic steroid abuse in the past vs. radiation induced. Check testosterone, SHBG, LH, FSH, estradiol, prolactin. If testosterone remains low would consider tx with testosterone given symptoms. Would prefer to optimize FREDI and weight first. \par \par 4. Scalp Lesion- posterior left sided scalp lesion. Followed up with head and neck with CT evidence of post-op debris. Can continue to monitor.

## 2020-12-08 NOTE — REVIEW OF SYSTEMS
[Fatigue] : fatigue [Recent Weight Gain (___ Lbs)] : recent weight gain: [unfilled] lbs [Insomnia] : insomnia [All other systems negative] : All other systems negative [Visual Field Defect] : no visual field defect [Dysphagia] : no dysphagia [Dysphonia] : no dysphonia [Chest Pain] : no chest pain [Palpitations] : no palpitations [Shortness Of Breath] : no shortness of breath [Nausea] : no nausea [Constipation] : no constipation [Vomiting] : no vomiting [Diarrhea] : no diarrhea [Polyuria] : no polyuria [Joint Pain] : no joint pain [Headaches] : no headaches [Tremors] : no tremors [Polydipsia] : no polydipsia [Cold Intolerance] : no cold intolerance [Heat Intolerance] : no heat intolerance

## 2020-12-08 NOTE — PROCEDURE
[[employer] e 2008 model, 10-12 MHz frequencies] : multiple real time longitudinal and transverse images were obtained using a high resolution ultrasound with a linear transducer, [employer] e 2008 model, 10-12 MHz frequencies. All measurements will be reported as longitudinal x scott-posterior x transverse. [Report dated ___] : Report dated [unfilled] [Multinodular Goiter] : multinodular goiter [] : a heterogeneous parenchyma  [Left Thyroid] : left [Lower] : lower pole there is a  [Isoechoic solid component] : with isoechoic solid component [Macrocalcifications] : macrocalcifications [Peripheral and scant  internal vascularity] : peripheral and scant internal vascularity [Predominantly Solid] : predominantly solid [Ovoid] : ovoid in shape [No calcifications] : no calcifications [No vascularity] : no vascularity [Right Thyroid] : right [Mid] : mid pole there is a  [Mixed] : mixed [Round] : round in shape [Smooth] : smooth [Yes] : has a halo [Peripheral vascularity] : peripheral vascularity [FreeTextEntry1] : 3.94 x 1.34 x 1.64 [FreeTextEntry5] : 3.51 x 1.44 x 1.66 [FreeTextEntry2] : 0.26 [FreeTextEntry3] : 0.74 x 0.58 x 0.74

## 2020-12-08 NOTE — HISTORY OF PRESENT ILLNESS
[FreeTextEntry1] : 44 y/o M diagnosed with thyroid nodules in 2013 which were found incidentally on MRI which was performed for facial pain s/p surgery for squamous cell cancer, also with possible facial nerve damage as he has been experiencing frequent facial spasms unrelieved by Botox. Seen initially by me 3/29/16 with thyroid U/S confirming 2 lower pole right thyroid nodules with microcalcifications largest 1.1cm. s/p FNA 10/2016 with benign findings. Last thyroid US 9/2018. Chemically with subclinical hypothyroidism at that time possibly secondary to radiation exposure from neck cancer so was started on levothyroxine 50 mcg daily. Dose increased to 88 mcg 7/2016. Dose again increased to 100 mcg after TSH found to be mildly elevated at 5.3 (10/2016). Repeat TFTs 1/2017 improved with TSH of 3.7.  TSH 4.86 dose increased to 112 mcg (10/2017) further increased to 125 mcg for TSH of 4.3 (4/2018). Repeat TSH 4.18 (1/2019) and has remained chemically euthyroid since then with last TFTs 5/2020. Takes Synthroid daily in am on empty stomach, eats breakfast 1 hour later. Denies missing doses. Denies current fevers, chills, night sweats, chest pain, SOB, abdominal pain, nausea, vomiting, diarrhea, constipation. Reports chronic dry skin on hands, but no recent changes. Reports heat and cold intolerance - unable to tolerate extremes in weather. Reports chronic dysphagia and chronic neck pain. +occasional palpitations especially at night. Had been exposed to [facility] site. Has a history of radiation to the neck for treatment for squamous cell cancer - last RT August 2016. +weight gain.\par \par Of note, patient states that he follows with a urologist for history of nephrolithiasis; had his testosterone levels check for complaint of low energy and found to have low level. Underwent MRI head for further evaluation, was told that his pituitary was affected by prior RT, but also has a hx of anabolic steroid use. Was started on HCG injections weekly at the end of March 2017. Was on testosterone injection 200 mg every 2 weeks. Not looking to have children at this time. Has noticed increased mood swings. Recommended trial of Clomid 9/2018 with testosterone level of 230.9. Was taking 25 mg every other day. Had not noticed change in libido. Has been off Clomid since 1/2019 as insurance would not cover it. Also with persistent fatigue and low energy. Treated for sleep apnea with CPAP at night, but occasionally nonadherent.

## 2020-12-08 NOTE — DATA REVIEWED
[FreeTextEntry1] : Labs 12/8/2020:\par LH 4.4\par FSH 1.6\par TSH 1.93\par Free T4 1.8\par Testosterone 189\par \par Labs 5/2020:\par TSH 3.73\par Free T4 1.7\par TPO Ab neg.\par LFTs normal\par \par Thyroid Ultrasound Report 11/18/19:\par \par Technique: multiple real time longitudinal and transverse images were obtained using a high resolution ultrasound with a linear transducer, Xangati e 2008 model, 10-12 MHz frequencies. All measurements will be reported as longitudinal x scott-posterior x transverse. \par Comparison: Report dated 9/2018. \par \par Indication: multinodular goiter. \par \par Findings: \par The right thyroid lobe measures 3.94 x 1.34 x 1.64 cm. The left thyroid lobe measures 3.51 x 1.44 x 1.66 cm. The isthmus measures 0.26 cm. \par The right thyroid lobe has a heterogeneous parenchyma. \par  \par In the right lower pole there is a  predominantly solid, with isoechoic solid component. It measures 0.92 x 0.95 x 0.92 cm. The nodule is round in shape and the border is smooth. The nodule has a halo. It demonstrates macrocalcifications. It has no vascularity. \par \par In the left lower pole there is a  mixed, predominantly solid, with isoechoic solid component. It measures 0.72 x 0.78 x 0.51 cm. The nodule is round in shape and the border is smooth. The nodule has a halo. It demonstrates macrocalcifications. It has peripheral and scant internal vascularity. \par \par In the right mid pole there is a  predominantly solid. It measures 0.91 x 0.76 x 0.89 cm. The nodule is ovoid in shape and the border is smooth. The nodule has a halo. It demonstrates no calcifications. It has no vascularity. \par \par In the right mid pole there is a  mixed. It measures 0.74 x 0.58 x 0.74 cm. The nodule is round in shape and the border is smooth. The nodule has a halo. It has peripheral vascularity. \par \par Labs 11/2019:\par CBC normal\par CMP normal except glucose of 68 and mildly elevated AST ALT\par \par HDL 49\par Chol 209\par Trig 166\par \par Labs 7/15/19:\par A1c 5.8%\par CMP normal\par TSH 3.46\par Free T4 1.8\par Testosterone 198\par LH 4.0\par FSH 1.5\par \par Labs 1/2019:\par TSH 4.18\par Free T4 1.8\par A1c 5.7%\par CMP normal\par Vit D 32\par \par Labs 11/28/18:\par Cr 1.35\par CMP normal\par \par HDL 47\par Chol 193\par Trig 114\par \par Thyroid Ultrasound Report 9/18/18:\par \par Technique: multiple real time longitudinal and transverse images were obtained using a high resolution ultrasound with a linear transducer, Xangati e 2008 model, 10-12 MHz frequencies. All measurements will be reported as longitudinal x scott-posterior x transverse. \par Comparison: Report dated 10/2017. \par \par Indication: multinodular goiter. \par \par Findings: \par The right thyroid lobe measures 3.23 x 1.98 x 1.40 cm. The left thyroid lobe measures 3.51 x 1.44 x 1.66 cm. The isthmus measures 0.31 cm. \par The right thyroid lobe has a heterogeneous parenchyma. \par The left thyroid lobe has a heterogeneous parenchyma . \par  \par In the right lower pole there is a  predominantly solid, with isoechoic solid component. It measures 1.06 x 0.83 x 0.94 cm. The nodule is round in shape and the border is smooth. The nodule has a halo. It demonstrates macrocalcifications. It has peripheral and scant internal vascularity. \par \par In the left lower pole there is a  mixed, predominantly solid, with isoechoic solid component. It measures 1.05 x 0.74 x 0.84 cm. The nodule is round in shape and the border is smooth. The nodule has a halo. It demonstrates macrocalcifications. It has peripheral and scant internal vascularity. \par \par In the right mid pole there is a  predominantly solid. It measures 1.11 x 0.70 x 0.81 cm. The nodule is ovoid in shape and the border is smooth. The nodule has a halo. It demonstrates no calcifications. It has peripheral vascularity. \par \par In the right mid pole there is a  mixed. It measures 0.83 x 0.82 x 1.05 cm. The nodule is round in shape and the border is smooth. The nodule has a halo. It has peripheral vascularity. \par \par \par Labs 4/2018:\par TSH 4.31\par Free T4 1.4\par \par Labs 12/2017\par Hb 17.7\par CMP normal\par \par HDL 44\par Chol 241\par Trig 77\par \par Labs 10/2017:\par Cortisol 8.4\par TSH 4.86\par Free T4 1.4\par ACTH 18\par Prolactin 14.7\par FSH <0.1\par LH < 0.1\par IGF-1 150\par Testosterone 64.4\par \par \par Thyroid Ultrasound Report 10/2017\par \par Technique: multiple real time longitudinal and transverse images were obtained using a high resolution ultrasound with a linear transducer, Xangati e 2008 model, 10-12 MHz frequencies. All measurements will be reported as longitudinal x scott-posterior x transverse. \par Comparison: Report dated 10/2016. \par \par Indication: multinodular goiter. \par \par Findings: \par The right thyroid lobe measures 2.53 x 1.38 x 1.34 cm. The left thyroid lobe measures 3.41 x 1.18 x 0.99 cm. The isthmus measures 0.26 cm. \par The right thyroid lobe has a heterogeneous parenchyma. \par The left thyroid lobe has a heterogeneous parenchyma . \par  \par In the right lower pole there is a  predominantly solid, with isoechoic solid component. It measures 1.45 x 0.87 x 0.87 cm. The nodule is round in shape and the border is smooth. The nodule has a halo. It demonstrates macrocalcifications. It has peripheral and scant internal vascularity. \par \par In the left lower pole there is a  mixed, predominantly solid, with isoechoic solid component. It measures 0.66 x 0.75 x 0.85 cm. The nodule is round in shape and the border is smooth. The nodule has a halo. It demonstrates macrocalcifications. It has peripheral and scant internal vascularity. \par Labs 1/11/17:\par \par HDL 40\par Chol 227\par Trig 98\par Creatinine 1.36\par CMP otherwise normal except AST of 51\par TSH 3.70\par \par Pathology Right Lower Pole Thyroid Nodule 10/2016: Benign\par \par Thyroid Ultrasound Report 10/18/16:\par \par Technique: multiple real time longitudinal and transverse images were obtained using a high resolution ultrasound with a linear transducer, Xangati e 2008 model, 10-12 MHz frequencies. All measurements will be reported as longitudinal x scott-posterior x transverse. \par Comparison: Report dated 4/2016. \par \par Indication: multinodular goiter. \par \par Findings: \par The right thyroid lobe measures 2.61 x 1.90 x 1.54 cm. The left thyroid lobe measures 3.1 x 1.3 x 1.26 cm. The isthmus measures 0.29 cm. \par The right thyroid lobe has a heterogeneous parenchyma. \par The left thyroid lobe has a heterogeneous parenchyma . \par  \par In the right lower pole there is a  predominantly solid, with isoechoic solid component. It measures 1.56 x 0.9 x 1.2 cm. The nodule is round in shape and the border is smooth. The nodule has a halo. It demonstrates macrocalcifications. It has peripheral and scant internal vascularity. \par \par In the left lower pole there is a  mixed, predominantly solid, with isoechoic solid component. It measures 1.05 x 0.97 x 1.01 cm. The nodule is round in shape and the border is smooth. The nodule has a halo. It demonstrates macrocalcifications. It has peripheral and scant internal vascularity. \par \par Labs 7/2016:\par TSH 8.45\par Free T4 1.3\par TPO Ab neg.\par \par FNA Right Lower 4/2016: Benign\par \par Labs 3/29/16:\par TSH 7.08\par Free T4 1.4\par Anti-TPO Ab negative\par \par Thyroid Ultrasound Report 3/29/16:\par \par Technique: multiple real time longitudinal and transverse images were obtained using a high resolution ultrasound with a linear transducer, Xangati e 2008 model, 10-12 MHz frequencies. All measurements will be reported as longitudinal x scott-posterior x transverse. \par Comparison: None. \par \par Indication: thyroid nodule. \par \par Findings: \par The right thyroid lobe measures 2.37 x 1.44 x 1.62 cm. The left thyroid lobe measures 2.11 x 1.18 x 1.39 cm. The isthmus measures 0.32 cm. \par The right thyroid lobe has a heterogeneous parenchyma. \par The left thyroid lobe has a heterogeneous parenchyma . \par  \par In the right lower pole there is a  predominantly solid, with isoechoic solid component. It measures 0.87 x 0.89 x 0.94 cm. The nodule is round in shape and the border is smooth. The nodule has a halo. It demonstrates macrocalcifications. It has peripheral and scant internal vascularity. \par \par In the right lower pole there is a  predominantly solid, with isoechoic solid component. It measures 1.05 x 0.97 x 1.01 cm. The nodule is round in shape and the border is smooth. The nodule has a halo. It demonstrates macrocalcifications. It has peripheral and scant internal vascularity. \par

## 2020-12-08 NOTE — PHYSICAL EXAM
[Alert] : alert [Well Nourished] : well nourished [Healthy Appearance] : healthy appearance [No Acute Distress] : no acute distress [Well Developed] : well developed [No Proptosis] : no proptosis [No Respiratory Distress] : no respiratory distress [No Accessory Muscle Use] : no accessory muscle use [Clear to Auscultation] : lungs were clear to auscultation bilaterally [Normal to Percussion] : lungs were normal to percussion [Normal S1, S2] : normal S1 and S2 [Normal Rate] : heart rate was normal [Regular Rhythm] : with a regular rhythm [No Edema] : no peripheral edema [Normal Bowel Sounds] : normal bowel sounds [Not Tender] : non-tender [Not Distended] : not distended [Soft] : abdomen soft [No Stigmata of Cushings Syndrome] : no stigmata of Cushings Syndrome [No Clubbing, Cyanosis] : no clubbing  or cyanosis of the fingernails [Normal Strength/Tone] : muscle strength and tone were normal [No Motor Deficits] : the motor exam was normal [No Tremors] : no tremors [Oriented x3] : oriented to person, place, and time [Normal Affect] : the affect was normal [Normal Mood] : the mood was normal [Kyphosis] : no kyphosis present [de-identified] : +palpable scar tissue in the neck; heterogenous thyroid [de-identified] : +left sided posterior palpable scalp lesion

## 2020-12-10 LAB
FSH SERPL-MCNC: 1.6 IU/L
LH SERPL-ACNC: 4.4 IU/L
SHBG SERPL-SCNC: 17 NMOL/L
T4 FREE SERPL-MCNC: 1.8 NG/DL
TESTOST SERPL-MCNC: 189 NG/DL
TSH SERPL-ACNC: 1.93 UIU/ML

## 2020-12-22 ENCOUNTER — NON-APPOINTMENT (OUTPATIENT)
Age: 45
End: 2020-12-22

## 2021-01-21 ENCOUNTER — APPOINTMENT (OUTPATIENT)
Dept: SURGERY | Facility: CLINIC | Age: 46
End: 2021-01-21
Payer: COMMERCIAL

## 2021-01-21 DIAGNOSIS — C06.0 MALIGNANT NEOPLASM OF CHEEK MUCOSA: ICD-10-CM

## 2021-01-21 PROCEDURE — 99213 OFFICE O/P EST LOW 20 MIN: CPT

## 2021-01-21 NOTE — PHYSICAL EXAM
[de-identified] : changes from prior surgery [de-identified] : no palpable thyroid nodules [Laryngoscopy Performed] : laryngoscopy was performed, see procedure section for findings [Midline] : located in midline position [Normal] : orientation to person, place, and time: normal [FreeTextEntry2] : no change in trismus.  left posterior scalp swelling overlying nuchal ridge

## 2021-01-21 NOTE — HISTORY OF PRESENT ILLNESS
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  denies pain, bleeding, dysphagia, hoarseness or new lesions.  . last thyroid sonogram unchanged.   continues Synthroid 125 mcg daily.  no changes medically since last visit. I have reviewed all old and new data and available images.  f/u last CT with neurosurgeon - nothing needs to be done\par

## 2021-01-24 ENCOUNTER — FORM ENCOUNTER (OUTPATIENT)
Age: 46
End: 2021-01-24

## 2021-02-21 ENCOUNTER — TRANSCRIPTION ENCOUNTER (OUTPATIENT)
Age: 46
End: 2021-02-21

## 2021-03-08 ENCOUNTER — TRANSCRIPTION ENCOUNTER (OUTPATIENT)
Age: 46
End: 2021-03-08

## 2021-05-25 ENCOUNTER — FORM ENCOUNTER (OUTPATIENT)
Age: 46
End: 2021-05-25

## 2021-05-26 ENCOUNTER — APPOINTMENT (OUTPATIENT)
Dept: ENDOCRINOLOGY | Facility: CLINIC | Age: 46
End: 2021-05-26
Payer: COMMERCIAL

## 2021-05-26 VITALS
TEMPERATURE: 98 F | BODY MASS INDEX: 33.13 KG/M2 | HEIGHT: 73 IN | OXYGEN SATURATION: 98 % | DIASTOLIC BLOOD PRESSURE: 70 MMHG | SYSTOLIC BLOOD PRESSURE: 138 MMHG | HEART RATE: 62 BPM | WEIGHT: 250 LBS

## 2021-05-26 DIAGNOSIS — L98.9 DISORDER OF THE SKIN AND SUBCUTANEOUS TISSUE, UNSPECIFIED: ICD-10-CM

## 2021-05-26 PROCEDURE — 99214 OFFICE O/P EST MOD 30 MIN: CPT | Mod: 25

## 2021-05-26 PROCEDURE — 76536 US EXAM OF HEAD AND NECK: CPT

## 2021-05-26 RX ORDER — TESTOSTERONE UNDECANOATE 198 MG/1
198 CAPSULE, LIQUID FILLED ORAL
Qty: 60 | Refills: 0 | Status: COMPLETED | COMMUNITY
Start: 2020-12-10 | End: 2021-05-26

## 2021-05-26 NOTE — ASSESSMENT
[FreeTextEntry1] : 44 y/o M w/multinodular goiter on Synthroid for radiation induced hypothyroidism here for follow up.\par \par 1. Multinodular Goiter- Thyroid U/S FNA  performed 10/2016 for increased nodule size- results revealed multinodular goiter. Repeat thyroid US performed 10/2017, 9/2018, 9/2019 with stable nodules (see results section for full description). Repeat thyroid US today stable (see results section for full description). \par \par 2. Hypothyroidism- started on levothyroxine. Now on Synthroid 125 mcg. Will recheck TFTs today. C/w Synthroid 125 mcg will adjust as needed. Clinically euthyroid on exam.\par \par 3. Hypogonadism- s/p HCG with trial of Clomid. Likely due to hx of anabolic steroid abuse in the past vs. radiation induced. Check testosterone, SHBG. Now on testosterone injections 0.5ml every week. Last dose 2 weeks ago. Hold off on checking labs as levels are likely to be low since he's overdue for his dosing. Would prefer to optimize FREDI and weight first. \par \par 4. Scalp Lesion- posterior left sided scalp lesion. Followed up with head and neck with CT evidence of post-op debris. Can continue to monitor.\par \par 5. Hypercalcemia- check calcium with albumin and parathyroid hormone. Vit D level at goal 4/2021.

## 2021-05-26 NOTE — HISTORY OF PRESENT ILLNESS
[FreeTextEntry1] : 46 y/o M diagnosed with thyroid nodules in 2013 which were found incidentally on MRI which was performed for facial pain s/p surgery for squamous cell cancer, also with possible facial nerve damage as he has been experiencing frequent facial spasms unrelieved by Botox. Seen initially by me 3/29/16 with thyroid U/S confirming 2 lower pole right thyroid nodules with microcalcifications largest 1.1cm. s/p FNA 10/2016 with benign findings. Last thyroid US 9/2018. Chemically with subclinical hypothyroidism at that time possibly secondary to radiation exposure from neck cancer so was started on levothyroxine 50 mcg daily. Dose increased to 88 mcg 7/2016. Dose again increased to 100 mcg after TSH found to be mildly elevated at 5.3 (10/2016). Repeat TFTs 1/2017 improved with TSH of 3.7.  TSH 4.86 dose increased to 112 mcg (10/2017) further increased to 125 mcg for TSH of 4.3 (4/2018). Repeat TSH 4.18 (1/2019) and has remained chemically euthyroid since then with last TFTs 5/2020. Takes Synthroid daily in am on empty stomach, eats breakfast 1 hour later. Denies missing doses. Denies current fevers, chills, night sweats, chest pain, SOB, abdominal pain, nausea, vomiting, diarrhea, constipation. Reports chronic dry skin on hands, but no recent changes. Reports heat and cold intolerance - unable to tolerate extremes in weather. Reports chronic dysphagia and chronic neck pain. +occasional palpitations especially at night. Had been exposed to [facility] [place] site. Has a history of radiation to the neck for treatment for squamous cell cancer - last RT August 2016. +weight loss since last visit. \par \par Of note, patient states that he follows with a urologist for history of nephrolithiasis; had his testosterone levels check for complaint of low energy and found to have low level. Underwent MRI head for further evaluation, was told that his pituitary was affected by prior RT, but also has a hx of anabolic steroid use. Was started on HCG injections weekly at the end of March 2017. Was on testosterone injection 200 mg every 2 weeks. Not looking to have children at this time. Has noticed increased mood swings. Recommended trial of Clomid 9/2018 with testosterone level of 230.9. Was taking 25 mg every other day. Had not noticed change in libido. Has been off Clomid since 1/2019 as insurance would not cover it. Also with persistent fatigue and low energy. Treated for sleep apnea with CPAP at night, but occasionally nonadherent. Now started on testosterone injections 0.5ml weekly. Missed this weeks dose. Last dose 2 weeks ago. Feels better on the testosterone injections.

## 2021-05-26 NOTE — PHYSICAL EXAM
[Alert] : alert [Well Nourished] : well nourished [Healthy Appearance] : healthy appearance [No Acute Distress] : no acute distress [Well Developed] : well developed [No Proptosis] : no proptosis [No Respiratory Distress] : no respiratory distress [No Accessory Muscle Use] : no accessory muscle use [Clear to Auscultation] : lungs were clear to auscultation bilaterally [Normal to Percussion] : lungs were normal to percussion [Normal S1, S2] : normal S1 and S2 [Normal Rate] : heart rate was normal [Regular Rhythm] : with a regular rhythm [No Edema] : no peripheral edema [Normal Bowel Sounds] : normal bowel sounds [Not Tender] : non-tender [Not Distended] : not distended [Soft] : abdomen soft [No Stigmata of Cushings Syndrome] : no stigmata of Cushings Syndrome [No Clubbing, Cyanosis] : no clubbing  or cyanosis of the fingernails [Normal Strength/Tone] : muscle strength and tone were normal [No Motor Deficits] : the motor exam was normal [No Tremors] : no tremors [Oriented x3] : oriented to person, place, and time [Normal Affect] : the affect was normal [Normal Mood] : the mood was normal [Kyphosis] : no kyphosis present [de-identified] : +palpable scar tissue in the neck; heterogenous thyroid [de-identified] : +left sided posterior palpable scalp lesion

## 2021-05-26 NOTE — ASSESSMENT
[FreeTextEntry1] : 46 y/o M w/multinodular goiter on Synthroid for radiation induced hypothyroidism here for follow up.\par \par 1. Multinodular Goiter- Thyroid U/S FNA  performed 10/2016 for increased nodule size- results revealed multinodular goiter. Repeat thyroid US performed 10/2017, 9/2018, 9/2019 with stable nodules (see results section for full description). Repeat thyroid US today stable (see results section for full description). \par \par 2. Hypothyroidism- started on levothyroxine. Now on Synthroid 125 mcg. Will recheck TFTs today. C/w Synthroid 125 mcg will adjust as needed. Clinically euthyroid on exam.\par \par 3. Hypogonadism- s/p HCG with trial of Clomid. Likely due to hx of anabolic steroid abuse in the past vs. radiation induced. Check testosterone, SHBG. Now on testosterone injections 0.5ml every week. Last dose 2 weeks ago. Hold off on checking labs as levels are likely to be low since he's overdue for his dosing. Would prefer to optimize FREDI and weight first. \par \par 4. Scalp Lesion- posterior left sided scalp lesion. Followed up with head and neck with CT evidence of post-op debris. Can continue to monitor.\par \par 5. Hypercalcemia- check calcium with albumin and parathyroid hormone. Vit D level at goal 4/2021.

## 2021-05-26 NOTE — PHYSICAL EXAM
[Alert] : alert [Well Nourished] : well nourished [Healthy Appearance] : healthy appearance [No Acute Distress] : no acute distress [Well Developed] : well developed [No Proptosis] : no proptosis [No Respiratory Distress] : no respiratory distress [No Accessory Muscle Use] : no accessory muscle use [Clear to Auscultation] : lungs were clear to auscultation bilaterally [Normal to Percussion] : lungs were normal to percussion [Normal S1, S2] : normal S1 and S2 [Normal Rate] : heart rate was normal [Regular Rhythm] : with a regular rhythm [No Edema] : no peripheral edema [Normal Bowel Sounds] : normal bowel sounds [Not Tender] : non-tender [Not Distended] : not distended [Soft] : abdomen soft [No Stigmata of Cushings Syndrome] : no stigmata of Cushings Syndrome [No Clubbing, Cyanosis] : no clubbing  or cyanosis of the fingernails [Normal Strength/Tone] : muscle strength and tone were normal [No Motor Deficits] : the motor exam was normal [No Tremors] : no tremors [Oriented x3] : oriented to person, place, and time [Normal Affect] : the affect was normal [Normal Mood] : the mood was normal [Kyphosis] : no kyphosis present [de-identified] : +palpable scar tissue in the neck; heterogenous thyroid [de-identified] : +left sided posterior palpable scalp lesion

## 2021-05-26 NOTE — DATA REVIEWED
[FreeTextEntry1] : Thyroid Ultrasound Report 5/26/2021:\par \par Technique: multiple real time longitudinal and transverse images were obtained using a high resolution ultrasound with a linear transducer, [employer] 2008 model, 10-12 MHz frequencies. All measurements will be reported as longitudinal x scott-posterior x transverse. \par Comparison: Report dated 11/2019. \par \par Indication: multinodular goiter. \par \par Findings: \par The right thyroid lobe measures 4.83 x 1.33 x 1.81 cm. The left thyroid lobe measures 3.42 x 1.40 x 1.57 cm. The isthmus measures 0.34 cm. \par The right thyroid lobe has a heterogeneous parenchyma. \par The left thyroid lobe has a heterogeneous parenchyma. \par  \par In the right lower pole there is a  predominantly solid, with isoechoic solid component. It measures 0.93 x 0.99 x 0.86 cm. The nodule is round in shape and the border is smooth. The nodule has a halo. It demonstrates macrocalcifications. It has no vascularity. \par \par In the left lower pole there is a  mixed, predominantly solid, with isoechoic solid component. It measures 0.6 x 0.64 x 0.50 cm. The nodule is round in shape and the border is smooth. The nodule has a halo. It demonstrates macrocalcifications. It has peripheral and scant internal vascularity. \par \par In the right mid pole there is a  predominantly solid. It measures 1.46 x 0.93 x 0.64 cm. The nodule is ovoid in shape and the border is smooth. The nodule has a halo. It demonstrates no calcifications. It has no vascularity. \par \par In the right mid pole there is a  mixed. It measures 0.74 x 1.06 x 0.83 cm. The nodule is round in shape and the border is smooth. The nodule has a halo. It has peripheral vascularity. \par \par Labs 4/2021:\par Phos 2.3\par Ca 10.6\par No albumin checked\par Glucose 66\par BMP otherwise normal\par \par Labs 12/8/2020:\par LH 4.4\par FSH 1.6\par TSH 1.93\par Free T4 1.8\par Testosterone 189\par \par Labs 5/2020:\par TSH 3.73\par Free T4 1.7\par TPO Ab neg.\par LFTs normal\par \par Thyroid Ultrasound Report 11/18/19:\par \par Technique: multiple real time longitudinal and transverse images were obtained using a high resolution ultrasound with a linear transducer, [employer] e 2008 model, 10-12 MHz frequencies. All measurements will be reported as longitudinal x scott-posterior x transverse. \par Comparison: Report dated 9/2018. \par \par Indication: multinodular goiter. \par \par Findings: \par The right thyroid lobe measures 3.94 x 1.34 x 1.64 cm. The left thyroid lobe measures 3.51 x 1.44 x 1.66 cm. The isthmus measures 0.26 cm. \par The right thyroid lobe has a heterogeneous parenchyma. \par  \par In the right lower pole there is a  predominantly solid, with isoechoic solid component. It measures 0.92 x 0.95 x 0.92 cm. The nodule is round in shape and the border is smooth. The nodule has a halo. It demonstrates macrocalcifications. It has no vascularity. \par \par In the left lower pole there is a  mixed, predominantly solid, with isoechoic solid component. It measures 0.72 x 0.78 x 0.51 cm. The nodule is round in shape and the border is smooth. The nodule has a halo. It demonstrates macrocalcifications. It has peripheral and scant internal vascularity. \par \par In the right mid pole there is a  predominantly solid. It measures 0.91 x 0.76 x 0.89 cm. The nodule is ovoid in shape and the border is smooth. The nodule has a halo. It demonstrates no calcifications. It has no vascularity. \par \par In the right mid pole there is a  mixed. It measures 0.74 x 0.58 x 0.74 cm. The nodule is round in shape and the border is smooth. The nodule has a halo. It has peripheral vascularity. \par \par Labs 11/2019:\par CBC normal\par CMP normal except glucose of 68 and mildly elevated AST ALT\par \par HDL 49\par Chol 209\par Trig 166\par \par Labs 7/15/19:\par A1c 5.8%\par CMP normal\par TSH 3.46\par Free T4 1.8\par Testosterone 198\par LH 4.0\par FSH 1.5\par \par Labs 1/2019:\par TSH 4.18\par Free T4 1.8\par A1c 5.7%\par CMP normal\par Vit D 32\par \par Labs 11/28/18:\par Cr 1.35\par CMP normal\par \par HDL 47\par Chol 193\par Trig 114\par \par Thyroid Ultrasound Report 9/18/18:\par \par Technique: multiple real time longitudinal and transverse images were obtained using a high resolution ultrasound with a linear transducer, [employer] 2008 model, 10-12 MHz frequencies. All measurements will be reported as longitudinal x scott-posterior x transverse. \par Comparison: Report dated 10/2017. \par \par Indication: multinodular goiter. \par \par Findings: \par The right thyroid lobe measures 3.23 x 1.98 x 1.40 cm. The left thyroid lobe measures 3.51 x 1.44 x 1.66 cm. The isthmus measures 0.31 cm. \par The right thyroid lobe has a heterogeneous parenchyma. \par The left thyroid lobe has a heterogeneous parenchyma . \par  \par In the right lower pole there is a  predominantly solid, with isoechoic solid component. It measures 1.06 x 0.83 x 0.94 cm. The nodule is round in shape and the border is smooth. The nodule has a halo. It demonstrates macrocalcifications. It has peripheral and scant internal vascularity. \par \par In the left lower pole there is a  mixed, predominantly solid, with isoechoic solid component. It measures 1.05 x 0.74 x 0.84 cm. The nodule is round in shape and the border is smooth. The nodule has a halo. It demonstrates macrocalcifications. It has peripheral and scant internal vascularity. \par \par In the right mid pole there is a  predominantly solid. It measures 1.11 x 0.70 x 0.81 cm. The nodule is ovoid in shape and the border is smooth. The nodule has a halo. It demonstrates no calcifications. It has peripheral vascularity. \par \par In the right mid pole there is a  mixed. It measures 0.83 x 0.82 x 1.05 cm. The nodule is round in shape and the border is smooth. The nodule has a halo. It has peripheral vascularity. \par \par \par Labs 4/2018:\par TSH 4.31\par Free T4 1.4\par \par Labs 12/2017\par Hb 17.7\par CMP normal\par \par HDL 44\par Chol 241\par Trig 77\par \par Labs 10/2017:\par Cortisol 8.4\par TSH 4.86\par Free T4 1.4\par ACTH 18\par Prolactin 14.7\par FSH <0.1\par LH < 0.1\par IGF-1 150\par Testosterone 64.4\par \par \par Thyroid Ultrasound Report 10/2017\par \par Technique: multiple real time longitudinal and transverse images were obtained using a high resolution ultrasound with a linear transducer, [employer] 2008 model, 10-12 MHz frequencies. All measurements will be reported as longitudinal x scott-posterior x transverse. \par Comparison: Report dated 10/2016. \par \par Indication: multinodular goiter. \par \par Findings: \par The right thyroid lobe measures 2.53 x 1.38 x 1.34 cm. The left thyroid lobe measures 3.41 x 1.18 x 0.99 cm. The isthmus measures 0.26 cm. \par The right thyroid lobe has a heterogeneous parenchyma. \par The left thyroid lobe has a heterogeneous parenchyma . \par  \par In the right lower pole there is a  predominantly solid, with isoechoic solid component. It measures 1.45 x 0.87 x 0.87 cm. The nodule is round in shape and the border is smooth. The nodule has a halo. It demonstrates macrocalcifications. It has peripheral and scant internal vascularity. \par \par In the left lower pole there is a  mixed, predominantly solid, with isoechoic solid component. It measures 0.66 x 0.75 x 0.85 cm. The nodule is round in shape and the border is smooth. The nodule has a halo. It demonstrates macrocalcifications. It has peripheral and scant internal vascularity. \par Labs 1/11/17:\par \par HDL 40\par Chol 227\par Trig 98\par Creatinine 1.36\par CMP otherwise normal except AST of 51\par TSH 3.70\par \par Pathology Right Lower Pole Thyroid Nodule 10/2016: Benign\par \par Thyroid Ultrasound Report 10/18/16:\par \par Technique: multiple real time longitudinal and transverse images were obtained using a high resolution ultrasound with a linear transducer, [employer] 2008 model, 10-12 MHz frequencies. All measurements will be reported as longitudinal x scott-posterior x transverse. \par Comparison: Report dated 4/2016. \par \par Indication: multinodular goiter. \par \par Findings: \par The right thyroid lobe measures 2.61 x 1.90 x 1.54 cm. The left thyroid lobe measures 3.1 x 1.3 x 1.26 cm. The isthmus measures 0.29 cm. \par The right thyroid lobe has a heterogeneous parenchyma. \par The left thyroid lobe has a heterogeneous parenchyma . \par  \par In the right lower pole there is a  predominantly solid, with isoechoic solid component. It measures 1.56 x 0.9 x 1.2 cm. The nodule is round in shape and the border is smooth. The nodule has a halo. It demonstrates macrocalcifications. It has peripheral and scant internal vascularity. \par \par In the left lower pole there is a  mixed, predominantly solid, with isoechoic solid component. It measures 1.05 x 0.97 x 1.01 cm. The nodule is round in shape and the border is smooth. The nodule has a halo. It demonstrates macrocalcifications. It has peripheral and scant internal vascularity. \par \par Labs 7/2016:\par TSH 8.45\par Free T4 1.3\par TPO Ab neg.\par \par FNA Right Lower 4/2016: Benign\par \par Labs 3/29/16:\par TSH 7.08\par Free T4 1.4\par Anti-TPO Ab negative\par \par Thyroid Ultrasound Report 3/29/16:\par \par Technique: multiple real time longitudinal and transverse images were obtained using a high resolution ultrasound with a linear transducer, [employer] 2008 model, 10-12 MHz frequencies. All measurements will be reported as longitudinal x scott-posterior x transverse. \par Comparison: None. \par \par Indication: thyroid nodule. \par \par Findings: \par The right thyroid lobe measures 2.37 x 1.44 x 1.62 cm. The left thyroid lobe measures 2.11 x 1.18 x 1.39 cm. The isthmus measures 0.32 cm. \par The right thyroid lobe has a heterogeneous parenchyma. \par The left thyroid lobe has a heterogeneous parenchyma . \par  \par In the right lower pole there is a  predominantly solid, with isoechoic solid component. It measures 0.87 x 0.89 x 0.94 cm. The nodule is round in shape and the border is smooth. The nodule has a halo. It demonstrates macrocalcifications. It has peripheral and scant internal vascularity. \par \par In the right lower pole there is a  predominantly solid, with isoechoic solid component. It measures 1.05 x 0.97 x 1.01 cm. The nodule is round in shape and the border is smooth. The nodule has a halo. It demonstrates macrocalcifications. It has peripheral and scant internal vascularity. \par

## 2021-05-26 NOTE — DATA REVIEWED
[FreeTextEntry1] : Thyroid Ultrasound Report 5/26/2021:\par \par Technique: multiple real time longitudinal and transverse images were obtained using a high resolution ultrasound with a linear transducer, [employer] e 2008 model, 10-12 MHz frequencies. All measurements will be reported as longitudinal x scott-posterior x transverse. \par Comparison: Report dated 11/2019. \par \par Indication: multinodular goiter. \par \par Findings: \par The right thyroid lobe measures 4.83 x 1.33 x 1.81 cm. The left thyroid lobe measures 3.42 x 1.40 x 1.57 cm. The isthmus measures 0.34 cm. \par The right thyroid lobe has a heterogeneous parenchyma. \par The left thyroid lobe has a heterogeneous parenchyma. \par  \par In the right lower pole there is a  predominantly solid, with isoechoic solid component. It measures 0.93 x 0.99 x 0.86 cm. The nodule is round in shape and the border is smooth. The nodule has a halo. It demonstrates macrocalcifications. It has no vascularity. \par \par In the left lower pole there is a  mixed, predominantly solid, with isoechoic solid component. It measures 0.6 x 0.64 x 0.50 cm. The nodule is round in shape and the border is smooth. The nodule has a halo. It demonstrates macrocalcifications. It has peripheral and scant internal vascularity. \par \par In the right mid pole there is a  predominantly solid. It measures 1.46 x 0.93 x 0.64 cm. The nodule is ovoid in shape and the border is smooth. The nodule has a halo. It demonstrates no calcifications. It has no vascularity. \par \par In the right mid pole there is a  mixed. It measures 0.74 x 1.06 x 0.83 cm. The nodule is round in shape and the border is smooth. The nodule has a halo. It has peripheral vascularity. \par \par Labs 4/2021:\par Phos 2.3\par Ca 10.6\par No albumin checked\par Glucose 66\par BMP otherwise normal\par \par Labs 12/8/2020:\par LH 4.4\par FSH 1.6\par TSH 1.93\par Free T4 1.8\par Testosterone 189\par \par Labs 5/2020:\par TSH 3.73\par Free T4 1.7\par TPO Ab neg.\par LFTs normal\par \par Thyroid Ultrasound Report 11/18/19:\par \par Technique: multiple real time longitudinal and transverse images were obtained using a high resolution ultrasound with a linear transducer, [employer] e 2008 model, 10-12 MHz frequencies. All measurements will be reported as longitudinal x scott-posterior x transverse. \par Comparison: Report dated 9/2018. \par \par Indication: multinodular goiter. \par \par Findings: \par The right thyroid lobe measures 3.94 x 1.34 x 1.64 cm. The left thyroid lobe measures 3.51 x 1.44 x 1.66 cm. The isthmus measures 0.26 cm. \par The right thyroid lobe has a heterogeneous parenchyma. \par  \par In the right lower pole there is a  predominantly solid, with isoechoic solid component. It measures 0.92 x 0.95 x 0.92 cm. The nodule is round in shape and the border is smooth. The nodule has a halo. It demonstrates macrocalcifications. It has no vascularity. \par \par In the left lower pole there is a  mixed, predominantly solid, with isoechoic solid component. It measures 0.72 x 0.78 x 0.51 cm. The nodule is round in shape and the border is smooth. The nodule has a halo. It demonstrates macrocalcifications. It has peripheral and scant internal vascularity. \par \par In the right mid pole there is a  predominantly solid. It measures 0.91 x 0.76 x 0.89 cm. The nodule is ovoid in shape and the border is smooth. The nodule has a halo. It demonstrates no calcifications. It has no vascularity. \par \par In the right mid pole there is a  mixed. It measures 0.74 x 0.58 x 0.74 cm. The nodule is round in shape and the border is smooth. The nodule has a halo. It has peripheral vascularity. \par \par Labs 11/2019:\par CBC normal\par CMP normal except glucose of 68 and mildly elevated AST ALT\par \par HDL 49\par Chol 209\par Trig 166\par \par Labs 7/15/19:\par A1c 5.8%\par CMP normal\par TSH 3.46\par Free T4 1.8\par Testosterone 198\par LH 4.0\par FSH 1.5\par \par Labs 1/2019:\par TSH 4.18\par Free T4 1.8\par A1c 5.7%\par CMP normal\par Vit D 32\par \par Labs 11/28/18:\par Cr 1.35\par CMP normal\par \par HDL 47\par Chol 193\par Trig 114\par \par Thyroid Ultrasound Report 9/18/18:\par \par Technique: multiple real time longitudinal and transverse images were obtained using a high resolution ultrasound with a linear transducer, [employer] e 2008 model, 10-12 MHz frequencies. All measurements will be reported as longitudinal x scott-posterior x transverse. \par Comparison: Report dated 10/2017. \par \par Indication: multinodular goiter. \par \par Findings: \par The right thyroid lobe measures 3.23 x 1.98 x 1.40 cm. The left thyroid lobe measures 3.51 x 1.44 x 1.66 cm. The isthmus measures 0.31 cm. \par The right thyroid lobe has a heterogeneous parenchyma. \par The left thyroid lobe has a heterogeneous parenchyma . \par  \par In the right lower pole there is a  predominantly solid, with isoechoic solid component. It measures 1.06 x 0.83 x 0.94 cm. The nodule is round in shape and the border is smooth. The nodule has a halo. It demonstrates macrocalcifications. It has peripheral and scant internal vascularity. \par \par In the left lower pole there is a  mixed, predominantly solid, with isoechoic solid component. It measures 1.05 x 0.74 x 0.84 cm. The nodule is round in shape and the border is smooth. The nodule has a halo. It demonstrates macrocalcifications. It has peripheral and scant internal vascularity. \par \par In the right mid pole there is a  predominantly solid. It measures 1.11 x 0.70 x 0.81 cm. The nodule is ovoid in shape and the border is smooth. The nodule has a halo. It demonstrates no calcifications. It has peripheral vascularity. \par \par In the right mid pole there is a  mixed. It measures 0.83 x 0.82 x 1.05 cm. The nodule is round in shape and the border is smooth. The nodule has a halo. It has peripheral vascularity. \par \par \par Labs 4/2018:\par TSH 4.31\par Free T4 1.4\par \par Labs 12/2017\par Hb 17.7\par CMP normal\par \par HDL 44\par Chol 241\par Trig 77\par \par Labs 10/2017:\par Cortisol 8.4\par TSH 4.86\par Free T4 1.4\par ACTH 18\par Prolactin 14.7\par FSH <0.1\par LH < 0.1\par IGF-1 150\par Testosterone 64.4\par \par \par Thyroid Ultrasound Report 10/2017\par \par Technique: multiple real time longitudinal and transverse images were obtained using a high resolution ultrasound with a linear transducer, [employer] e 2008 model, 10-12 MHz frequencies. All measurements will be reported as longitudinal x scott-posterior x transverse. \par Comparison: Report dated 10/2016. \par \par Indication: multinodular goiter. \par \par Findings: \par The right thyroid lobe measures 2.53 x 1.38 x 1.34 cm. The left thyroid lobe measures 3.41 x 1.18 x 0.99 cm. The isthmus measures 0.26 cm. \par The right thyroid lobe has a heterogeneous parenchyma. \par The left thyroid lobe has a heterogeneous parenchyma . \par  \par In the right lower pole there is a  predominantly solid, with isoechoic solid component. It measures 1.45 x 0.87 x 0.87 cm. The nodule is round in shape and the border is smooth. The nodule has a halo. It demonstrates macrocalcifications. It has peripheral and scant internal vascularity. \par \par In the left lower pole there is a  mixed, predominantly solid, with isoechoic solid component. It measures 0.66 x 0.75 x 0.85 cm. The nodule is round in shape and the border is smooth. The nodule has a halo. It demonstrates macrocalcifications. It has peripheral and scant internal vascularity. \par Labs 1/11/17:\par \par HDL 40\par Chol 227\par Trig 98\par Creatinine 1.36\par CMP otherwise normal except AST of 51\par TSH 3.70\par \par Pathology Right Lower Pole Thyroid Nodule 10/2016: Benign\par \par Thyroid Ultrasound Report 10/18/16:\par \par Technique: multiple real time longitudinal and transverse images were obtained using a high resolution ultrasound with a linear transducer, [employer] e 2008 model, 10-12 MHz frequencies. All measurements will be reported as longitudinal x scott-posterior x transverse. \par Comparison: Report dated 4/2016. \par \par Indication: multinodular goiter. \par \par Findings: \par The right thyroid lobe measures 2.61 x 1.90 x 1.54 cm. The left thyroid lobe measures 3.1 x 1.3 x 1.26 cm. The isthmus measures 0.29 cm. \par The right thyroid lobe has a heterogeneous parenchyma. \par The left thyroid lobe has a heterogeneous parenchyma . \par  \par In the right lower pole there is a  predominantly solid, with isoechoic solid component. It measures 1.56 x 0.9 x 1.2 cm. The nodule is round in shape and the border is smooth. The nodule has a halo. It demonstrates macrocalcifications. It has peripheral and scant internal vascularity. \par \par In the left lower pole there is a  mixed, predominantly solid, with isoechoic solid component. It measures 1.05 x 0.97 x 1.01 cm. The nodule is round in shape and the border is smooth. The nodule has a halo. It demonstrates macrocalcifications. It has peripheral and scant internal vascularity. \par \par Labs 7/2016:\par TSH 8.45\par Free T4 1.3\par TPO Ab neg.\par \par FNA Right Lower 4/2016: Benign\par \par Labs 3/29/16:\par TSH 7.08\par Free T4 1.4\par Anti-TPO Ab negative\par \par Thyroid Ultrasound Report 3/29/16:\par \par Technique: multiple real time longitudinal and transverse images were obtained using a high resolution ultrasound with a linear transducer, [employer] e 2008 model, 10-12 MHz frequencies. All measurements will be reported as longitudinal x scott-posterior x transverse. \par Comparison: None. \par \par Indication: thyroid nodule. \par \par Findings: \par The right thyroid lobe measures 2.37 x 1.44 x 1.62 cm. The left thyroid lobe measures 2.11 x 1.18 x 1.39 cm. The isthmus measures 0.32 cm. \par The right thyroid lobe has a heterogeneous parenchyma. \par The left thyroid lobe has a heterogeneous parenchyma . \par  \par In the right lower pole there is a  predominantly solid, with isoechoic solid component. It measures 0.87 x 0.89 x 0.94 cm. The nodule is round in shape and the border is smooth. The nodule has a halo. It demonstrates macrocalcifications. It has peripheral and scant internal vascularity. \par \par In the right lower pole there is a  predominantly solid, with isoechoic solid component. It measures 1.05 x 0.97 x 1.01 cm. The nodule is round in shape and the border is smooth. The nodule has a halo. It demonstrates macrocalcifications. It has peripheral and scant internal vascularity. \par

## 2021-05-26 NOTE — PROCEDURE
[[employer] 2008 model, 10-12 MHz frequencies] : multiple real time longitudinal and transverse images were obtained using a high resolution ultrasound with a linear transducer, [employer] e 2008 model, 10-12 MHz frequencies. All measurements will be reported as longitudinal x scott-posterior x transverse. [Report dated ___] : Report dated [unfilled] [Multinodular Goiter] : multinodular goiter [] : a heterogeneous parenchyma [Left Thyroid] : left [Lower] : lower pole there is a  [Isoechoic solid component] : with isoechoic solid component [Macrocalcifications] : macrocalcifications [Peripheral and scant  internal vascularity] : peripheral and scant internal vascularity [Predominantly Solid] : predominantly solid [Ovoid] : ovoid in shape [No calcifications] : no calcifications [No vascularity] : no vascularity [Right Thyroid] : right [Mid] : mid pole there is a  [Mixed] : mixed [Round] : round in shape [Smooth] : smooth [Yes] : has a halo [Peripheral vascularity] : peripheral vascularity [FreeTextEntry1] : 4.83 x 1.33 x 1.81 [FreeTextEntry5] : 3.42 x 1.40 x 1.57 [FreeTextEntry2] : 0.34 [FreeTextEntry3] : 0.74 x 1.06 x 0.83

## 2021-05-26 NOTE — IMPRESSION
[FreeTextEntry1] : Heterogenous gland, nonenlarged with stable solid thyroid nodules as above.  [FreeTextEntry2] : Check thyroid function tests today \par Repeat Thyroid US in 12-24 months

## 2021-05-26 NOTE — HISTORY OF PRESENT ILLNESS
[FreeTextEntry1] : 44 y/o M diagnosed with thyroid nodules in 2013 which were found incidentally on MRI which was performed for facial pain s/p surgery for squamous cell cancer, also with possible facial nerve damage as he has been experiencing frequent facial spasms unrelieved by Botox. Seen initially by me 3/29/16 with thyroid U/S confirming 2 lower pole right thyroid nodules with microcalcifications largest 1.1cm. s/p FNA 10/2016 with benign findings. Last thyroid US 9/2018. Chemically with subclinical hypothyroidism at that time possibly secondary to radiation exposure from neck cancer so was started on levothyroxine 50 mcg daily. Dose increased to 88 mcg 7/2016. Dose again increased to 100 mcg after TSH found to be mildly elevated at 5.3 (10/2016). Repeat TFTs 1/2017 improved with TSH of 3.7.  TSH 4.86 dose increased to 112 mcg (10/2017) further increased to 125 mcg for TSH of 4.3 (4/2018). Repeat TSH 4.18 (1/2019) and has remained chemically euthyroid since then with last TFTs 5/2020. Takes Synthroid daily in am on empty stomach, eats breakfast 1 hour later. Denies missing doses. Denies current fevers, chills, night sweats, chest pain, SOB, abdominal pain, nausea, vomiting, diarrhea, constipation. Reports chronic dry skin on hands, but no recent changes. Reports heat and cold intolerance - unable to tolerate extremes in weather. Reports chronic dysphagia and chronic neck pain. +occasional palpitations especially at night. Had been exposed to [facility] site. Has a history of radiation to the neck for treatment for squamous cell cancer - last RT August 2016. +weight loss since last visit. \par \par Of note, patient states that he follows with a urologist for history of nephrolithiasis; had his testosterone levels check for complaint of low energy and found to have low level. Underwent MRI head for further evaluation, was told that his pituitary was affected by prior RT, but also has a hx of anabolic steroid use. Was started on HCG injections weekly at the end of March 2017. Was on testosterone injection 200 mg every 2 weeks. Not looking to have children at this time. Has noticed increased mood swings. Recommended trial of Clomid 9/2018 with testosterone level of 230.9. Was taking 25 mg every other day. Had not noticed change in libido. Has been off Clomid since 1/2019 as insurance would not cover it. Also with persistent fatigue and low energy. Treated for sleep apnea with CPAP at night, but occasionally nonadherent. Now started on testosterone injections 0.5ml weekly. Missed this weeks dose. Last dose 2 weeks ago. Feels better on the testosterone injections.

## 2021-05-26 NOTE — PROCEDURE
[[employer] e 2008 model, 10-12 MHz frequencies] : multiple real time longitudinal and transverse images were obtained using a high resolution ultrasound with a linear transducer, [employer] e 2008 model, 10-12 MHz frequencies. All measurements will be reported as longitudinal x scott-posterior x transverse. [Report dated ___] : Report dated [unfilled] [Multinodular Goiter] : multinodular goiter [] : a heterogeneous parenchyma [Left Thyroid] : left [Lower] : lower pole there is a  [Isoechoic solid component] : with isoechoic solid component [Macrocalcifications] : macrocalcifications [Peripheral and scant  internal vascularity] : peripheral and scant internal vascularity [Predominantly Solid] : predominantly solid [Ovoid] : ovoid in shape [No calcifications] : no calcifications [No vascularity] : no vascularity [Right Thyroid] : right [Mid] : mid pole there is a  [Mixed] : mixed [Round] : round in shape [Smooth] : smooth [Yes] : has a halo [Peripheral vascularity] : peripheral vascularity [FreeTextEntry1] : 4.83 x 1.33 x 1.81 [FreeTextEntry5] : 3.42 x 1.40 x 1.57 [FreeTextEntry2] : 0.34 [FreeTextEntry3] : 0.74 x 1.06 x 0.83

## 2021-06-02 ENCOUNTER — APPOINTMENT (OUTPATIENT)
Dept: ENDOCRINOLOGY | Facility: CLINIC | Age: 46
End: 2021-06-02

## 2021-06-02 LAB
ALBUMIN SERPL ELPH-MCNC: 4.7 G/DL
ALP BLD-CCNC: 84 U/L
ALT SERPL-CCNC: 24 U/L
ANION GAP SERPL CALC-SCNC: 17 MMOL/L
AST SERPL-CCNC: 25 U/L
BILIRUB SERPL-MCNC: 0.4 MG/DL
BUN SERPL-MCNC: 19 MG/DL
CALCIUM SERPL-MCNC: 9.8 MG/DL
CALCIUM SERPL-MCNC: 9.8 MG/DL
CHLORIDE SERPL-SCNC: 100 MMOL/L
CHOLEST SERPL-MCNC: 205 MG/DL
CO2 SERPL-SCNC: 21 MMOL/L
CREAT SERPL-MCNC: 1.2 MG/DL
ESTIMATED AVERAGE GLUCOSE: 108 MG/DL
GLUCOSE SERPL-MCNC: 70 MG/DL
HBA1C MFR BLD HPLC: 5.4 %
HDLC SERPL-MCNC: 42 MG/DL
LDLC SERPL CALC-MCNC: 131 MG/DL
NONHDLC SERPL-MCNC: 163 MG/DL
PARATHYROID HORMONE INTACT: 26 PG/ML
POTASSIUM SERPL-SCNC: 4.6 MMOL/L
PROT SERPL-MCNC: 6.9 G/DL
SODIUM SERPL-SCNC: 138 MMOL/L
T4 FREE SERPL-MCNC: 2 NG/DL
TRIGL SERPL-MCNC: 160 MG/DL
TSH SERPL-ACNC: 3.57 UIU/ML

## 2021-06-10 ENCOUNTER — APPOINTMENT (OUTPATIENT)
Dept: SURGERY | Facility: CLINIC | Age: 46
End: 2021-06-10
Payer: COMMERCIAL

## 2021-06-10 PROCEDURE — 99214 OFFICE O/P EST MOD 30 MIN: CPT

## 2021-06-10 NOTE — HISTORY OF PRESENT ILLNESS
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  denies pain, bleeding, dysphagia, hoarseness or new lesions.  . last thyroid sonogram unchanged.   continues Synthroid 125 mcg daily.  no changes medically since last visit. I have reviewed all old and new data and available images.  f/u last CT with neurosurgeon - nothing needs to be done\par

## 2021-06-10 NOTE — ASSESSMENT
[FreeTextEntry1] : will observe. to return earlier if any change.  patient has been given the opportunity to ask questions, and all of the patient's questions have been answered to their satisfaction.  moving to [place].  to see all [place] physicians in November

## 2021-06-10 NOTE — PHYSICAL EXAM
[de-identified] : changes from prior surgery [de-identified] : no palpable thyroid nodules [Laryngoscopy Performed] : laryngoscopy was performed, see procedure section for findings [Midline] : located in midline position [Normal] : orientation to person, place, and time: normal [FreeTextEntry2] : no change in trismus.  left posterior scalp swelling overlying nuchal ridge

## 2021-10-03 ENCOUNTER — FORM ENCOUNTER (OUTPATIENT)
Age: 46
End: 2021-10-03

## 2021-11-10 ENCOUNTER — APPOINTMENT (OUTPATIENT)
Dept: ENDOCRINOLOGY | Facility: CLINIC | Age: 46
End: 2021-11-10
Payer: COMMERCIAL

## 2021-11-10 VITALS
HEIGHT: 73 IN | TEMPERATURE: 97.5 F | SYSTOLIC BLOOD PRESSURE: 118 MMHG | HEART RATE: 85 BPM | WEIGHT: 250 LBS | OXYGEN SATURATION: 95 % | DIASTOLIC BLOOD PRESSURE: 69 MMHG | BODY MASS INDEX: 33.13 KG/M2

## 2021-11-10 PROCEDURE — 99214 OFFICE O/P EST MOD 30 MIN: CPT

## 2021-11-10 NOTE — ASSESSMENT
[FreeTextEntry1] : 45 y/o M w/multinodular goiter on Synthroid for radiation induced hypothyroidism and hypogonadism here for follow up.\par \par 1. Multinodular Goiter- Thyroid U/S FNA  performed 10/2016 for increased nodule size- results revealed multinodular goiter. Repeat thyroid US performed 10/2017, 9/2018, 9/2019, 5/2021 with stable nodules (see results section for full description). Repeat thyroid US 5/2023. \par \par 2. Hypothyroidism- started on levothyroxine. Now on Synthroid 125 mcg. Clinically and chemically euthyroid. C/w Synthroid 125 mcg will adjust as needed. \par \par 3. Hypogonadism- s/p HCG with trial of Clomid. Likely due to hx of anabolic steroid abuse in the past vs. radiation induced. Now on testosterone injections 0.5ml every week. Last dose 5 days. Consider Jatenzo for improved adherence and stability in levels. \par \par 5. Hypercalcemia- resolved on last lab work.

## 2021-11-10 NOTE — PROCEDURE
[[employer] e 2008 model, 10-12 MHz frequencies] : multiple real time longitudinal and transverse images were obtained using a high resolution ultrasound with a linear transducer, [employer] e 2008 model, 10-12 MHz frequencies. All measurements will be reported as longitudinal x scott-posterior x transverse. [Report dated ___] : Report dated [unfilled] [Multinodular Goiter] : multinodular goiter [] : a heterogeneous parenchyma  [Left Thyroid] : left [Lower] : lower pole there is a  [Isoechoic solid component] : with isoechoic solid component [Macrocalcifications] : macrocalcifications [Peripheral and scant  internal vascularity] : peripheral and scant internal vascularity [Predominantly Solid] : predominantly solid [Ovoid] : ovoid in shape [No calcifications] : no calcifications [No vascularity] : no vascularity [Right Thyroid] : right [Mid] : mid pole there is a  [Mixed] : mixed [Round] : round in shape [Smooth] : smooth [Yes] : has a halo [Peripheral vascularity] : peripheral vascularity [FreeTextEntry1] : 4.83 x 1.33 x 1.81 [FreeTextEntry5] : 3.42 x 1.40 x 1.57 [FreeTextEntry2] : 0.34 [FreeTextEntry3] : 0.74 x 1.06 x 0.83

## 2021-11-10 NOTE — DATA REVIEWED
[FreeTextEntry1] : Labs 11/9/2021:\par TSH 2.6\par \par Labs 11/5/2021:\par Hb 15.7\par HCT 47.9\par \par Labs 10/2021:\par Hb 16.8\par HCT 51.8\par Testosterone 94\par CMP normal\par \par Thyroid Ultrasound Report 5/26/2021:\par \par Technique: multiple real time longitudinal and transverse images were obtained using a high resolution ultrasound with a linear transducer, [employer] e 2008 model, 10-12 MHz frequencies. All measurements will be reported as longitudinal x scott-posterior x transverse. \par Comparison: Report dated 11/2019. \par \par Indication: multinodular goiter. \par \par Findings: \par The right thyroid lobe measures 4.83 x 1.33 x 1.81 cm. The left thyroid lobe measures 3.42 x 1.40 x 1.57 cm. The isthmus measures 0.34 cm. \par The right thyroid lobe has a heterogeneous parenchyma. \par The left thyroid lobe has a heterogeneous parenchyma. \par  \par In the right lower pole there is a  predominantly solid, with isoechoic solid component. It measures 0.93 x 0.99 x 0.86 cm. The nodule is round in shape and the border is smooth. The nodule has a halo. It demonstrates macrocalcifications. It has no vascularity. \par \par In the left lower pole there is a  mixed, predominantly solid, with isoechoic solid component. It measures 0.6 x 0.64 x 0.50 cm. The nodule is round in shape and the border is smooth. The nodule has a halo. It demonstrates macrocalcifications. It has peripheral and scant internal vascularity. \par \par In the right mid pole there is a  predominantly solid. It measures 1.46 x 0.93 x 0.64 cm. The nodule is ovoid in shape and the border is smooth. The nodule has a halo. It demonstrates no calcifications. It has no vascularity. \par \par In the right mid pole there is a  mixed. It measures 0.74 x 1.06 x 0.83 cm. The nodule is round in shape and the border is smooth. The nodule has a halo. It has peripheral vascularity. \par \par Labs 4/2021:\par Phos 2.3\par Ca 10.6\par No albumin checked\par Glucose 66\par BMP otherwise normal\par \par Labs 12/8/2020:\par LH 4.4\par FSH 1.6\par TSH 1.93\par Free T4 1.8\par Testosterone 189\par \par Labs 5/2020:\par TSH 3.73\par Free T4 1.7\par TPO Ab neg.\par LFTs normal\par \par Thyroid Ultrasound Report 11/18/19:\par \par Technique: multiple real time longitudinal and transverse images were obtained using a high resolution ultrasound with a linear transducer, [employer] e 2008 model, 10-12 MHz frequencies. All measurements will be reported as longitudinal x scott-posterior x transverse. \par Comparison: Report dated 9/2018. \par \par Indication: multinodular goiter. \par \par Findings: \par The right thyroid lobe measures 3.94 x 1.34 x 1.64 cm. The left thyroid lobe measures 3.51 x 1.44 x 1.66 cm. The isthmus measures 0.26 cm. \par The right thyroid lobe has a heterogeneous parenchyma. \par  \par In the right lower pole there is a  predominantly solid, with isoechoic solid component. It measures 0.92 x 0.95 x 0.92 cm. The nodule is round in shape and the border is smooth. The nodule has a halo. It demonstrates macrocalcifications. It has no vascularity. \par \par In the left lower pole there is a  mixed, predominantly solid, with isoechoic solid component. It measures 0.72 x 0.78 x 0.51 cm. The nodule is round in shape and the border is smooth. The nodule has a halo. It demonstrates macrocalcifications. It has peripheral and scant internal vascularity. \par \par In the right mid pole there is a  predominantly solid. It measures 0.91 x 0.76 x 0.89 cm. The nodule is ovoid in shape and the border is smooth. The nodule has a halo. It demonstrates no calcifications. It has no vascularity. \par \par In the right mid pole there is a  mixed. It measures 0.74 x 0.58 x 0.74 cm. The nodule is round in shape and the border is smooth. The nodule has a halo. It has peripheral vascularity. \par \par Labs 11/2019:\par CBC normal\par CMP normal except glucose of 68 and mildly elevated AST ALT\par \par HDL 49\par Chol 209\par Trig 166\par \par Labs 7/15/19:\par A1c 5.8%\par CMP normal\par TSH 3.46\par Free T4 1.8\par Testosterone 198\par LH 4.0\par FSH 1.5\par \par Labs 1/2019:\par TSH 4.18\par Free T4 1.8\par A1c 5.7%\par CMP normal\par Vit D 32\par \par Labs 11/28/18:\par Cr 1.35\par CMP normal\par \par HDL 47\par Chol 193\par Trig 114\par \par Thyroid Ultrasound Report 9/18/18:\par \par Technique: multiple real time longitudinal and transverse images were obtained using a high resolution ultrasound with a linear transducer, [employer] e 2008 model, 10-12 MHz frequencies. All measurements will be reported as longitudinal x scott-posterior x transverse. \par Comparison: Report dated 10/2017. \par \par Indication: multinodular goiter. \par \par Findings: \par The right thyroid lobe measures 3.23 x 1.98 x 1.40 cm. The left thyroid lobe measures 3.51 x 1.44 x 1.66 cm. The isthmus measures 0.31 cm. \par The right thyroid lobe has a heterogeneous parenchyma. \par The left thyroid lobe has a heterogeneous parenchyma . \par  \par In the right lower pole there is a  predominantly solid, with isoechoic solid component. It measures 1.06 x 0.83 x 0.94 cm. The nodule is round in shape and the border is smooth. The nodule has a halo. It demonstrates macrocalcifications. It has peripheral and scant internal vascularity. \par \par In the left lower pole there is a  mixed, predominantly solid, with isoechoic solid component. It measures 1.05 x 0.74 x 0.84 cm. The nodule is round in shape and the border is smooth. The nodule has a halo. It demonstrates macrocalcifications. It has peripheral and scant internal vascularity. \par \par In the right mid pole there is a  predominantly solid. It measures 1.11 x 0.70 x 0.81 cm. The nodule is ovoid in shape and the border is smooth. The nodule has a halo. It demonstrates no calcifications. It has peripheral vascularity. \par \par In the right mid pole there is a  mixed. It measures 0.83 x 0.82 x 1.05 cm. The nodule is round in shape and the border is smooth. The nodule has a halo. It has peripheral vascularity. \par \par \par Labs 4/2018:\par TSH 4.31\par Free T4 1.4\par \par Labs 12/2017\par Hb 17.7\par CMP normal\par \par HDL 44\par Chol 241\par Trig 77\par \par Labs 10/2017:\par Cortisol 8.4\par TSH 4.86\par Free T4 1.4\par ACTH 18\par Prolactin 14.7\par FSH <0.1\par LH < 0.1\par IGF-1 150\par Testosterone 64.4\par \par \par Thyroid Ultrasound Report 10/2017\par \par Technique: multiple real time longitudinal and transverse images were obtained using a high resolution ultrasound with a linear transducer, [employer] e 2008 model, 10-12 MHz frequencies. All measurements will be reported as longitudinal x scott-posterior x transverse. \par Comparison: Report dated 10/2016. \par \par Indication: multinodular goiter. \par \par Findings: \par The right thyroid lobe measures 2.53 x 1.38 x 1.34 cm. The left thyroid lobe measures 3.41 x 1.18 x 0.99 cm. The isthmus measures 0.26 cm. \par The right thyroid lobe has a heterogeneous parenchyma. \par The left thyroid lobe has a heterogeneous parenchyma . \par  \par In the right lower pole there is a  predominantly solid, with isoechoic solid component. It measures 1.45 x 0.87 x 0.87 cm. The nodule is round in shape and the border is smooth. The nodule has a halo. It demonstrates macrocalcifications. It has peripheral and scant internal vascularity. \par \par In the left lower pole there is a  mixed, predominantly solid, with isoechoic solid component. It measures 0.66 x 0.75 x 0.85 cm. The nodule is round in shape and the border is smooth. The nodule has a halo. It demonstrates macrocalcifications. It has peripheral and scant internal vascularity. \par Labs 1/11/17:\par \par HDL 40\par Chol 227\par Trig 98\par Creatinine 1.36\par CMP otherwise normal except AST of 51\par TSH 3.70\par \par Pathology Right Lower Pole Thyroid Nodule 10/2016: Benign\par \par Thyroid Ultrasound Report 10/18/16:\par \par Technique: multiple real time longitudinal and transverse images were obtained using a high resolution ultrasound with a linear transducer, [employer] e 2008 model, 10-12 MHz frequencies. All measurements will be reported as longitudinal x scott-posterior x transverse. \par Comparison: Report dated 4/2016. \par \par Indication: multinodular goiter. \par \par Findings: \par The right thyroid lobe measures 2.61 x 1.90 x 1.54 cm. The left thyroid lobe measures 3.1 x 1.3 x 1.26 cm. The isthmus measures 0.29 cm. \par The right thyroid lobe has a heterogeneous parenchyma. \par The left thyroid lobe has a heterogeneous parenchyma . \par  \par In the right lower pole there is a  predominantly solid, with isoechoic solid component. It measures 1.56 x 0.9 x 1.2 cm. The nodule is round in shape and the border is smooth. The nodule has a halo. It demonstrates macrocalcifications. It has peripheral and scant internal vascularity. \par \par In the left lower pole there is a  mixed, predominantly solid, with isoechoic solid component. It measures 1.05 x 0.97 x 1.01 cm. The nodule is round in shape and the border is smooth. The nodule has a halo. It demonstrates macrocalcifications. It has peripheral and scant internal vascularity. \par \par Labs 7/2016:\par TSH 8.45\par Free T4 1.3\par TPO Ab neg.\par \par FNA Right Lower 4/2016: Benign\par \par Labs 3/29/16:\par TSH 7.08\par Free T4 1.4\par Anti-TPO Ab negative\par \par Thyroid Ultrasound Report 3/29/16:\par \par Technique: multiple real time longitudinal and transverse images were obtained using a high resolution ultrasound with a linear transducer, [employer] e 2008 model, 10-12 MHz frequencies. All measurements will be reported as longitudinal x scott-posterior x transverse. \par Comparison: None. \par \par Indication: thyroid nodule. \par \par Findings: \par The right thyroid lobe measures 2.37 x 1.44 x 1.62 cm. The left thyroid lobe measures 2.11 x 1.18 x 1.39 cm. The isthmus measures 0.32 cm. \par The right thyroid lobe has a heterogeneous parenchyma. \par The left thyroid lobe has a heterogeneous parenchyma . \par  \par In the right lower pole there is a  predominantly solid, with isoechoic solid component. It measures 0.87 x 0.89 x 0.94 cm. The nodule is round in shape and the border is smooth. The nodule has a halo. It demonstrates macrocalcifications. It has peripheral and scant internal vascularity. \par \par In the right lower pole there is a  predominantly solid, with isoechoic solid component. It measures 1.05 x 0.97 x 1.01 cm. The nodule is round in shape and the border is smooth. The nodule has a halo. It demonstrates macrocalcifications. It has peripheral and scant internal vascularity. \par

## 2021-11-10 NOTE — HISTORY OF PRESENT ILLNESS
[FreeTextEntry1] : 45 y/o M diagnosed with thyroid nodules in 2013 which were found incidentally on MRI which was performed for facial pain s/p surgery for squamous cell cancer, also with possible facial nerve damage as he has been experiencing frequent facial spasms unrelieved by Botox. Seen initially by me 3/29/16 with thyroid U/S confirming 2 lower pole right thyroid nodules with microcalcifications largest 1.1cm. s/p FNA 10/2016 with benign findings. Last thyroid US 5/2021 stable. Chemically with subclinical hypothyroidism at that time possibly secondary to radiation exposure from neck cancer so was started on levothyroxine 50 mcg daily. Dose increased to 88 mcg 7/2016. Dose again increased to 100 mcg after TSH found to be mildly elevated at 5.3 (10/2016). Repeat TFTs 1/2017 improved with TSH of 3.7.  TSH 4.86 dose increased to 112 mcg (10/2017) further increased to 125 mcg for TSH of 4.3 (4/2018). Repeat TSH 4.18 (1/2019) and has remained chemically euthyroid since then with last TFTs 10/2021. Takes Synthroid daily in am on empty stomach, eats breakfast 1 hour later. Denies missing doses. Denies current fevers, chills, night sweats, chest pain, SOB, abdominal pain, nausea, vomiting, diarrhea, constipation. Reports chronic dry skin on hands, but no recent changes. Reports heat and cold intolerance - unable to tolerate extremes in weather. Reports chronic dysphagia and chronic neck pain. +occasional palpitations especially at night. Had been exposed to [facility] site. Has a history of radiation to the neck for treatment for squamous cell cancer - last RT August 2016. +weight stable.\par \par Of note, patient states that he follows with a urologist for history of nephrolithiasis; had his testosterone levels check for complaint of low energy and found to have low level. Underwent MRI head for further evaluation, was told that his pituitary was affected by prior RT, but also has a hx of anabolic steroid use. Was started on HCG injections weekly at the end of March 2017. Was on testosterone injection 200 mg every 2 weeks. Not looking to have children at this time. Has noticed increased mood swings. Recommended trial of Clomid 9/2018 with testosterone level of 230.9. Was taking 25 mg every other day. Had not noticed change in libido. Has been off Clomid since 1/2019 as insurance would not cover it. Also with persistent fatigue and low energy. Treated for sleep apnea with CPAP at night, but occasionally nonadherent. Testosterone level off replacement was low at 94. Now started back on testosterone injections 0.5ml weekly. Last dose 5 days ago. Feels better on the testosterone injections, but would prefer alternative method such as oral testosterone or better adherence and stability in levels.

## 2021-11-10 NOTE — PHYSICAL EXAM
[Alert] : alert [Well Nourished] : well nourished [No Acute Distress] : no acute distress [Healthy Appearance] : healthy appearance [Well Developed] : well developed [No Proptosis] : no proptosis [No Respiratory Distress] : no respiratory distress [No Accessory Muscle Use] : no accessory muscle use [Clear to Auscultation] : lungs were clear to auscultation bilaterally [Normal to Percussion] : lungs were normal to percussion [Normal S1, S2] : normal S1 and S2 [Normal Rate] : heart rate was normal [Regular Rhythm] : with a regular rhythm [No Edema] : no peripheral edema [Normal Bowel Sounds] : normal bowel sounds [Not Tender] : non-tender [Not Distended] : not distended [Soft] : abdomen soft [No Stigmata of Cushings Syndrome] : no stigmata of Cushings Syndrome [No Clubbing, Cyanosis] : no clubbing  or cyanosis of the fingernails [Normal Strength/Tone] : muscle strength and tone were normal [No Motor Deficits] : the motor exam was normal [No Tremors] : no tremors [Oriented x3] : oriented to person, place, and time [Normal Affect] : the affect was normal [Normal Mood] : the mood was normal [Kyphosis] : no kyphosis present [Acanthosis Nigricans] : no acanthosis nigricans [de-identified] : +palpable scar tissue in the neck; heterogenous thyroid

## 2021-11-10 NOTE — REVIEW OF SYSTEMS
[Fatigue] : fatigue [Insomnia] : insomnia [All other systems negative] : All other systems negative [Recent Weight Gain (___ Lbs)] : no recent weight gain [Recent Weight Loss (___ Lbs)] : no recent weight loss [Visual Field Defect] : no visual field defect [Dysphagia] : no dysphagia [Dysphonia] : no dysphonia [Chest Pain] : no chest pain [Palpitations] : no palpitations [Shortness Of Breath] : no shortness of breath [Nausea] : no nausea [Constipation] : no constipation [Vomiting] : no vomiting [Diarrhea] : no diarrhea [Polyuria] : no polyuria [Joint Pain] : no joint pain [Headaches] : no headaches [Tremors] : no tremors [Polydipsia] : no polydipsia [Cold Intolerance] : no cold intolerance [Heat Intolerance] : no heat intolerance

## 2021-11-15 ENCOUNTER — APPOINTMENT (OUTPATIENT)
Dept: OTHER | Facility: CLINIC | Age: 46
End: 2021-11-15
Payer: COMMERCIAL

## 2021-11-15 VITALS
SYSTOLIC BLOOD PRESSURE: 193 MMHG | DIASTOLIC BLOOD PRESSURE: 83 MMHG | OXYGEN SATURATION: 96 % | HEART RATE: 84 BPM | HEIGHT: 73 IN | RESPIRATION RATE: 18 BRPM | WEIGHT: 252 LBS | TEMPERATURE: 97.1 F | BODY MASS INDEX: 33.4 KG/M2

## 2021-11-15 PROCEDURE — 90686 IIV4 VACC NO PRSV 0.5 ML IM: CPT

## 2021-11-15 PROCEDURE — G0008: CPT

## 2021-11-15 PROCEDURE — 99396 PREV VISIT EST AGE 40-64: CPT | Mod: 25

## 2021-11-15 PROCEDURE — 99214 OFFICE O/P EST MOD 30 MIN: CPT | Mod: 25

## 2021-11-15 RX ORDER — DOXEPIN HYDROCHLORIDE 10 MG/1
10 CAPSULE ORAL
Refills: 0 | Status: COMPLETED | COMMUNITY
End: 2021-11-15

## 2021-11-16 ENCOUNTER — APPOINTMENT (OUTPATIENT)
Dept: SURGERY | Facility: CLINIC | Age: 46
End: 2021-11-16
Payer: COMMERCIAL

## 2021-11-16 LAB
APPEARANCE: CLEAR
BACTERIA: NEGATIVE
BILIRUBIN URINE: NEGATIVE
BLOOD URINE: NEGATIVE
COLOR: NORMAL
GLUCOSE QUALITATIVE U: NEGATIVE
HYALINE CASTS: 0 /LPF
KETONES URINE: NEGATIVE
LEUKOCYTE ESTERASE URINE: NEGATIVE
MICROSCOPIC-UA: NORMAL
NITRITE URINE: NEGATIVE
PH URINE: 6.5
PROTEIN URINE: NEGATIVE
RED BLOOD CELLS URINE: 0 /HPF
SPECIFIC GRAVITY URINE: 1.01
SQUAMOUS EPITHELIAL CELLS: 0 /HPF
UROBILINOGEN URINE: NORMAL
WHITE BLOOD CELLS URINE: 0 /HPF

## 2021-11-16 PROCEDURE — 99214 OFFICE O/P EST MOD 30 MIN: CPT

## 2021-11-16 NOTE — ASSESSMENT
[FreeTextEntry1] : will observe. to return earlier if any change.  patient has been given the opportunity to ask questions, and all of the patient's questions have been answered to their satisfaction.  living in [place]

## 2021-11-16 NOTE — PHYSICAL EXAM
[de-identified] : changes from prior surgery [de-identified] : no palpable thyroid nodules [Laryngoscopy Performed] : laryngoscopy was performed, see procedure section for findings [Midline] : located in midline position [Normal] : orientation to person, place, and time: normal [FreeTextEntry2] : no change in trismus.  left posterior scalp swelling overlying nuchal ridge

## 2021-11-16 NOTE — HISTORY OF PRESENT ILLNESS
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  denies pain, bleeding, dysphagia, hoarseness or new lesions.  . last thyroid sonogram unchanged.   continues Synthroid 125 mcg daily.  no changes medically since last visit. I have reviewed all old and new data and available images.  f/u last CT with neurosurgeon - nothing needs to be done.  recent TFT's reportedly normal. \par

## 2021-11-18 ENCOUNTER — APPOINTMENT (OUTPATIENT)
Dept: SURGERY | Facility: CLINIC | Age: 46
End: 2021-11-18

## 2021-11-25 ENCOUNTER — FORM ENCOUNTER (OUTPATIENT)
Age: 46
End: 2021-11-25

## 2021-12-16 NOTE — HISTORY OF PRESENT ILLNESS
[FreeTextEntry1] : 44 y/o M diagnosed with thyroid nodules in 2013 which were found incidentally on MRI which was performed for facial pain s/p surgery for squamous cell cancer, also with possible facial nerve damage as he has been experiencing frequent facial spasms unrelieved by Botox. Seen initially by me 3/29/16 with thyroid U/S confirming 2 lower pole right thyroid nodules with microcalcifications largest 1.1cm. s/p FNA 10/2016 with benign findings. Last thyroid US 9/2018. Chemically with subclinical hypothyroidism at that time possibly secondary to radiation exposure from neck cancer so was started on levothyroxine 50 mcg daily. Dose increased to 88 mcg 7/2016. Dose again increased to 100 mcg after TSH found to be mildly elevated at 5.3 (10/2016). Repeat TFTs 1/2017 improved with TSH of 3.7.  TSH 4.86 dose increased to 112 mcg (10/2017) further increased to 125 mcg for TSH of 4.3 (4/2018). Last TSH 4.18 (1/2019). Takes Synthroid daily in am on empty stomach, eats breakfast 1 hour later. Denies missing doses. Denies current fevers, chills, night sweats, chest pain, SOB, abdominal pain, nausea, vomiting, diarrhea, constipation. Reports chronic dry skin on hands, but no recent changes. Reports heat and cold intolerance - unable to tolerate extremes in weather. Reports chronic dysphagia and chronic neck pain. +occasional palpitations especially at night. Had been exposed to [facility] site. Has a history of radiation to the neck for treatment for squamous cell cancer - last RT August 2016. +weight stable.\par \par Of note, patient states that he follows with a urologist for history of nephrolithiasis; had his testosterone levels check for complaint of low energy and found to have low level. Underwent MRI head for further evaluation, was told that his pituitary was affected by prior RT, but also has a hx of anabolic steroid use. Was started on HCG injections weekly at the end of March 2017. Was on testosterone injection 200 mg every 2 weeks. Not looking to have children at this time, but wife may want more children. Started working out more. Has noticed increased mood swings. Recommended trial of Clomid 9/2018 with testosterone level of 230.9. Was taking 25 mg every other day. Had not noticed change in libido. Has been off Clomid since 1/2019 as insurance would not cover it. Also with persistent fatigue and low energy. Treated for sleep apnea with CPAP at night. 
Detail Level: Zone

## 2022-01-10 ENCOUNTER — FORM ENCOUNTER (OUTPATIENT)
Age: 47
End: 2022-01-10

## 2022-01-30 ENCOUNTER — FORM ENCOUNTER (OUTPATIENT)
Age: 47
End: 2022-01-30

## 2022-04-14 ENCOUNTER — RX RENEWAL (OUTPATIENT)
Age: 47
End: 2022-04-14

## 2022-05-02 ENCOUNTER — FORM ENCOUNTER (OUTPATIENT)
Age: 47
End: 2022-05-02

## 2022-06-06 ENCOUNTER — FORM ENCOUNTER (OUTPATIENT)
Age: 47
End: 2022-06-06

## 2022-06-08 ENCOUNTER — APPOINTMENT (OUTPATIENT)
Dept: ENDOCRINOLOGY | Facility: CLINIC | Age: 47
End: 2022-06-08
Payer: COMMERCIAL

## 2022-06-08 PROCEDURE — 99214 OFFICE O/P EST MOD 30 MIN: CPT | Mod: 95

## 2022-06-08 NOTE — REVIEW OF SYSTEMS
[Fatigue] : fatigue [Recent Weight Gain (___ Lbs)] : recent weight gain: [unfilled] lbs [Recent Weight Loss (___ Lbs)] : no recent weight loss [Visual Field Defect] : no visual field defect [Dysphagia] : no dysphagia [Dysphonia] : no dysphonia [Chest Pain] : no chest pain [Palpitations] : no palpitations [Shortness Of Breath] : no shortness of breath [Nausea] : no nausea [Constipation] : no constipation [Vomiting] : no vomiting [Diarrhea] : no diarrhea [Polyuria] : no polyuria [Joint Pain] : no joint pain [Headaches] : no headaches [Tremors] : no tremors [Insomnia] : insomnia [Polydipsia] : no polydipsia [Cold Intolerance] : no cold intolerance [Heat Intolerance] : no heat intolerance [All other systems negative] : All other systems negative

## 2022-06-08 NOTE — PHYSICAL EXAM
[Alert] : alert [Well Nourished] : well nourished [Healthy Appearance] : healthy appearance [No Acute Distress] : no acute distress [Well Developed] : well developed [No Proptosis] : no proptosis [No Respiratory Distress] : no respiratory distress [No Accessory Muscle Use] : no accessory muscle use [No Edema] : no peripheral edema [Kyphosis] : no kyphosis present [No Stigmata of Cushings Syndrome] : no stigmata of Cushings Syndrome [No Clubbing, Cyanosis] : no clubbing  or cyanosis of the fingernails [Normal Strength/Tone] : muscle strength and tone were normal [Acanthosis Nigricans] : no acanthosis nigricans [No Motor Deficits] : the motor exam was normal [Oriented x3] : oriented to person, place, and time [Normal Affect] : the affect was normal [Normal Mood] : the mood was normal [de-identified] : +scar tissue in the neck

## 2022-06-08 NOTE — ASSESSMENT
[FreeTextEntry1] : 45 y/o M w/multinodular goiter on Synthroid for radiation induced hypothyroidism and hypogonadism here for follow up.\par \par 1. Multinodular Goiter- Thyroid U/S FNA  performed 10/2016 for increased nodule size- results revealed multinodular goiter. Repeat thyroid US performed 10/2017, 9/2018, 9/2019, 5/2021 with stable nodules (see results section for full description). Repeat thyroid US 5/2023. \par \par 2. Hypothyroidism- started on levothyroxine. Now on Synthroid 112 mcg. Clinically and chemically euthyroid. C/w Synthroid 112 mcg will adjust as needed. If TSH above goal would recommend 112 mcg daily with extra 1/2 tab on Sundays as 125 mcg was too high of a dose for him in the past with Free T4 of 2.0 (5/2021). \par \par 3. Hypogonadism- s/p HCG with trial of Clomid. Likely due to hx of anabolic steroid abuse in the past vs. radiation induced. Now on testosterone injections 0.5ml every week. Last dose 7 days. Consider Hi for improved adherence and stability in levels. \par \par 5. Hypercalcemia- resolved on last lab work. \par \par Total Time 30 min

## 2022-06-08 NOTE — DATA REVIEWED
[FreeTextEntry1] : Labs 5/2021:\par A1c 5.4%\par \par Chol 205\par HDL 42\par \par CMP normal\par PTH 26\par Ca 9.8\par TSH 3.57\par Free T4 2.0\par \par Labs 11/9/2021:\par TSH 2.6\par \par \par Labs 11/5/2021:\par Hb 15.7\par HCT 47.9\par \par Labs 10/2021:\par Hb 16.8\par HCT 51.8\par Testosterone 94\par CMP normal\par \par Thyroid Ultrasound Report 5/26/2021:\par \par Technique: multiple real time longitudinal and transverse images were obtained using a high resolution ultrasound with a linear transducer, Tapru e 2008 model, 10-12 MHz frequencies. All measurements will be reported as longitudinal x scott-posterior x transverse. \par Comparison: Report dated 11/2019. \par \par Indication: multinodular goiter. \par \par Findings: \par The right thyroid lobe measures 4.83 x 1.33 x 1.81 cm. The left thyroid lobe measures 3.42 x 1.40 x 1.57 cm. The isthmus measures 0.34 cm. \par The right thyroid lobe has a heterogeneous parenchyma. \par The left thyroid lobe has a heterogeneous parenchyma. \par  \par In the right lower pole there is a  predominantly solid, with isoechoic solid component. It measures 0.93 x 0.99 x 0.86 cm. The nodule is round in shape and the border is smooth. The nodule has a halo. It demonstrates macrocalcifications. It has no vascularity. \par \par In the left lower pole there is a  mixed, predominantly solid, with isoechoic solid component. It measures 0.6 x 0.64 x 0.50 cm. The nodule is round in shape and the border is smooth. The nodule has a halo. It demonstrates macrocalcifications. It has peripheral and scant internal vascularity. \par \par In the right mid pole there is a  predominantly solid. It measures 1.46 x 0.93 x 0.64 cm. The nodule is ovoid in shape and the border is smooth. The nodule has a halo. It demonstrates no calcifications. It has no vascularity. \par \par In the right mid pole there is a  mixed. It measures 0.74 x 1.06 x 0.83 cm. The nodule is round in shape and the border is smooth. The nodule has a halo. It has peripheral vascularity. \par \par Labs 4/2021:\par Phos 2.3\par Ca 10.6\par No albumin checked\par Glucose 66\par BMP otherwise normal\par \par Labs 12/8/2020:\par LH 4.4\par FSH 1.6\par TSH 1.93\par Free T4 1.8\par Testosterone 189\par \par Labs 5/2020:\par TSH 3.73\par Free T4 1.7\par TPO Ab neg.\par LFTs normal\par \par Thyroid Ultrasound Report 11/18/19:\par \par Technique: multiple real time longitudinal and transverse images were obtained using a high resolution ultrasound with a linear transducer, Tapru e 2008 model, 10-12 MHz frequencies. All measurements will be reported as longitudinal x scott-posterior x transverse. \par Comparison: Report dated 9/2018. \par \par Indication: multinodular goiter. \par \par Findings: \par The right thyroid lobe measures 3.94 x 1.34 x 1.64 cm. The left thyroid lobe measures 3.51 x 1.44 x 1.66 cm. The isthmus measures 0.26 cm. \par The right thyroid lobe has a heterogeneous parenchyma. \par  \par In the right lower pole there is a  predominantly solid, with isoechoic solid component. It measures 0.92 x 0.95 x 0.92 cm. The nodule is round in shape and the border is smooth. The nodule has a halo. It demonstrates macrocalcifications. It has no vascularity. \par \par In the left lower pole there is a  mixed, predominantly solid, with isoechoic solid component. It measures 0.72 x 0.78 x 0.51 cm. The nodule is round in shape and the border is smooth. The nodule has a halo. It demonstrates macrocalcifications. It has peripheral and scant internal vascularity. \par \par In the right mid pole there is a  predominantly solid. It measures 0.91 x 0.76 x 0.89 cm. The nodule is ovoid in shape and the border is smooth. The nodule has a halo. It demonstrates no calcifications. It has no vascularity. \par \par In the right mid pole there is a  mixed. It measures 0.74 x 0.58 x 0.74 cm. The nodule is round in shape and the border is smooth. The nodule has a halo. It has peripheral vascularity. \par \par Labs 11/2019:\par CBC normal\par CMP normal except glucose of 68 and mildly elevated AST ALT\par \par HDL 49\par Chol 209\par Trig 166\par \par Labs 7/15/19:\par A1c 5.8%\par CMP normal\par TSH 3.46\par Free T4 1.8\par Testosterone 198\par LH 4.0\par FSH 1.5\par \par Labs 1/2019:\par TSH 4.18\par Free T4 1.8\par A1c 5.7%\par CMP normal\par Vit D 32\par \par Labs 11/28/18:\par Cr 1.35\par CMP normal\par \par HDL 47\par Chol 193\par Trig 114\par \par Thyroid Ultrasound Report 9/18/18:\par \par Technique: multiple real time longitudinal and transverse images were obtained using a high resolution ultrasound with a linear transducer, Tapru e 2008 model, 10-12 MHz frequencies. All measurements will be reported as longitudinal x scott-posterior x transverse. \par Comparison: Report dated 10/2017. \par \par Indication: multinodular goiter. \par \par Findings: \par The right thyroid lobe measures 3.23 x 1.98 x 1.40 cm. The left thyroid lobe measures 3.51 x 1.44 x 1.66 cm. The isthmus measures 0.31 cm. \par The right thyroid lobe has a heterogeneous parenchyma. \par The left thyroid lobe has a heterogeneous parenchyma . \par  \par In the right lower pole there is a  predominantly solid, with isoechoic solid component. It measures 1.06 x 0.83 x 0.94 cm. The nodule is round in shape and the border is smooth. The nodule has a halo. It demonstrates macrocalcifications. It has peripheral and scant internal vascularity. \par \par In the left lower pole there is a  mixed, predominantly solid, with isoechoic solid component. It measures 1.05 x 0.74 x 0.84 cm. The nodule is round in shape and the border is smooth. The nodule has a halo. It demonstrates macrocalcifications. It has peripheral and scant internal vascularity. \par \par In the right mid pole there is a  predominantly solid. It measures 1.11 x 0.70 x 0.81 cm. The nodule is ovoid in shape and the border is smooth. The nodule has a halo. It demonstrates no calcifications. It has peripheral vascularity. \par \par In the right mid pole there is a  mixed. It measures 0.83 x 0.82 x 1.05 cm. The nodule is round in shape and the border is smooth. The nodule has a halo. It has peripheral vascularity. \par \par \par Labs 4/2018:\par TSH 4.31\par Free T4 1.4\par \par Labs 12/2017\par Hb 17.7\par CMP normal\par \par HDL 44\par Chol 241\par Trig 77\par \par Labs 10/2017:\par Cortisol 8.4\par TSH 4.86\par Free T4 1.4\par ACTH 18\par Prolactin 14.7\par FSH <0.1\par LH < 0.1\par IGF-1 150\par Testosterone 64.4\par \par \par Thyroid Ultrasound Report 10/2017\par \par Technique: multiple real time longitudinal and transverse images were obtained using a high resolution ultrasound with a linear transducer, Tapru e 2008 model, 10-12 MHz frequencies. All measurements will be reported as longitudinal x scott-posterior x transverse. \par Comparison: Report dated 10/2016. \par \par Indication: multinodular goiter. \par \par Findings: \par The right thyroid lobe measures 2.53 x 1.38 x 1.34 cm. The left thyroid lobe measures 3.41 x 1.18 x 0.99 cm. The isthmus measures 0.26 cm. \par The right thyroid lobe has a heterogeneous parenchyma. \par The left thyroid lobe has a heterogeneous parenchyma . \par  \par In the right lower pole there is a  predominantly solid, with isoechoic solid component. It measures 1.45 x 0.87 x 0.87 cm. The nodule is round in shape and the border is smooth. The nodule has a halo. It demonstrates macrocalcifications. It has peripheral and scant internal vascularity. \par \par In the left lower pole there is a  mixed, predominantly solid, with isoechoic solid component. It measures 0.66 x 0.75 x 0.85 cm. The nodule is round in shape and the border is smooth. The nodule has a halo. It demonstrates macrocalcifications. It has peripheral and scant internal vascularity. \par Labs 1/11/17:\par \par HDL 40\par Chol 227\par Trig 98\par Creatinine 1.36\par CMP otherwise normal except AST of 51\par TSH 3.70\par \par Pathology Right Lower Pole Thyroid Nodule 10/2016: Benign\par \par Thyroid Ultrasound Report 10/18/16:\par \par Technique: multiple real time longitudinal and transverse images were obtained using a high resolution ultrasound with a linear transducer, Tapru e 2008 model, 10-12 MHz frequencies. All measurements will be reported as longitudinal x scott-posterior x transverse. \par Comparison: Report dated 4/2016. \par \par Indication: multinodular goiter. \par \par Findings: \par The right thyroid lobe measures 2.61 x 1.90 x 1.54 cm. The left thyroid lobe measures 3.1 x 1.3 x 1.26 cm. The isthmus measures 0.29 cm. \par The right thyroid lobe has a heterogeneous parenchyma. \par The left thyroid lobe has a heterogeneous parenchyma . \par  \par In the right lower pole there is a  predominantly solid, with isoechoic solid component. It measures 1.56 x 0.9 x 1.2 cm. The nodule is round in shape and the border is smooth. The nodule has a halo. It demonstrates macrocalcifications. It has peripheral and scant internal vascularity. \par \par In the left lower pole there is a  mixed, predominantly solid, with isoechoic solid component. It measures 1.05 x 0.97 x 1.01 cm. The nodule is round in shape and the border is smooth. The nodule has a halo. It demonstrates macrocalcifications. It has peripheral and scant internal vascularity. \par \par Labs 7/2016:\par TSH 8.45\par Free T4 1.3\par TPO Ab neg.\par \par FNA Right Lower 4/2016: Benign\par \par Labs 3/29/16:\par TSH 7.08\par Free T4 1.4\par Anti-TPO Ab negative\par \par Thyroid Ultrasound Report 3/29/16:\par \par Technique: multiple real time longitudinal and transverse images were obtained using a high resolution ultrasound with a linear transducer, Tapru e 2008 model, 10-12 MHz frequencies. All measurements will be reported as longitudinal x scott-posterior x transverse. \par Comparison: None. \par \par Indication: thyroid nodule. \par \par Findings: \par The right thyroid lobe measures 2.37 x 1.44 x 1.62 cm. The left thyroid lobe measures 2.11 x 1.18 x 1.39 cm. The isthmus measures 0.32 cm. \par The right thyroid lobe has a heterogeneous parenchyma. \par The left thyroid lobe has a heterogeneous parenchyma . \par  \par In the right lower pole there is a  predominantly solid, with isoechoic solid component. It measures 0.87 x 0.89 x 0.94 cm. The nodule is round in shape and the border is smooth. The nodule has a halo. It demonstrates macrocalcifications. It has peripheral and scant internal vascularity. \par \par In the right lower pole there is a  predominantly solid, with isoechoic solid component. It measures 1.05 x 0.97 x 1.01 cm. The nodule is round in shape and the border is smooth. The nodule has a halo. It demonstrates macrocalcifications. It has peripheral and scant internal vascularity. \par

## 2022-06-08 NOTE — HISTORY OF PRESENT ILLNESS
[Home] : at home, [unfilled] , at the time of the visit. [Medical Office: ([facility])___] : at the medical office located in  [Verbal consent obtained from patient] : the patient, [unfilled] [FreeTextEntry1] : 47 y/o M diagnosed with thyroid nodules in 2013 which were found incidentally on MRI which was performed for facial pain s/p surgery for squamous cell cancer, also with possible facial nerve damage as he has been experiencing frequent facial spasms unrelieved by Botox. Seen initially by me 3/29/16 with thyroid U/S confirming 2 lower pole right thyroid nodules with microcalcifications largest 1.1cm. s/p FNA 10/2016 with benign findings. Last thyroid US 5/2021 stable. Chemically with subclinical hypothyroidism at that time possibly secondary to radiation exposure from neck cancer so was started on levothyroxine 50 mcg daily. Dose increased to 88 mcg 7/2016. Dose again increased to 100 mcg after TSH found to be mildly elevated at 5.3 (10/2016). Repeat TFTs 1/2017 improved with TSH of 3.7.  TSH 4.86 dose increased to 112 mcg (10/2017). Repeat TSH 4.18 (1/2019) and has remained chemically euthyroid since then until 5/2021 when Free T4 was 2.0 so dose was lowered back to 112 mcg. Takes Synthroid daily in am on empty stomach, eats breakfast 1 hour later. Denies missing doses. Denies current fevers, chills, night sweats, chest pain, SOB, abdominal pain, nausea, vomiting, diarrhea, constipation. Reports chronic dry skin on hands, but no recent changes. Reports heat and cold intolerance - unable to tolerate extremes in weather. Reports chronic dysphagia and chronic neck pain. +occasional palpitations especially at night. Had been exposed to [facility] site. Has a history of radiation to the neck for treatment for squamous cell cancer - last RT August 2016. +weight gain with less activity.\par \par Of note, patient states that he follows with a urologist for history of nephrolithiasis; had his testosterone levels check for complaint of low energy and found to have low level. Underwent MRI head for further evaluation, was told that his pituitary was affected by prior RT, but also has a hx of anabolic steroid use. Was started on HCG injections weekly at the end of March 2017. Was on testosterone injection 200 mg every 2 weeks. Not looking to have children at this time. Has noticed increased mood swings. Recommended trial of Clomid 9/2018 with testosterone level of 230.9. Was taking 25 mg every other day. Had not noticed change in libido. Has been off Clomid since 1/2019 as insurance would not cover it. Also with persistent fatigue and low energy. Treated for sleep apnea with CPAP at night, but occasionally nonadherent. Testosterone level off replacement was low at 94. Now started back on testosterone injections 0.5ml weekly. Last dose 7 days ago. Feels better on the testosterone injections, but would prefer alternative method such as oral testosterone or better adherence and stability in levels.

## 2022-06-09 ENCOUNTER — APPOINTMENT (OUTPATIENT)
Dept: SURGERY | Facility: CLINIC | Age: 47
End: 2022-06-09
Payer: COMMERCIAL

## 2022-06-09 DIAGNOSIS — Z00.00 ENCOUNTER FOR GENERAL ADULT MEDICAL EXAMINATION W/OUT ABNORMAL FINDINGS: ICD-10-CM

## 2022-06-09 PROCEDURE — 99214 OFFICE O/P EST MOD 30 MIN: CPT | Mod: 25

## 2022-06-09 PROCEDURE — 36415 COLL VENOUS BLD VENIPUNCTURE: CPT

## 2022-06-09 RX ORDER — DIBUCAINE 1% 1 G/100G
1 CREAM TOPICAL
Qty: 28 | Refills: 0 | Status: ACTIVE | COMMUNITY
Start: 2022-04-13

## 2022-06-09 RX ORDER — FLUTICASONE PROPIONATE 50 UG/1
50 SPRAY, METERED NASAL
Qty: 16 | Refills: 0 | Status: ACTIVE | COMMUNITY
Start: 2022-01-17

## 2022-06-09 NOTE — PHYSICAL EXAM
[de-identified] : changes from prior surgery [de-identified] : no palpable thyroid nodules [Laryngoscopy Performed] : laryngoscopy was performed, see procedure section for findings [Midline] : located in midline position [Normal] : orientation to person, place, and time: normal [FreeTextEntry2] : no change in trismus.  left posterior scalp swelling overlying nuchal ridge

## 2022-06-09 NOTE — HISTORY OF PRESENT ILLNESS
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  denies pain, bleeding, dysphagia, hoarseness or new lesions.  . last thyroid sonogram unchanged.   continues Synthroid 125 mcg daily.  no changes medically since last visit. I have reviewed all old and new data and available images.  Additional information was obtained from others present at the time of visit to ensure the completeness of the history.   still undergoing Botox injections.  scalp biopsy 2 days ago - results pending

## 2022-06-09 NOTE — ASSESSMENT
[FreeTextEntry1] : will observe. to return earlier if any change.  patient has been given the opportunity to ask questions, and all of the patient's questions have been answered to their satisfaction.  living in [place]. for allergy testing next week. bloods drawn. to call next week for results.

## 2022-06-10 LAB
24R-OH-CALCIDIOL SERPL-MCNC: 62.9 PG/ML
CALCIUM SERPL-MCNC: 10.3 MG/DL
CALCIUM SERPL-MCNC: 10.3 MG/DL
PARATHYROID HORMONE INTACT: 20 PG/ML
T3 SERPL-MCNC: 124 NG/DL
T4 FREE SERPL-MCNC: 1.6 NG/DL
TSH SERPL-ACNC: 3.29 UIU/ML

## 2022-06-11 LAB
THYROGLOB AB SERPL-ACNC: <20 IU/ML
THYROPEROXIDASE AB SERPL IA-ACNC: <10 IU/ML

## 2022-06-13 ENCOUNTER — NON-APPOINTMENT (OUTPATIENT)
Age: 47
End: 2022-06-13

## 2022-07-15 ENCOUNTER — TRANSCRIPTION ENCOUNTER (OUTPATIENT)
Age: 47
End: 2022-07-15

## 2022-09-06 ENCOUNTER — FORM ENCOUNTER (OUTPATIENT)
Age: 47
End: 2022-09-06

## 2022-09-06 ENCOUNTER — TRANSCRIPTION ENCOUNTER (OUTPATIENT)
Age: 47
End: 2022-09-06

## 2022-09-07 ENCOUNTER — TRANSCRIPTION ENCOUNTER (OUTPATIENT)
Age: 47
End: 2022-09-07

## 2022-10-10 ENCOUNTER — FORM ENCOUNTER (OUTPATIENT)
Age: 47
End: 2022-10-10

## 2022-11-09 ENCOUNTER — APPOINTMENT (OUTPATIENT)
Dept: ENDOCRINOLOGY | Facility: CLINIC | Age: 47
End: 2022-11-09

## 2022-11-16 ENCOUNTER — FORM ENCOUNTER (OUTPATIENT)
Age: 47
End: 2022-11-16

## 2022-11-21 ENCOUNTER — APPOINTMENT (OUTPATIENT)
Dept: OTHER | Facility: CLINIC | Age: 47
End: 2022-11-21

## 2022-11-21 VITALS
RESPIRATION RATE: 18 BRPM | DIASTOLIC BLOOD PRESSURE: 80 MMHG | HEIGHT: 73 IN | TEMPERATURE: 98 F | OXYGEN SATURATION: 97 % | WEIGHT: 262 LBS | BODY MASS INDEX: 34.72 KG/M2 | SYSTOLIC BLOOD PRESSURE: 118 MMHG | HEART RATE: 85 BPM

## 2022-11-21 DIAGNOSIS — Z85.828 PERSONAL HISTORY OF OTHER MALIGNANT NEOPLASM OF SKIN: ICD-10-CM

## 2022-11-21 DIAGNOSIS — Z03.89 ENCOUNTER FOR OBSERVATION FOR OTHER SUSPECTED DISEASES AND CONDITIONS RULED OUT: ICD-10-CM

## 2022-11-21 PROCEDURE — G0008: CPT

## 2022-11-21 PROCEDURE — 99396 PREV VISIT EST AGE 40-64: CPT | Mod: 25

## 2022-11-21 PROCEDURE — 90686 IIV4 VACC NO PRSV 0.5 ML IM: CPT

## 2022-11-21 PROCEDURE — 94010 BREATHING CAPACITY TEST: CPT

## 2022-11-21 PROCEDURE — 99215 OFFICE O/P EST HI 40 MIN: CPT | Mod: 25

## 2022-11-21 RX ORDER — NEEDLES, DISPOSABLE 25GX5/8"
18G X 1" NEEDLE, DISPOSABLE MISCELLANEOUS
Qty: 4 | Refills: 0 | Status: ACTIVE | COMMUNITY
Start: 2022-07-21

## 2022-11-21 RX ORDER — FAMOTIDINE 20 MG/1
20 TABLET, FILM COATED ORAL
Refills: 0 | Status: COMPLETED | COMMUNITY
End: 2022-11-21

## 2022-11-21 RX ORDER — SYRINGE WITH NEEDLE, 1 ML 25GX5/8"
22G X 1" SYRINGE, EMPTY DISPOSABLE MISCELLANEOUS
Qty: 4 | Refills: 0 | Status: ACTIVE | COMMUNITY
Start: 2022-07-21

## 2022-11-21 RX ORDER — ANASTROZOLE TABLETS 1 MG/1
1 TABLET ORAL
Qty: 4 | Refills: 0 | Status: ACTIVE | COMMUNITY
Start: 2022-10-03

## 2022-11-21 RX ORDER — PROMETHAZINE HYDROCHLORIDE AND DEXTROMETHORPHAN HYDROBROMIDE ORAL SOLUTION 15; 6.25 MG/5ML; MG/5ML
6.25-15 SOLUTION ORAL
Qty: 200 | Refills: 0 | Status: COMPLETED | COMMUNITY
Start: 2022-03-03 | End: 2022-11-21

## 2022-11-21 RX ORDER — TESTOSTERONE CYPIONATE 100 MG/ML
100 INJECTION, SOLUTION INTRAMUSCULAR
Refills: 0 | Status: ACTIVE | COMMUNITY

## 2022-11-21 RX ORDER — ESOMEPRAZOLE MAGNESIUM 40 MG/1
40 CAPSULE, DELAYED RELEASE ORAL
Qty: 30 | Refills: 5 | Status: COMPLETED | COMMUNITY
Start: 2018-06-15 | End: 2022-11-21

## 2022-11-22 ENCOUNTER — APPOINTMENT (OUTPATIENT)
Dept: SURGERY | Facility: CLINIC | Age: 47
End: 2022-11-22

## 2022-11-22 ENCOUNTER — APPOINTMENT (OUTPATIENT)
Dept: TRANSGENDER CARE | Facility: CLINIC | Age: 47
End: 2022-11-22

## 2022-11-22 VITALS
DIASTOLIC BLOOD PRESSURE: 84 MMHG | HEART RATE: 98 BPM | TEMPERATURE: 98 F | WEIGHT: 260 LBS | OXYGEN SATURATION: 97 % | SYSTOLIC BLOOD PRESSURE: 110 MMHG | BODY MASS INDEX: 34.46 KG/M2 | HEIGHT: 73 IN

## 2022-11-22 PROBLEM — Z03.89 OBSERVATION FOR SUSPECTED MEDICAL CONDITIONS: Status: ACTIVE | Noted: 2017-07-12

## 2022-11-22 PROBLEM — Z85.828 HISTORY OF SQUAMOUS CELL CARCINOMA, FACE: Status: RESOLVED | Noted: 2018-06-15 | Resolved: 2022-11-22

## 2022-11-22 LAB
ALBUMIN SERPL ELPH-MCNC: 4.3 G/DL
ALP BLD-CCNC: 69 U/L
ALT SERPL-CCNC: 41 U/L
ANION GAP SERPL CALC-SCNC: 10 MMOL/L
APPEARANCE: CLEAR
AST SERPL-CCNC: 45 U/L
BACTERIA: NEGATIVE
BASOPHILS # BLD AUTO: 0.02 K/UL
BASOPHILS NFR BLD AUTO: 0.4 %
BILIRUB SERPL-MCNC: 0.3 MG/DL
BILIRUBIN URINE: NEGATIVE
BLOOD URINE: NEGATIVE
BUN SERPL-MCNC: 18 MG/DL
CALCIUM SERPL-MCNC: 9.7 MG/DL
CHLORIDE SERPL-SCNC: 104 MMOL/L
CHOLEST SERPL-MCNC: 230 MG/DL
CO2 SERPL-SCNC: 25 MMOL/L
COLOR: YELLOW
CREAT SERPL-MCNC: 1.46 MG/DL
EGFR: 60 ML/MIN/1.73M2
EOSINOPHIL # BLD AUTO: 0.03 K/UL
EOSINOPHIL NFR BLD AUTO: 0.6 %
GLUCOSE QUALITATIVE U: NEGATIVE
GLUCOSE SERPL-MCNC: 122 MG/DL
HCT VFR BLD CALC: 50.1 %
HDLC SERPL-MCNC: 32 MG/DL
HGB BLD-MCNC: 15.5 G/DL
HYALINE CASTS: 0 /LPF
IMM GRANULOCYTES NFR BLD AUTO: 0.2 %
KETONES URINE: NEGATIVE
LDLC SERPL CALC-MCNC: 178 MG/DL
LEUKOCYTE ESTERASE URINE: NEGATIVE
LYMPHOCYTES # BLD AUTO: 1.56 K/UL
LYMPHOCYTES NFR BLD AUTO: 31.5 %
MAN DIFF?: NORMAL
MCHC RBC-ENTMCNC: 25.1 PG
MCHC RBC-ENTMCNC: 30.9 GM/DL
MCV RBC AUTO: 81.1 FL
MICROSCOPIC-UA: NORMAL
MONOCYTES # BLD AUTO: 0.4 K/UL
MONOCYTES NFR BLD AUTO: 8.1 %
NEUTROPHILS # BLD AUTO: 2.93 K/UL
NEUTROPHILS NFR BLD AUTO: 59.2 %
NITRITE URINE: NEGATIVE
NONHDLC SERPL-MCNC: 198 MG/DL
PH URINE: 7
PLATELET # BLD AUTO: 263 K/UL
POTASSIUM SERPL-SCNC: 4.7 MMOL/L
PROT SERPL-MCNC: 6.7 G/DL
PROTEIN URINE: NEGATIVE
RBC # BLD: 6.18 M/UL
RBC # FLD: 18.4 %
RED BLOOD CELLS URINE: 1 /HPF
SODIUM SERPL-SCNC: 139 MMOL/L
SPECIFIC GRAVITY URINE: 1.01
SQUAMOUS EPITHELIAL CELLS: 0 /HPF
TRIGL SERPL-MCNC: 103 MG/DL
UROBILINOGEN URINE: NORMAL
WBC # FLD AUTO: 4.95 K/UL
WHITE BLOOD CELLS URINE: 0 /HPF

## 2022-11-22 PROCEDURE — 99214 OFFICE O/P EST MOD 30 MIN: CPT

## 2022-11-22 RX ORDER — TESTOSTERONE UNDECANOATE 158 MG/1
158 CAPSULE, LIQUID FILLED ORAL
Qty: 60 | Refills: 0 | Status: COMPLETED | COMMUNITY
Start: 2021-11-10 | End: 2022-11-22

## 2022-11-22 NOTE — HISTORY OF PRESENT ILLNESS
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  denies pain, bleeding, dysphagia, hoarseness or new lesions.  . last thyroid sonogram unchanged.   continues Synthroid 112 mcg 7 1/2 doses weekly.  no changes medically since last visit. I have reviewed all old and new data and available images.   still undergoing Botox injections  - not having much success and is now looking into HBO.

## 2022-11-22 NOTE — PHYSICAL EXAM
[Alert] : alert [Well Nourished] : well nourished [Healthy Appearance] : healthy appearance [No Acute Distress] : no acute distress [Well Developed] : well developed [No Proptosis] : no proptosis [No Respiratory Distress] : no respiratory distress [No Accessory Muscle Use] : no accessory muscle use [No Edema] : no peripheral edema [No Stigmata of Cushings Syndrome] : no stigmata of Cushings Syndrome [No Clubbing, Cyanosis] : no clubbing  or cyanosis of the fingernails [Normal Strength/Tone] : muscle strength and tone were normal [No Motor Deficits] : the motor exam was normal [Oriented x3] : oriented to person, place, and time [Normal Affect] : the affect was normal [Normal Mood] : the mood was normal [Normal Hearing] : hearing was normal [Clear to Auscultation] : lungs were clear to auscultation bilaterally [Normal S1, S2] : normal S1 and S2 [Normal Rate] : heart rate was normal [Regular Rhythm] : with a regular rhythm [Normal Bowel Sounds] : normal bowel sounds [Not Tender] : non-tender [Soft] : abdomen soft [Kyphosis] : no kyphosis present [Acanthosis Nigricans] : no acanthosis nigricans [de-identified] : +scar tissue in the neck

## 2022-11-22 NOTE — PHYSICAL EXAM
[de-identified] : changes from prior surgery [de-identified] : no palpable thyroid nodules [Laryngoscopy Performed] : laryngoscopy was performed, see procedure section for findings [Midline] : located in midline position [Normal] : orientation to person, place, and time: normal [FreeTextEntry2] : no change in trismus.  left posterior scalp swelling overlying nuchal ridge

## 2022-11-22 NOTE — DATA REVIEWED
[FreeTextEntry1] : Labs 11/21/2022:\par \par Chol 230\par HDL 32\par \par Cr 1.46\par Hb 15.5\par \par Labs 11/16/2022:\par TSH 4.56\par CMP normal except Cr 1.54\par \par Labs 6/2022:\par TSH 3.29\par \par Labs 5/2021:\par A1c 5.4%\par \par Chol 205\par HDL 42\par \par CMP normal\par PTH 26\par Ca 9.8\par TSH 3.57\par Free T4 2.0\par \par Labs 11/9/2021:\par TSH 2.6\par \par \par Labs 11/5/2021:\par Hb 15.7\par HCT 47.9\par \par Labs 10/2021:\par Hb 16.8\par HCT 51.8\par Testosterone 94\par CMP normal\par \par Thyroid Ultrasound Report 5/26/2021:\par \par Technique: multiple real time longitudinal and transverse images were obtained using a high resolution ultrasound with a linear transducer, XLerant e 2008 model, 10-12 MHz frequencies. All measurements will be reported as longitudinal x scott-posterior x transverse. \par Comparison: Report dated 11/2019. \par \par Indication: multinodular goiter. \par \par Findings: \par The right thyroid lobe measures 4.83 x 1.33 x 1.81 cm. The left thyroid lobe measures 3.42 x 1.40 x 1.57 cm. The isthmus measures 0.34 cm. \par The right thyroid lobe has a heterogeneous parenchyma. \par The left thyroid lobe has a heterogeneous parenchyma. \par  \par In the right lower pole there is a  predominantly solid, with isoechoic solid component. It measures 0.93 x 0.99 x 0.86 cm. The nodule is round in shape and the border is smooth. The nodule has a halo. It demonstrates macrocalcifications. It has no vascularity. \par \par In the left lower pole there is a  mixed, predominantly solid, with isoechoic solid component. It measures 0.6 x 0.64 x 0.50 cm. The nodule is round in shape and the border is smooth. The nodule has a halo. It demonstrates macrocalcifications. It has peripheral and scant internal vascularity. \par \par In the right mid pole there is a  predominantly solid. It measures 1.46 x 0.93 x 0.64 cm. The nodule is ovoid in shape and the border is smooth. The nodule has a halo. It demonstrates no calcifications. It has no vascularity. \par \par In the right mid pole there is a  mixed. It measures 0.74 x 1.06 x 0.83 cm. The nodule is round in shape and the border is smooth. The nodule has a halo. It has peripheral vascularity. \par \par Labs 4/2021:\par Phos 2.3\par Ca 10.6\par No albumin checked\par Glucose 66\par BMP otherwise normal\par \par Labs 12/8/2020:\par LH 4.4\par FSH 1.6\par TSH 1.93\par Free T4 1.8\par Testosterone 189\par \par Labs 5/2020:\par TSH 3.73\par Free T4 1.7\par TPO Ab neg.\par LFTs normal\par \par Thyroid Ultrasound Report 11/18/19:\par \par Technique: multiple real time longitudinal and transverse images were obtained using a high resolution ultrasound with a linear transducer, XLerant e 2008 model, 10-12 MHz frequencies. All measurements will be reported as longitudinal x scott-posterior x transverse. \par Comparison: Report dated 9/2018. \par \par Indication: multinodular goiter. \par \par Findings: \par The right thyroid lobe measures 3.94 x 1.34 x 1.64 cm. The left thyroid lobe measures 3.51 x 1.44 x 1.66 cm. The isthmus measures 0.26 cm. \par The right thyroid lobe has a heterogeneous parenchyma. \par  \par In the right lower pole there is a  predominantly solid, with isoechoic solid component. It measures 0.92 x 0.95 x 0.92 cm. The nodule is round in shape and the border is smooth. The nodule has a halo. It demonstrates macrocalcifications. It has no vascularity. \par \par In the left lower pole there is a  mixed, predominantly solid, with isoechoic solid component. It measures 0.72 x 0.78 x 0.51 cm. The nodule is round in shape and the border is smooth. The nodule has a halo. It demonstrates macrocalcifications. It has peripheral and scant internal vascularity. \par \par In the right mid pole there is a  predominantly solid. It measures 0.91 x 0.76 x 0.89 cm. The nodule is ovoid in shape and the border is smooth. The nodule has a halo. It demonstrates no calcifications. It has no vascularity. \par \par In the right mid pole there is a  mixed. It measures 0.74 x 0.58 x 0.74 cm. The nodule is round in shape and the border is smooth. The nodule has a halo. It has peripheral vascularity. \par \par Labs 11/2019:\par CBC normal\par CMP normal except glucose of 68 and mildly elevated AST ALT\par \par HDL 49\par Chol 209\par Trig 166\par \par Labs 7/15/19:\par A1c 5.8%\par CMP normal\par TSH 3.46\par Free T4 1.8\par Testosterone 198\par LH 4.0\par FSH 1.5\par \par Labs 1/2019:\par TSH 4.18\par Free T4 1.8\par A1c 5.7%\par CMP normal\par Vit D 32\par \par Labs 11/28/18:\par Cr 1.35\par CMP normal\par \par HDL 47\par Chol 193\par Trig 114\par \par Thyroid Ultrasound Report 9/18/18:\par \par Technique: multiple real time longitudinal and transverse images were obtained using a high resolution ultrasound with a linear transducer, [employer] e 2008 model, 10-12 MHz frequencies. All measurements will be reported as longitudinal x scott-posterior x transverse. \par Comparison: Report dated 10/2017. \par \par Indication: multinodular goiter. \par \par Findings: \par The right thyroid lobe measures 3.23 x 1.98 x 1.40 cm. The left thyroid lobe measures 3.51 x 1.44 x 1.66 cm. The isthmus measures 0.31 cm. \par The right thyroid lobe has a heterogeneous parenchyma. \par The left thyroid lobe has a heterogeneous parenchyma . \par  \par In the right lower pole there is a  predominantly solid, with isoechoic solid component. It measures 1.06 x 0.83 x 0.94 cm. The nodule is round in shape and the border is smooth. The nodule has a halo. It demonstrates macrocalcifications. It has peripheral and scant internal vascularity. \par \par In the left lower pole there is a  mixed, predominantly solid, with isoechoic solid component. It measures 1.05 x 0.74 x 0.84 cm. The nodule is round in shape and the border is smooth. The nodule has a halo. It demonstrates macrocalcifications. It has peripheral and scant internal vascularity. \par \par In the right mid pole there is a  predominantly solid. It measures 1.11 x 0.70 x 0.81 cm. The nodule is ovoid in shape and the border is smooth. The nodule has a halo. It demonstrates no calcifications. It has peripheral vascularity. \par \par In the right mid pole there is a  mixed. It measures 0.83 x 0.82 x 1.05 cm. The nodule is round in shape and the border is smooth. The nodule has a halo. It has peripheral vascularity. \par \par \par Labs 4/2018:\par TSH 4.31\par Free T4 1.4\par \par Labs 12/2017\par Hb 17.7\par CMP normal\par \par HDL 44\par Chol 241\par Trig 77\par \par Labs 10/2017:\par Cortisol 8.4\par TSH 4.86\par Free T4 1.4\par ACTH 18\par Prolactin 14.7\par FSH <0.1\par LH < 0.1\par IGF-1 150\par Testosterone 64.4\par \par \par Thyroid Ultrasound Report 10/2017\par \par Technique: multiple real time longitudinal and transverse images were obtained using a high resolution ultrasound with a linear transducer, XLerant e 2008 model, 10-12 MHz frequencies. All measurements will be reported as longitudinal x scott-posterior x transverse. \par Comparison: Report dated 10/2016. \par \par Indication: multinodular goiter. \par \par Findings: \par The right thyroid lobe measures 2.53 x 1.38 x 1.34 cm. The left thyroid lobe measures 3.41 x 1.18 x 0.99 cm. The isthmus measures 0.26 cm. \par The right thyroid lobe has a heterogeneous parenchyma. \par The left thyroid lobe has a heterogeneous parenchyma . \par  \par In the right lower pole there is a  predominantly solid, with isoechoic solid component. It measures 1.45 x 0.87 x 0.87 cm. The nodule is round in shape and the border is smooth. The nodule has a halo. It demonstrates macrocalcifications. It has peripheral and scant internal vascularity. \par \par In the left lower pole there is a  mixed, predominantly solid, with isoechoic solid component. It measures 0.66 x 0.75 x 0.85 cm. The nodule is round in shape and the border is smooth. The nodule has a halo. It demonstrates macrocalcifications. It has peripheral and scant internal vascularity. \par Labs 1/11/17:\par \par HDL 40\par Chol 227\par Trig 98\par Creatinine 1.36\par CMP otherwise normal except AST of 51\par TSH 3.70\par \par Pathology Right Lower Pole Thyroid Nodule 10/2016: Benign\par \par Thyroid Ultrasound Report 10/18/16:\par \par Technique: multiple real time longitudinal and transverse images were obtained using a high resolution ultrasound with a linear transducer, XLerant e 2008 model, 10-12 MHz frequencies. All measurements will be reported as longitudinal x scott-posterior x transverse. \par Comparison: Report dated 4/2016. \par \par Indication: multinodular goiter. \par \par Findings: \par The right thyroid lobe measures 2.61 x 1.90 x 1.54 cm. The left thyroid lobe measures 3.1 x 1.3 x 1.26 cm. The isthmus measures 0.29 cm. \par The right thyroid lobe has a heterogeneous parenchyma. \par The left thyroid lobe has a heterogeneous parenchyma . \par  \par In the right lower pole there is a  predominantly solid, with isoechoic solid component. It measures 1.56 x 0.9 x 1.2 cm. The nodule is round in shape and the border is smooth. The nodule has a halo. It demonstrates macrocalcifications. It has peripheral and scant internal vascularity. \par \par In the left lower pole there is a  mixed, predominantly solid, with isoechoic solid component. It measures 1.05 x 0.97 x 1.01 cm. The nodule is round in shape and the border is smooth. The nodule has a halo. It demonstrates macrocalcifications. It has peripheral and scant internal vascularity. \par \par Labs 7/2016:\par TSH 8.45\par Free T4 1.3\par TPO Ab neg.\par \par FNA Right Lower 4/2016: Benign\par \par Labs 3/29/16:\par TSH 7.08\par Free T4 1.4\par Anti-TPO Ab negative\par \par Thyroid Ultrasound Report 3/29/16:\par \par Technique: multiple real time longitudinal and transverse images were obtained using a high resolution ultrasound with a linear transducer, XLerant e 2008 model, 10-12 MHz frequencies. All measurements will be reported as longitudinal x scott-posterior x transverse. \par Comparison: None. \par \par Indication: thyroid nodule. \par \par Findings: \par The right thyroid lobe measures 2.37 x 1.44 x 1.62 cm. The left thyroid lobe measures 2.11 x 1.18 x 1.39 cm. The isthmus measures 0.32 cm. \par The right thyroid lobe has a heterogeneous parenchyma. \par The left thyroid lobe has a heterogeneous parenchyma . \par  \par In the right lower pole there is a  predominantly solid, with isoechoic solid component. It measures 0.87 x 0.89 x 0.94 cm. The nodule is round in shape and the border is smooth. The nodule has a halo. It demonstrates macrocalcifications. It has peripheral and scant internal vascularity. \par \par In the right lower pole there is a  predominantly solid, with isoechoic solid component. It measures 1.05 x 0.97 x 1.01 cm. The nodule is round in shape and the border is smooth. The nodule has a halo. It demonstrates macrocalcifications. It has peripheral and scant internal vascularity. \par

## 2022-11-22 NOTE — ASSESSMENT
[FreeTextEntry1] : 46 y/o M w/multinodular goiter on Synthroid for radiation induced hypothyroidism and hypogonadism here for follow up.\par \par 1. Multinodular Goiter- Thyroid U/S FNA  performed 10/2016 for increased nodule size- results revealed multinodular goiter. Repeat thyroid US performed 10/2017, 9/2018, 9/2019, 5/2021 with stable nodules (see results section for full description). Repeat thyroid US 5/2023. \par \par 2. Hypothyroidism- started on levothyroxine. Now on Synthroid 112 mcg. Clinically and chemically euthyroid. C/w Synthroid 112 mcg will adjust as needed. If TSH above goal would recommend 112 mcg daily with extra 1/2 tab on Sundays as 125 mcg was too high of a dose for him in the past with Free T4 of 2.0 (5/2021). \par \par 3. Hypogonadism- s/p HCG with trial of Clomid. Likely due to hx of anabolic steroid abuse in the past vs. radiation induced. Now on testosterone injections 0.5ml every week. Last dose >7 days due to being away form home. Tried Jatenzo for improved adherence and stability in levels, but was not covered. No need for Arimidex, would recommend lowering testosterone if needed to avoid hyperestrogenemia.  \par \par 5. Hypercalcemia- resolved on last lab work. \par \par

## 2022-11-22 NOTE — HISTORY OF PRESENT ILLNESS
[FreeTextEntry1] : 46 y/o M diagnosed with thyroid nodules in 2013 which were found incidentally on MRI which was performed for facial pain s/p surgery for squamous cell cancer, also with possible facial nerve damage as he has been experiencing frequent facial spasms unrelieved by Botox. Seen initially by me 3/29/16 with thyroid U/S confirming 2 lower pole right thyroid nodules with microcalcifications largest 1.1cm. s/p FNA 10/2016 with benign findings. Last thyroid US 5/2021 stable. Chemically with subclinical hypothyroidism at that time possibly secondary to radiation exposure from neck cancer so was started on levothyroxine 50 mcg daily. Dose increased to 88 mcg 7/2016. Dose again increased to 100 mcg after TSH found to be mildly elevated at 5.3 (10/2016). Repeat TFTs 1/2017 improved with TSH of 3.7.  TSH 4.86 dose increased to 112 mcg (10/2017). Repeat TSH 4.18 (1/2019) and remained chemically euthyroid until 5/2021 when Free T4 was 2.0 so dose was lowered back to 112 mcg. Takes Synthroid daily in am on empty stomach, eats breakfast 1 hour later. Denies missing doses. Denies current fevers, chills, night sweats, chest pain, SOB, abdominal pain, nausea, vomiting, diarrhea, constipation. Reports chronic dry skin on hands, but no recent changes. Reports heat and cold intolerance - unable to tolerate extremes in weather. Reports chronic dysphagia and chronic neck pain. +occasional palpitations especially at night. Had been exposed to [facility] site. Has a history of radiation to the neck for treatment for squamous cell cancer - last RT August 2016. +weight gain with less activity.\par \par Of note, patient states that he follows with a urologist for history of nephrolithiasis; had his testosterone levels check for complaint of low energy and found to have low level. Underwent MRI head for further evaluation, was told that his pituitary was affected by prior RT, but also has a hx of anabolic steroid use. Was started on HCG injections weekly at the end of March 2017. Was on testosterone injection 200 mg every 2 weeks. Not looking to have children at this time. Has noticed increased mood swings. Recommended trial of Clomid 9/2018 with testosterone level of 230.9. Was taking 25 mg every other day. Had not noticed change in libido. Has been off Clomid since 1/2019 as insurance would not cover it. Also with persistent fatigue and low energy. Treated for sleep apnea with CPAP at night, but occasionally nonadherent. Testosterone level off replacement was low at 94. Now started back on testosterone injections 0.5ml weekly. Last dose >7 days ago due to being away on a trip. Feels better on the testosterone injections, but would prefer alternative method such as oral testosterone or better adherence and stability in levels. Jatenzo was not covered. Urologist recommended Anastazole for elevated estrogen level on testosterone replacement. Stopped taking for the past 1 week due to cramping in muscles.

## 2022-11-22 NOTE — PROCEDURE
[[employer] 2008 model, 10-12 MHz frequencies] : multiple real time longitudinal and transverse images were obtained using a high resolution ultrasound with a linear transducer, [employer] 2008 model, 10-12 MHz frequencies. All measurements will be reported as longitudinal x scott-posterior x transverse. [Report dated ___] : Report dated [unfilled] [Multinodular Goiter] : multinodular goiter [] : a heterogeneous parenchyma  [Left Thyroid] : left [Lower] : lower pole there is a  [Isoechoic solid component] : with isoechoic solid component [Macrocalcifications] : macrocalcifications [Peripheral and scant  internal vascularity] : peripheral and scant internal vascularity [Predominantly Solid] : predominantly solid [Ovoid] : ovoid in shape [No calcifications] : no calcifications [No vascularity] : no vascularity [Right Thyroid] : right [Mid] : mid pole there is a  [Mixed] : mixed [Round] : round in shape [Smooth] : smooth [Yes] : has a halo [Peripheral vascularity] : peripheral vascularity [FreeTextEntry1] : 4.83 x 1.33 x 1.81 [FreeTextEntry5] : 3.42 x 1.40 x 1.57 [FreeTextEntry2] : 0.34 [FreeTextEntry3] : 0.74 x 1.06 x 0.83

## 2022-11-22 NOTE — ASSESSMENT
[FreeTextEntry1] : will observe. to return earlier if any change.  patient has been given the opportunity to ask questions, and all of the patient's questions have been answered to their satisfaction.  living in [place].   bloods and sonogram per dr Patel

## 2022-12-15 ENCOUNTER — TRANSCRIPTION ENCOUNTER (OUTPATIENT)
Age: 47
End: 2022-12-15

## 2022-12-17 ENCOUNTER — TRANSCRIPTION ENCOUNTER (OUTPATIENT)
Age: 47
End: 2022-12-17

## 2022-12-17 ENCOUNTER — RX RENEWAL (OUTPATIENT)
Age: 47
End: 2022-12-17

## 2022-12-19 ENCOUNTER — TRANSCRIPTION ENCOUNTER (OUTPATIENT)
Age: 47
End: 2022-12-19

## 2023-03-14 ENCOUNTER — TRANSCRIPTION ENCOUNTER (OUTPATIENT)
Age: 48
End: 2023-03-14

## 2023-03-22 DIAGNOSIS — E34.8 OTHER SPECIFIED ENDOCRINE DISORDERS: ICD-10-CM

## 2023-04-21 ENCOUNTER — TRANSCRIPTION ENCOUNTER (OUTPATIENT)
Age: 48
End: 2023-04-21

## 2023-05-21 ENCOUNTER — FORM ENCOUNTER (OUTPATIENT)
Age: 48
End: 2023-05-21

## 2023-05-25 ENCOUNTER — FORM ENCOUNTER (OUTPATIENT)
Age: 48
End: 2023-05-25

## 2023-06-01 ENCOUNTER — APPOINTMENT (OUTPATIENT)
Dept: SURGERY | Facility: CLINIC | Age: 48
End: 2023-06-01
Payer: COMMERCIAL

## 2023-06-01 PROCEDURE — 99214 OFFICE O/P EST MOD 30 MIN: CPT

## 2023-06-01 NOTE — PHYSICAL EXAM
[de-identified] : changes from prior surgery [de-identified] : no palpable thyroid nodules [Laryngoscopy Performed] : laryngoscopy was performed, see procedure section for findings [Midline] : located in midline position [Normal] : orientation to person, place, and time: normal [FreeTextEntry2] : no change in trismus.

## 2023-06-01 NOTE — REVIEW OF SYSTEMS
Oriented - self; Oriented - place; Oriented - time [As Noted in HPI] : as noted in HPI [Negative] : Heme/Lymph

## 2023-06-01 NOTE — HISTORY OF PRESENT ILLNESS
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  denies pain, bleeding, dysphagia, hoarseness or new lesions.  . last thyroid sonogram unchanged.   continues Synthroid 112 mcg daily.  no changes medically since last visit with exception of needing procedure for eustachian tube dysfunction.  . I have reviewed all old and new data and available images.  looking into stem cell treatment.  recent TFTs normal.  Additional information was obtained from others present at the time of visit to ensure the completeness of the history

## 2023-06-06 ENCOUNTER — APPOINTMENT (OUTPATIENT)
Dept: TRANSGENDER CARE | Facility: CLINIC | Age: 48
End: 2023-06-06
Payer: COMMERCIAL

## 2023-06-06 VITALS
BODY MASS INDEX: 33.93 KG/M2 | SYSTOLIC BLOOD PRESSURE: 122 MMHG | TEMPERATURE: 98.2 F | DIASTOLIC BLOOD PRESSURE: 80 MMHG | HEIGHT: 73 IN | WEIGHT: 256 LBS | HEART RATE: 77 BPM | RESPIRATION RATE: 18 BRPM | OXYGEN SATURATION: 98 %

## 2023-06-06 PROCEDURE — 99214 OFFICE O/P EST MOD 30 MIN: CPT

## 2023-06-06 NOTE — DATA REVIEWED
[FreeTextEntry1] : Labs 4/2023:\par Chol 194\par HDL 55\par TG 60\par \par CMP normal\par Prolactin 5.0\par SHBG 15\par Testosterone 62\par LH 0.7\par FSH 2.1\par Estradiol <15\par HCG neg.\par \par Labs 11/21/2022:\par \par Chol 230\par HDL 32\par \par Cr 1.46\par Hb 15.5\par \par Labs 11/16/2022:\par TSH 4.56\par CMP normal except Cr 1.54\par \par Labs 6/2022:\par TSH 3.29\par \par Labs 5/2021:\par A1c 5.4%\par \par Chol 205\par HDL 42\par \par CMP normal\par PTH 26\par Ca 9.8\par TSH 3.57\par Free T4 2.0\par \par Labs 11/9/2021:\par TSH 2.6\par \par \par Labs 11/5/2021:\par Hb 15.7\par HCT 47.9\par \par Labs 10/2021:\par Hb 16.8\par HCT 51.8\par Testosterone 94\par CMP normal\par \par Thyroid Ultrasound Report 5/26/2021:\par \par Technique: multiple real time longitudinal and transverse images were obtained using a high resolution ultrasound with a linear transducer, [employer] e 2008 model, 10-12 MHz frequencies. All measurements will be reported as longitudinal x scott-posterior x transverse. \par Comparison: Report dated 11/2019. \par \par Indication: multinodular goiter. \par \par Findings: \par The right thyroid lobe measures 4.83 x 1.33 x 1.81 cm. The left thyroid lobe measures 3.42 x 1.40 x 1.57 cm. The isthmus measures 0.34 cm. \par The right thyroid lobe has a heterogeneous parenchyma. \par The left thyroid lobe has a heterogeneous parenchyma. \par  \par In the right lower pole there is a  predominantly solid, with isoechoic solid component. It measures 0.93 x 0.99 x 0.86 cm. The nodule is round in shape and the border is smooth. The nodule has a halo. It demonstrates macrocalcifications. It has no vascularity. \par \par In the left lower pole there is a  mixed, predominantly solid, with isoechoic solid component. It measures 0.6 x 0.64 x 0.50 cm. The nodule is round in shape and the border is smooth. The nodule has a halo. It demonstrates macrocalcifications. It has peripheral and scant internal vascularity. \par \par In the right mid pole there is a  predominantly solid. It measures 1.46 x 0.93 x 0.64 cm. The nodule is ovoid in shape and the border is smooth. The nodule has a halo. It demonstrates no calcifications. It has no vascularity. \par \par In the right mid pole there is a  mixed. It measures 0.74 x 1.06 x 0.83 cm. The nodule is round in shape and the border is smooth. The nodule has a halo. It has peripheral vascularity. \par \par Labs 4/2021:\par Phos 2.3\par Ca 10.6\par No albumin checked\par Glucose 66\par BMP otherwise normal\par \par Labs 12/8/2020:\par LH 4.4\par FSH 1.6\par TSH 1.93\par Free T4 1.8\par Testosterone 189\par \par Labs 5/2020:\par TSH 3.73\par Free T4 1.7\par TPO Ab neg.\par LFTs normal\par \par Thyroid Ultrasound Report 11/18/19:\par \par Technique: multiple real time longitudinal and transverse images were obtained using a high resolution ultrasound with a linear transducer, [employer] e 2008 model, 10-12 MHz frequencies. All measurements will be reported as longitudinal x scott-posterior x transverse. \par Comparison: Report dated 9/2018. \par \par Indication: multinodular goiter. \par \par Findings: \par The right thyroid lobe measures 3.94 x 1.34 x 1.64 cm. The left thyroid lobe measures 3.51 x 1.44 x 1.66 cm. The isthmus measures 0.26 cm. \par The right thyroid lobe has a heterogeneous parenchyma. \par  \par In the right lower pole there is a  predominantly solid, with isoechoic solid component. It measures 0.92 x 0.95 x 0.92 cm. The nodule is round in shape and the border is smooth. The nodule has a halo. It demonstrates macrocalcifications. It has no vascularity. \par \par In the left lower pole there is a  mixed, predominantly solid, with isoechoic solid component. It measures 0.72 x 0.78 x 0.51 cm. The nodule is round in shape and the border is smooth. The nodule has a halo. It demonstrates macrocalcifications. It has peripheral and scant internal vascularity. \par \par In the right mid pole there is a  predominantly solid. It measures 0.91 x 0.76 x 0.89 cm. The nodule is ovoid in shape and the border is smooth. The nodule has a halo. It demonstrates no calcifications. It has no vascularity. \par \par In the right mid pole there is a  mixed. It measures 0.74 x 0.58 x 0.74 cm. The nodule is round in shape and the border is smooth. The nodule has a halo. It has peripheral vascularity. \par \par Labs 11/2019:\par CBC normal\par CMP normal except glucose of 68 and mildly elevated AST ALT\par \par HDL 49\par Chol 209\par Trig 166\par \par Labs 7/15/19:\par A1c 5.8%\par CMP normal\par TSH 3.46\par Free T4 1.8\par Testosterone 198\par LH 4.0\par FSH 1.5\par \par Labs 1/2019:\par TSH 4.18\par Free T4 1.8\par A1c 5.7%\par CMP normal\par Vit D 32\par \par Labs 11/28/18:\par Cr 1.35\par CMP normal\par \par HDL 47\par Chol 193\par Trig 114\par \par Thyroid Ultrasound Report 9/18/18:\par \par Technique: multiple real time longitudinal and transverse images were obtained using a high resolution ultrasound with a linear transducer, [employer] e 2008 model, 10-12 MHz frequencies. All measurements will be reported as longitudinal x scott-posterior x transverse. \par Comparison: Report dated 10/2017. \par \par Indication: multinodular goiter. \par \par Findings: \par The right thyroid lobe measures 3.23 x 1.98 x 1.40 cm. The left thyroid lobe measures 3.51 x 1.44 x 1.66 cm. The isthmus measures 0.31 cm. \par The right thyroid lobe has a heterogeneous parenchyma. \par The left thyroid lobe has a heterogeneous parenchyma . \par  \par In the right lower pole there is a  predominantly solid, with isoechoic solid component. It measures 1.06 x 0.83 x 0.94 cm. The nodule is round in shape and the border is smooth. The nodule has a halo. It demonstrates macrocalcifications. It has peripheral and scant internal vascularity. \par \par In the left lower pole there is a  mixed, predominantly solid, with isoechoic solid component. It measures 1.05 x 0.74 x 0.84 cm. The nodule is round in shape and the border is smooth. The nodule has a halo. It demonstrates macrocalcifications. It has peripheral and scant internal vascularity. \par \par In the right mid pole there is a  predominantly solid. It measures 1.11 x 0.70 x 0.81 cm. The nodule is ovoid in shape and the border is smooth. The nodule has a halo. It demonstrates no calcifications. It has peripheral vascularity. \par \par In the right mid pole there is a  mixed. It measures 0.83 x 0.82 x 1.05 cm. The nodule is round in shape and the border is smooth. The nodule has a halo. It has peripheral vascularity. \par \par \par Labs 4/2018:\par TSH 4.31\par Free T4 1.4\par \par Labs 12/2017\par Hb 17.7\par CMP normal\par \par HDL 44\par Chol 241\par Trig 77\par \par Labs 10/2017:\par Cortisol 8.4\par TSH 4.86\par Free T4 1.4\par ACTH 18\par Prolactin 14.7\par FSH <0.1\par LH < 0.1\par IGF-1 150\par Testosterone 64.4\par \par \par Thyroid Ultrasound Report 10/2017\par \par Technique: multiple real time longitudinal and transverse images were obtained using a high resolution ultrasound with a linear transducer, [employer] e 2008 model, 10-12 MHz frequencies. All measurements will be reported as longitudinal x scott-posterior x transverse. \par Comparison: Report dated 10/2016. \par \par Indication: multinodular goiter. \par \par Findings: \par The right thyroid lobe measures 2.53 x 1.38 x 1.34 cm. The left thyroid lobe measures 3.41 x 1.18 x 0.99 cm. The isthmus measures 0.26 cm. \par The right thyroid lobe has a heterogeneous parenchyma. \par The left thyroid lobe has a heterogeneous parenchyma . \par  \par In the right lower pole there is a  predominantly solid, with isoechoic solid component. It measures 1.45 x 0.87 x 0.87 cm. The nodule is round in shape and the border is smooth. The nodule has a halo. It demonstrates macrocalcifications. It has peripheral and scant internal vascularity. \par \par In the left lower pole there is a  mixed, predominantly solid, with isoechoic solid component. It measures 0.66 x 0.75 x 0.85 cm. The nodule is round in shape and the border is smooth. The nodule has a halo. It demonstrates macrocalcifications. It has peripheral and scant internal vascularity. \par Labs 1/11/17:\par \par HDL 40\par Chol 227\par Trig 98\par Creatinine 1.36\par CMP otherwise normal except AST of 51\par TSH 3.70\par \par Pathology Right Lower Pole Thyroid Nodule 10/2016: Benign\par \par Thyroid Ultrasound Report 10/18/16:\par \par Technique: multiple real time longitudinal and transverse images were obtained using a high resolution ultrasound with a linear transducer, [employer] e 2008 model, 10-12 MHz frequencies. All measurements will be reported as longitudinal x scott-posterior x transverse. \par Comparison: Report dated 4/2016. \par \par Indication: multinodular goiter. \par \par Findings: \par The right thyroid lobe measures 2.61 x 1.90 x 1.54 cm. The left thyroid lobe measures 3.1 x 1.3 x 1.26 cm. The isthmus measures 0.29 cm. \par The right thyroid lobe has a heterogeneous parenchyma. \par The left thyroid lobe has a heterogeneous parenchyma . \par  \par In the right lower pole there is a  predominantly solid, with isoechoic solid component. It measures 1.56 x 0.9 x 1.2 cm. The nodule is round in shape and the border is smooth. The nodule has a halo. It demonstrates macrocalcifications. It has peripheral and scant internal vascularity. \par \par In the left lower pole there is a  mixed, predominantly solid, with isoechoic solid component. It measures 1.05 x 0.97 x 1.01 cm. The nodule is round in shape and the border is smooth. The nodule has a halo. It demonstrates macrocalcifications. It has peripheral and scant internal vascularity. \par \par Labs 7/2016:\par TSH 8.45\par Free T4 1.3\par TPO Ab neg.\par \par FNA Right Lower 4/2016: Benign\par \par Labs 3/29/16:\par TSH 7.08\par Free T4 1.4\par Anti-TPO Ab negative\par \par Thyroid Ultrasound Report 3/29/16:\par \par Technique: multiple real time longitudinal and transverse images were obtained using a high resolution ultrasound with a linear transducer, [employer] e 2008 model, 10-12 MHz frequencies. All measurements will be reported as longitudinal x scott-posterior x transverse. \par Comparison: None. \par \par Indication: thyroid nodule. \par \par Findings: \par The right thyroid lobe measures 2.37 x 1.44 x 1.62 cm. The left thyroid lobe measures 2.11 x 1.18 x 1.39 cm. The isthmus measures 0.32 cm. \par The right thyroid lobe has a heterogeneous parenchyma. \par The left thyroid lobe has a heterogeneous parenchyma . \par  \par In the right lower pole there is a  predominantly solid, with isoechoic solid component. It measures 0.87 x 0.89 x 0.94 cm. The nodule is round in shape and the border is smooth. The nodule has a halo. It demonstrates macrocalcifications. It has peripheral and scant internal vascularity. \par \par In the right lower pole there is a  predominantly solid, with isoechoic solid component. It measures 1.05 x 0.97 x 1.01 cm. The nodule is round in shape and the border is smooth. The nodule has a halo. It demonstrates macrocalcifications. It has peripheral and scant internal vascularity. \par

## 2023-06-06 NOTE — ASSESSMENT
[FreeTextEntry1] : 46 y/o M w/multinodular goiter on Synthroid for radiation induced hypothyroidism and hypogonadism here for follow up.\par \par 1. Multinodular Goiter- Thyroid U/S FNA  performed 10/2016 for increased nodule size- results revealed multinodular goiter. Repeat thyroid US performed 10/2017, 9/2018, 9/2019, 5/2021 with stable nodules (see results section for full description). Repeat thyroid US this week. Referral given.\par \par 2. Hypothyroidism- started on levothyroxine. Now on Synthroid 112 mcg. Clinically and chemically euthyroid. C/w Synthroid 112 mcg will adjust as needed. If TSH above goal would recommend 112 mcg daily with extra 1/2 tab on Sundays as 125 mcg was too high of a dose for him in the past with Free T4 of 2.0 (5/2021). \par \par 3. Hypogonadism- s/p HCG with trial of Clomid. Likely due to hx of anabolic steroid abuse in the past vs. radiation induced. Now on testosterone injections 0.5ml every week. Would avoid higher doses due to hyperestrogenemia with aromatization. Tried Jatenzo for improved adherence and stability in levels, but was not covered. No need for Arimidex, would recommend lowering testosterone if needed to avoid hyperestrogenemia. Consider Testopel. \par \par 5. Hypercalcemia- resolved on repeat lab work. Continue to monitor. \par \par

## 2023-06-06 NOTE — PHYSICAL EXAM
[Alert] : alert [Well Nourished] : well nourished [Healthy Appearance] : healthy appearance [No Acute Distress] : no acute distress [Well Developed] : well developed [No Proptosis] : no proptosis [Normal Hearing] : hearing was normal [No Respiratory Distress] : no respiratory distress [No Accessory Muscle Use] : no accessory muscle use [Clear to Auscultation] : lungs were clear to auscultation bilaterally [Normal S1, S2] : normal S1 and S2 [Normal Rate] : heart rate was normal [Regular Rhythm] : with a regular rhythm [No Edema] : no peripheral edema [Normal Bowel Sounds] : normal bowel sounds [Not Tender] : non-tender [Soft] : abdomen soft [No Stigmata of Cushings Syndrome] : no stigmata of Cushings Syndrome [No Clubbing, Cyanosis] : no clubbing  or cyanosis of the fingernails [Normal Strength/Tone] : muscle strength and tone were normal [No Motor Deficits] : the motor exam was normal [Oriented x3] : oriented to person, place, and time [Normal Affect] : the affect was normal [Normal Mood] : the mood was normal [Kyphosis] : no kyphosis present [Acanthosis Nigricans] : no acanthosis nigricans [de-identified] : +scar tissue in the neck

## 2023-06-06 NOTE — HISTORY OF PRESENT ILLNESS
[FreeTextEntry1] : 48 y/o M diagnosed with thyroid nodules in 2013 which were found incidentally on MRI which was performed for facial pain s/p surgery for squamous cell cancer, also with possible facial nerve damage as he has been experiencing frequent facial spasms unrelieved by Botox. Seen initially by me 3/29/16 with thyroid U/S confirming 2 lower pole right thyroid nodules with microcalcifications largest 1.1cm. s/p FNA 10/2016 with benign findings. Last thyroid US 5/2021 stable. Chemically with subclinical hypothyroidism at that time possibly secondary to radiation exposure from neck cancer so was started on levothyroxine 50 mcg daily. Dose increased to 88 mcg 7/2016. Dose again increased to 100 mcg after TSH found to be mildly elevated at 5.3 (10/2016). Repeat TFTs 1/2017 improved with TSH of 3.7.  TSH 4.86 dose increased to 112 mcg (10/2017). Repeat TSH 4.18 (1/2019) and remained chemically euthyroid until 5/2021 when Free T4 was 2.0 so dose was lowered back to 112 mcg. Takes Synthroid daily in am on empty stomach, eats breakfast 1 hour later. Denies missing doses. Denies current fevers, chills, night sweats, chest pain, SOB, abdominal pain, nausea, vomiting, diarrhea, constipation. Reports chronic dry skin on hands, but no recent changes. Reports heat and cold intolerance - unable to tolerate extremes in weather. Reports chronic dysphagia and chronic neck pain. +occasional palpitations especially at night. Had been exposed to [facility] site. Has a history of radiation to the neck for treatment for squamous cell cancer - last RT August 2016. +weight gain with less activity.\par \par Of note, patient states that he follows with a urologist for history of nephrolithiasis; had his testosterone levels check for complaint of low energy and found to have low level. Underwent MRI head for further evaluation, was told that his pituitary was affected by prior RT, but also has a hx of anabolic steroid use. Was started on HCG injections weekly at the end of March 2017. Was on testosterone injection 200 mg every 2 weeks. Not looking to have children at this time. Has noticed increased mood swings. Recommended trial of Clomid 9/2018 with testosterone level of 230.9. Was taking 25 mg every other day. Had not noticed change in libido. Has been off Clomid since 1/2019 as insurance would not cover it. Also with persistent fatigue and low energy. Treated for sleep apnea with CPAP at night, but occasionally nonadherent. Testosterone level off replacement was low at 94. Now started back on testosterone injections. Moved to [place] where urologist found him to have an elevated estrogen level. I recommended he told testosterone with reepat labs as hyperestrogenemia was likely due to aromatization of higher doses of testosterone use. Repeat levels back in the normal range. Feels better on the testosterone injections, but would prefer alternative method such as oral testosterone or better adherence and stability in levels. Jatenzo was not covered. Interested in pellets.

## 2023-06-07 ENCOUNTER — FORM ENCOUNTER (OUTPATIENT)
Age: 48
End: 2023-06-07

## 2023-06-07 ENCOUNTER — APPOINTMENT (OUTPATIENT)
Dept: ULTRASOUND IMAGING | Facility: CLINIC | Age: 48
End: 2023-06-07

## 2023-06-07 LAB
ALBUMIN SERPL ELPH-MCNC: 4.7 G/DL
ALP BLD-CCNC: 90 U/L
ALT SERPL-CCNC: 23 U/L
ANION GAP SERPL CALC-SCNC: 13 MMOL/L
AST SERPL-CCNC: 22 U/L
BILIRUB SERPL-MCNC: 0.3 MG/DL
BUN SERPL-MCNC: 21 MG/DL
CALCIUM SERPL-MCNC: 9.7 MG/DL
CHLORIDE SERPL-SCNC: 104 MMOL/L
CO2 SERPL-SCNC: 21 MMOL/L
CREAT SERPL-MCNC: 1.12 MG/DL
EGFR: 82 ML/MIN/1.73M2
ESTIMATED AVERAGE GLUCOSE: 123 MG/DL
GLUCOSE SERPL-MCNC: 87 MG/DL
HBA1C MFR BLD HPLC: 5.9 %
POTASSIUM SERPL-SCNC: 4.9 MMOL/L
PROT SERPL-MCNC: 7 G/DL
SODIUM SERPL-SCNC: 138 MMOL/L
T3 SERPL-MCNC: 109 NG/DL
T4 FREE SERPL-MCNC: 1.7 NG/DL
TESTOST SERPL-MCNC: 186 NG/DL
TSH SERPL-ACNC: 3.94 UIU/ML

## 2023-06-09 ENCOUNTER — APPOINTMENT (OUTPATIENT)
Dept: ENDOCRINOLOGY | Facility: CLINIC | Age: 48
End: 2023-06-09
Payer: COMMERCIAL

## 2023-06-09 VITALS
OXYGEN SATURATION: 97 % | WEIGHT: 250 LBS | HEART RATE: 89 BPM | DIASTOLIC BLOOD PRESSURE: 70 MMHG | HEIGHT: 73 IN | SYSTOLIC BLOOD PRESSURE: 110 MMHG | BODY MASS INDEX: 33.13 KG/M2

## 2023-06-09 VITALS — DIASTOLIC BLOOD PRESSURE: 80 MMHG | OXYGEN SATURATION: 97 % | SYSTOLIC BLOOD PRESSURE: 118 MMHG | HEART RATE: 80 BPM

## 2023-06-09 PROCEDURE — S0189: CPT

## 2023-06-09 PROCEDURE — 11980 IMPLANT HORMONE PELLET(S): CPT

## 2023-06-09 NOTE — PROCEDURE
[FreeTextEntry1] : The patient was prepped and draped in a sterile manner, 9 mL of 1% lidocaine (with epinephrine) was injected using a needle along the right lateral and upper buttocks. 10 Testopel pellets were implanted without difficulty\par \par Exp: 2/2025\par Lot: \par S/N: [id number]\par GTIN: [id number]\par \par 15 minutes after the procedure the patient was examined. Blood pressure and pulse were obtained and were at his baseline. There was minimal blood stain on the dressing. Discharge instructions were reviewed. He was discharged home in excellent condition. An appointment was made for followup in 6 weeks. He will call with any questions or concerns.\par

## 2023-07-19 ENCOUNTER — NON-APPOINTMENT (OUTPATIENT)
Age: 48
End: 2023-07-19

## 2023-07-24 ENCOUNTER — TRANSCRIPTION ENCOUNTER (OUTPATIENT)
Age: 48
End: 2023-07-24

## 2023-07-26 ENCOUNTER — APPOINTMENT (OUTPATIENT)
Dept: ENDOCRINOLOGY | Facility: CLINIC | Age: 48
End: 2023-07-26
Payer: COMMERCIAL

## 2023-07-26 PROCEDURE — 99214 OFFICE O/P EST MOD 30 MIN: CPT | Mod: 95

## 2023-07-26 NOTE — HISTORY OF PRESENT ILLNESS
[FreeTextEntry1] : 48 y/o M diagnosed with thyroid nodules in 2013 which were found incidentally on MRI which was performed for facial pain s/p surgery for squamous cell cancer, also with possible facial nerve damage as he has been experiencing frequent facial spasms unrelieved by Botox. Seen initially by me 3/29/16 with thyroid U/S confirming 2 lower pole right thyroid nodules with microcalcifications largest 1.1cm. s/p FNA 10/2016 with benign findings. Last thyroid US 5/2021 stable. Chemically with subclinical hypothyroidism at that time possibly secondary to radiation exposure from neck cancer so was started on levothyroxine 50 mcg daily. Dose increased to 88 mcg 7/2016. Dose again increased to 100 mcg after TSH found to be mildly elevated at 5.3 (10/2016). Repeat TFTs 1/2017 improved with TSH of 3.7.  TSH 4.86 dose increased to 112 mcg (10/2017). Repeat TSH 4.18 (1/2019) and remained chemically euthyroid until 5/2021 when Free T4 was 2.0 so dose was lowered back to 112 mcg. Takes Synthroid daily in am on empty stomach, eats breakfast 1 hour later. Denies missing doses. Denies current fevers, chills, night sweats, chest pain, SOB, abdominal pain, nausea, vomiting, diarrhea, constipation. Reports chronic dry skin on hands, but no recent changes. Reports heat and cold intolerance - unable to tolerate extremes in weather. Reports chronic dysphagia and chronic neck pain. +occasional palpitations especially at night. Had been exposed to [facility] site. Has a history of radiation to the neck for treatment for squamous cell cancer - last RT August 2016. +weight gain with less activity.\par \par Of note, patient states that he follows with a urologist for history of nephrolithiasis; had his testosterone levels check for complaint of low energy and found to have low level. Underwent MRI head for further evaluation, was told that his pituitary was affected by prior RT, but also has a hx of anabolic steroid use. Was started on HCG injections weekly at the end of March 2017. Was on testosterone injection 200 mg every 2 weeks. Not looking to have children at this time. Has noticed increased mood swings. Recommended trial of Clomid 9/2018 with testosterone level of 230.9. Was taking 25 mg every other day. Had not noticed change in libido. Has been off Clomid since 1/2019 as insurance would not cover it. Also with persistent fatigue and low energy. Treated for sleep apnea with CPAP at night, but occasionally nonadherent. Testosterone level off replacement was low at 94. Now started back on testosterone injections 0.5ml weekly.. Felt better on the testosterone injections, but preferred alternative method. Jatenzo was not covered. Urologist recommended Anastazole for elevated estrogen level on testosterone replacement. Stopped taking for the past 1 week due to cramping in muscles. Recommended Testopel 6/2023. Tolerated well.

## 2023-07-26 NOTE — DATA REVIEWED
[FreeTextEntry1] : Labs 7/2023:\par Testosterone approx 700\par \par Labs 6/2023:\par CMP normal\par A1c 5.9%\par Testosterone 186\par TSH 3.94\par \par Labs 11/21/2022:\par \par Chol 230\par HDL 32\par \par Cr 1.46\par Hb 15.5\par \par Labs 11/16/2022:\par TSH 4.56\par CMP normal except Cr 1.54\par \par Labs 6/2022:\par TSH 3.29\par \par Labs 5/2021:\par A1c 5.4%\par \par Chol 205\par HDL 42\par \par CMP normal\par PTH 26\par Ca 9.8\par TSH 3.57\par Free T4 2.0\par \par Labs 11/9/2021:\par TSH 2.6\par \par \par Labs 11/5/2021:\par Hb 15.7\par HCT 47.9\par \par Labs 10/2021:\par Hb 16.8\par HCT 51.8\par Testosterone 94\par CMP normal\par \par Thyroid Ultrasound Report 5/26/2021:\par \par Technique: multiple real time longitudinal and transverse images were obtained using a high resolution ultrasound with a linear transducer, [employer] e 2008 model, 10-12 MHz frequencies. All measurements will be reported as longitudinal x scott-posterior x transverse. \par Comparison: Report dated 11/2019. \par \par Indication: multinodular goiter. \par \par Findings: \par The right thyroid lobe measures 4.83 x 1.33 x 1.81 cm. The left thyroid lobe measures 3.42 x 1.40 x 1.57 cm. The isthmus measures 0.34 cm. \par The right thyroid lobe has a heterogeneous parenchyma. \par The left thyroid lobe has a heterogeneous parenchyma. \par  \par In the right lower pole there is a  predominantly solid, with isoechoic solid component. It measures 0.93 x 0.99 x 0.86 cm. The nodule is round in shape and the border is smooth. The nodule has a halo. It demonstrates macrocalcifications. It has no vascularity. \par \par In the left lower pole there is a  mixed, predominantly solid, with isoechoic solid component. It measures 0.6 x 0.64 x 0.50 cm. The nodule is round in shape and the border is smooth. The nodule has a halo. It demonstrates macrocalcifications. It has peripheral and scant internal vascularity. \par \par In the right mid pole there is a  predominantly solid. It measures 1.46 x 0.93 x 0.64 cm. The nodule is ovoid in shape and the border is smooth. The nodule has a halo. It demonstrates no calcifications. It has no vascularity. \par \par In the right mid pole there is a  mixed. It measures 0.74 x 1.06 x 0.83 cm. The nodule is round in shape and the border is smooth. The nodule has a halo. It has peripheral vascularity. \par \par Labs 4/2021:\par Phos 2.3\par Ca 10.6\par No albumin checked\par Glucose 66\par BMP otherwise normal\par \par Labs 12/8/2020:\par LH 4.4\par FSH 1.6\par TSH 1.93\par Free T4 1.8\par Testosterone 189\par \par Labs 5/2020:\par TSH 3.73\par Free T4 1.7\par TPO Ab neg.\par LFTs normal\par \par Thyroid Ultrasound Report 11/18/19:\par \par Technique: multiple real time longitudinal and transverse images were obtained using a high resolution ultrasound with a linear transducer, [employer] e 2008 model, 10-12 MHz frequencies. All measurements will be reported as longitudinal x scott-posterior x transverse. \par Comparison: Report dated 9/2018. \par \par Indication: multinodular goiter. \par \par Findings: \par The right thyroid lobe measures 3.94 x 1.34 x 1.64 cm. The left thyroid lobe measures 3.51 x 1.44 x 1.66 cm. The isthmus measures 0.26 cm. \par The right thyroid lobe has a heterogeneous parenchyma. \par  \par In the right lower pole there is a  predominantly solid, with isoechoic solid component. It measures 0.92 x 0.95 x 0.92 cm. The nodule is round in shape and the border is smooth. The nodule has a halo. It demonstrates macrocalcifications. It has no vascularity. \par \par In the left lower pole there is a  mixed, predominantly solid, with isoechoic solid component. It measures 0.72 x 0.78 x 0.51 cm. The nodule is round in shape and the border is smooth. The nodule has a halo. It demonstrates macrocalcifications. It has peripheral and scant internal vascularity. \par \par In the right mid pole there is a  predominantly solid. It measures 0.91 x 0.76 x 0.89 cm. The nodule is ovoid in shape and the border is smooth. The nodule has a halo. It demonstrates no calcifications. It has no vascularity. \par \par In the right mid pole there is a  mixed. It measures 0.74 x 0.58 x 0.74 cm. The nodule is round in shape and the border is smooth. The nodule has a halo. It has peripheral vascularity. \par \par Labs 11/2019:\par CBC normal\par CMP normal except glucose of 68 and mildly elevated AST ALT\par \par HDL 49\par Chol 209\par Trig 166\par \par Labs 7/15/19:\par A1c 5.8%\par CMP normal\par TSH 3.46\par Free T4 1.8\par Testosterone 198\par LH 4.0\par FSH 1.5\par \par Labs 1/2019:\par TSH 4.18\par Free T4 1.8\par A1c 5.7%\par CMP normal\par Vit D 32\par \par Labs 11/28/18:\par Cr 1.35\par CMP normal\par \par HDL 47\par Chol 193\par Trig 114\par \par Thyroid Ultrasound Report 9/18/18:\par \par Technique: multiple real time longitudinal and transverse images were obtained using a high resolution ultrasound with a linear transducer, [employer] e 2008 model, 10-12 MHz frequencies. All measurements will be reported as longitudinal x scott-posterior x transverse. \par Comparison: Report dated 10/2017. \par \par Indication: multinodular goiter. \par \par Findings: \par The right thyroid lobe measures 3.23 x 1.98 x 1.40 cm. The left thyroid lobe measures 3.51 x 1.44 x 1.66 cm. The isthmus measures 0.31 cm. \par The right thyroid lobe has a heterogeneous parenchyma. \par The left thyroid lobe has a heterogeneous parenchyma . \par  \par In the right lower pole there is a  predominantly solid, with isoechoic solid component. It measures 1.06 x 0.83 x 0.94 cm. The nodule is round in shape and the border is smooth. The nodule has a halo. It demonstrates macrocalcifications. It has peripheral and scant internal vascularity. \par \par In the left lower pole there is a  mixed, predominantly solid, with isoechoic solid component. It measures 1.05 x 0.74 x 0.84 cm. The nodule is round in shape and the border is smooth. The nodule has a halo. It demonstrates macrocalcifications. It has peripheral and scant internal vascularity. \par \par In the right mid pole there is a  predominantly solid. It measures 1.11 x 0.70 x 0.81 cm. The nodule is ovoid in shape and the border is smooth. The nodule has a halo. It demonstrates no calcifications. It has peripheral vascularity. \par \par In the right mid pole there is a  mixed. It measures 0.83 x 0.82 x 1.05 cm. The nodule is round in shape and the border is smooth. The nodule has a halo. It has peripheral vascularity. \par \par \par Labs 4/2018:\par TSH 4.31\par Free T4 1.4\par \par Labs 12/2017\par Hb 17.7\par CMP normal\par \par HDL 44\par Chol 241\par Trig 77\par \par Labs 10/2017:\par Cortisol 8.4\par TSH 4.86\par Free T4 1.4\par ACTH 18\par Prolactin 14.7\par FSH <0.1\par LH < 0.1\par IGF-1 150\par Testosterone 64.4\par \par \par Thyroid Ultrasound Report 10/2017\par \par Technique: multiple real time longitudinal and transverse images were obtained using a high resolution ultrasound with a linear transducer, [employer] e 2008 model, 10-12 MHz frequencies. All measurements will be reported as longitudinal x scott-posterior x transverse. \par Comparison: Report dated 10/2016. \par \par Indication: multinodular goiter. \par \par Findings: \par The right thyroid lobe measures 2.53 x 1.38 x 1.34 cm. The left thyroid lobe measures 3.41 x 1.18 x 0.99 cm. The isthmus measures 0.26 cm. \par The right thyroid lobe has a heterogeneous parenchyma. \par The left thyroid lobe has a heterogeneous parenchyma . \par  \par In the right lower pole there is a  predominantly solid, with isoechoic solid component. It measures 1.45 x 0.87 x 0.87 cm. The nodule is round in shape and the border is smooth. The nodule has a halo. It demonstrates macrocalcifications. It has peripheral and scant internal vascularity. \par \par In the left lower pole there is a  mixed, predominantly solid, with isoechoic solid component. It measures 0.66 x 0.75 x 0.85 cm. The nodule is round in shape and the border is smooth. The nodule has a halo. It demonstrates macrocalcifications. It has peripheral and scant internal vascularity. \par Labs 1/11/17:\par \par HDL 40\par Chol 227\par Trig 98\par Creatinine 1.36\par CMP otherwise normal except AST of 51\par TSH 3.70\par \par Pathology Right Lower Pole Thyroid Nodule 10/2016: Benign\par \par Thyroid Ultrasound Report 10/18/16:\par \par Technique: multiple real time longitudinal and transverse images were obtained using a high resolution ultrasound with a linear transducer, [employer] e 2008 model, 10-12 MHz frequencies. All measurements will be reported as longitudinal x scott-posterior x transverse. \par Comparison: Report dated 4/2016. \par \par Indication: multinodular goiter. \par \par Findings: \par The right thyroid lobe measures 2.61 x 1.90 x 1.54 cm. The left thyroid lobe measures 3.1 x 1.3 x 1.26 cm. The isthmus measures 0.29 cm. \par The right thyroid lobe has a heterogeneous parenchyma. \par The left thyroid lobe has a heterogeneous parenchyma . \par  \par In the right lower pole there is a  predominantly solid, with isoechoic solid component. It measures 1.56 x 0.9 x 1.2 cm. The nodule is round in shape and the border is smooth. The nodule has a halo. It demonstrates macrocalcifications. It has peripheral and scant internal vascularity. \par \par In the left lower pole there is a  mixed, predominantly solid, with isoechoic solid component. It measures 1.05 x 0.97 x 1.01 cm. The nodule is round in shape and the border is smooth. The nodule has a halo. It demonstrates macrocalcifications. It has peripheral and scant internal vascularity. \par \par Labs 7/2016:\par TSH 8.45\par Free T4 1.3\par TPO Ab neg.\par \par FNA Right Lower 4/2016: Benign\par \par Labs 3/29/16:\par TSH 7.08\par Free T4 1.4\par Anti-TPO Ab negative\par \par Thyroid Ultrasound Report 3/29/16:\par \par Technique: multiple real time longitudinal and transverse images were obtained using a high resolution ultrasound with a linear transducer, [employer] e 2008 model, 10-12 MHz frequencies. All measurements will be reported as longitudinal x scott-posterior x transverse. \par Comparison: None. \par \par Indication: thyroid nodule. \par \par Findings: \par The right thyroid lobe measures 2.37 x 1.44 x 1.62 cm. The left thyroid lobe measures 2.11 x 1.18 x 1.39 cm. The isthmus measures 0.32 cm. \par The right thyroid lobe has a heterogeneous parenchyma. \par The left thyroid lobe has a heterogeneous parenchyma . \par  \par In the right lower pole there is a  predominantly solid, with isoechoic solid component. It measures 0.87 x 0.89 x 0.94 cm. The nodule is round in shape and the border is smooth. The nodule has a halo. It demonstrates macrocalcifications. It has peripheral and scant internal vascularity. \par \par In the right lower pole there is a  predominantly solid, with isoechoic solid component. It measures 1.05 x 0.97 x 1.01 cm. The nodule is round in shape and the border is smooth. The nodule has a halo. It demonstrates macrocalcifications. It has peripheral and scant internal vascularity. \par

## 2023-07-26 NOTE — REASON FOR VISIT
[Follow - Up] : a follow-up visit [Home] : at home, [unfilled] , at the time of the visit. [Medical Office: ([facility])___] : at the medical office located in

## 2023-07-26 NOTE — PHYSICAL EXAM
[Alert] : alert [Well Nourished] : well nourished [Healthy Appearance] : healthy appearance [No Acute Distress] : no acute distress [Well Developed] : well developed [No Proptosis] : no proptosis [Normal Hearing] : hearing was normal [No Respiratory Distress] : no respiratory distress [No Accessory Muscle Use] : no accessory muscle use [No Edema] : no peripheral edema [Kyphosis] : no kyphosis present [No Stigmata of Cushings Syndrome] : no stigmata of Cushings Syndrome [No Clubbing, Cyanosis] : no clubbing  or cyanosis of the fingernails [Normal Strength/Tone] : muscle strength and tone were normal [Acanthosis Nigricans] : no acanthosis nigricans [No Motor Deficits] : the motor exam was normal [Oriented x3] : oriented to person, place, and time [Normal Affect] : the affect was normal [Normal Mood] : the mood was normal [de-identified] : +scar tissue in the neck

## 2023-07-26 NOTE — REVIEW OF SYSTEMS
[Fatigue] : fatigue [Recent Weight Gain (___ Lbs)] : no recent weight gain [Recent Weight Loss (___ Lbs)] : no recent weight loss [Visual Field Defect] : no visual field defect [Dysphagia] : no dysphagia [Dysphonia] : no dysphonia [Chest Pain] : no chest pain [Palpitations] : no palpitations [Shortness Of Breath] : no shortness of breath [Nausea] : no nausea [Constipation] : no constipation [Vomiting] : no vomiting [Diarrhea] : no diarrhea [Polyuria] : no polyuria [Joint Pain] : no joint pain [Headaches] : no headaches [Tremors] : no tremors [Insomnia] : insomnia [Polydipsia] : no polydipsia [Cold Intolerance] : no cold intolerance [Heat Intolerance] : no heat intolerance [All other systems negative] : All other systems negative

## 2023-07-26 NOTE — PROCEDURE
[[employer] e 2008 model, 10-12 MHz frequencies] : multiple real time longitudinal and transverse images were obtained using a high resolution ultrasound with a linear transducer, [employer] e 2008 model, 10-12 MHz frequencies. All measurements will be reported as longitudinal x scott-posterior x transverse. [Report dated ___] : Report dated [unfilled] [Multinodular Goiter] : multinodular goiter [] : a heterogeneous parenchyma  [Left Thyroid] : left [Lower] : lower pole there is a  [Isoechoic solid component] : with isoechoic solid component [Macrocalcifications] : macrocalcifications [Peripheral and scant  internal vascularity] : peripheral and scant internal vascularity [Predominantly Solid] : predominantly solid [Ovoid] : ovoid in shape [No calcifications] : no calcifications [No vascularity] : no vascularity [Right Thyroid] : right [Mid] : mid pole there is a  [Mixed] : mixed [Round] : round in shape [Smooth] : smooth [Yes] : has a halo [Peripheral vascularity] : peripheral vascularity [FreeTextEntry1] : 4.83 x 1.33 x 1.81 [FreeTextEntry5] : 3.42 x 1.40 x 1.57 [FreeTextEntry2] : 0.34 [FreeTextEntry3] : 0.74 x 1.06 x 0.83

## 2023-07-26 NOTE — ASSESSMENT
[FreeTextEntry1] : 48 y/o M w/multinodular goiter on Synthroid for radiation induced hypothyroidism and hypogonadism here for follow up.\par \par 1. Multinodular Goiter- Thyroid U/S FNA  performed 10/2016 for increased nodule size- results revealed multinodular goiter. Repeat thyroid US performed 10/2017, 9/2018, 9/2019, 5/2021 with stable nodules (see results section for full description). Repeat thyroid US at next visit.\par \par 2. Hypothyroidism- started on levothyroxine. Now on Synthroid 112 mcg. Clinically and chemically euthyroid. C/w Synthroid 112 mcg will adjust as needed. If TSH above goal would recommend 112 mcg daily with extra 1/2 tab on Sundays as 125 mcg was too high of a dose for him in the past with Free T4 of 2.0 (5/2021). \par \par 3. Hypogonadism- s/p HCG with trial of Clomid. Likely due to hx of anabolic steroid abuse in the past vs. radiation induced. Was on  testosterone injections 0.5ml every week. Tried Jatenzo for improved adherence and stability in levels, but was not covered. No need for Arimidex, would recommend lowering testosterone if needed to avoid hyperestrogenemia.  Now s/p Testopel 6/2023. Tolerated well.\par \par 5. Hypercalcemia- resolved on last lab work. Continue to monitor.\par \par Total visit time 30 min\par \par

## 2023-11-13 ENCOUNTER — APPOINTMENT (OUTPATIENT)
Dept: OTHER | Facility: CLINIC | Age: 48
End: 2023-11-13

## 2023-11-13 ENCOUNTER — RX RENEWAL (OUTPATIENT)
Age: 48
End: 2023-11-13

## 2023-11-13 ENCOUNTER — APPOINTMENT (OUTPATIENT)
Dept: RADIOLOGY | Facility: CLINIC | Age: 48
End: 2023-11-13
Payer: COMMERCIAL

## 2023-11-13 ENCOUNTER — APPOINTMENT (OUTPATIENT)
Dept: OTHER | Facility: CLINIC | Age: 48
End: 2023-11-13
Payer: COMMERCIAL

## 2023-11-13 VITALS
BODY MASS INDEX: 34.46 KG/M2 | WEIGHT: 260 LBS | RESPIRATION RATE: 15 BRPM | OXYGEN SATURATION: 95 % | DIASTOLIC BLOOD PRESSURE: 83 MMHG | HEIGHT: 73 IN | SYSTOLIC BLOOD PRESSURE: 127 MMHG | TEMPERATURE: 98.3 F | HEART RATE: 92 BPM

## 2023-11-13 DIAGNOSIS — G47.33 OBSTRUCTIVE SLEEP APNEA (ADULT) (PEDIATRIC): ICD-10-CM

## 2023-11-13 DIAGNOSIS — K21.9 GASTRO-ESOPHAGEAL REFLUX DISEASE W/OUT ESOPHAGITIS: ICD-10-CM

## 2023-11-13 DIAGNOSIS — G24.3 SPASMODIC TORTICOLLIS: ICD-10-CM

## 2023-11-13 DIAGNOSIS — Z04.9 ENCOUNTER FOR EXAMINATION AND OBSERVATION FOR UNSPECIFIED REASON: ICD-10-CM

## 2023-11-13 PROCEDURE — 99215 OFFICE O/P EST HI 40 MIN: CPT | Mod: 25

## 2023-11-13 PROCEDURE — 71046 X-RAY EXAM CHEST 2 VIEWS: CPT

## 2023-11-13 PROCEDURE — 90686 IIV4 VACC NO PRSV 0.5 ML IM: CPT

## 2023-11-13 PROCEDURE — 99396 PREV VISIT EST AGE 40-64: CPT | Mod: 25

## 2023-11-13 PROCEDURE — 94010 BREATHING CAPACITY TEST: CPT

## 2023-11-13 PROCEDURE — G0008: CPT

## 2023-11-13 RX ORDER — LIDOCAINE AND PRILOCAINE 25; 25 MG/G; MG/G
2.5-2.5 CREAM TOPICAL
Qty: 30 | Refills: 0 | Status: COMPLETED | COMMUNITY
Start: 2022-05-25 | End: 2023-11-13

## 2023-11-13 RX ORDER — OMEPRAZOLE 40 MG/1
40 CAPSULE, DELAYED RELEASE ORAL
Qty: 90 | Refills: 0 | Status: ACTIVE | COMMUNITY
Start: 2022-04-14

## 2023-11-13 RX ORDER — SUCRALFATE 1 G/1
1 TABLET ORAL
Qty: 180 | Refills: 0 | Status: ACTIVE | COMMUNITY
Start: 2021-12-07

## 2023-11-13 RX ORDER — FAMOTIDINE 10 MG/ML
20 INJECTION, SOLUTION INTRAVENOUS
Refills: 0 | Status: ACTIVE | COMMUNITY

## 2023-11-13 RX ORDER — TRIAMCINOLONE ACETONIDE 55 UG/1
55 SOLUTION/ DROPS OPHTHALMIC
Qty: 17 | Refills: 0 | Status: COMPLETED | COMMUNITY
Start: 2022-05-25 | End: 2023-11-13

## 2023-11-13 RX ORDER — CROMOLYN SODIUM 40 MG/ML
4 SOLUTION/ DROPS OPHTHALMIC
Qty: 10 | Refills: 0 | Status: COMPLETED | COMMUNITY
Start: 2022-05-25 | End: 2023-11-13

## 2023-11-13 RX ORDER — NEBIVOLOL HYDROCHLORIDE 5 MG/1
5 TABLET ORAL
Refills: 0 | Status: ACTIVE | COMMUNITY

## 2023-11-16 ENCOUNTER — APPOINTMENT (OUTPATIENT)
Dept: SURGERY | Facility: CLINIC | Age: 48
End: 2023-11-16
Payer: COMMERCIAL

## 2023-11-16 DIAGNOSIS — C76.0 MALIGNANT NEOPLASM OF HEAD, FACE AND NECK: ICD-10-CM

## 2023-11-16 PROCEDURE — 99214 OFFICE O/P EST MOD 30 MIN: CPT

## 2023-11-17 ENCOUNTER — APPOINTMENT (OUTPATIENT)
Dept: ENDOCRINOLOGY | Facility: CLINIC | Age: 48
End: 2023-11-17
Payer: COMMERCIAL

## 2023-11-17 VITALS — DIASTOLIC BLOOD PRESSURE: 86 MMHG | SYSTOLIC BLOOD PRESSURE: 140 MMHG | OXYGEN SATURATION: 97 % | HEART RATE: 85 BPM

## 2023-11-17 VITALS
DIASTOLIC BLOOD PRESSURE: 82 MMHG | OXYGEN SATURATION: 98 % | RESPIRATION RATE: 14 BRPM | HEART RATE: 85 BPM | WEIGHT: 260 LBS | BODY MASS INDEX: 34.46 KG/M2 | HEIGHT: 73 IN | SYSTOLIC BLOOD PRESSURE: 132 MMHG

## 2023-11-17 LAB
ALBUMIN SERPL ELPH-MCNC: 4.6 G/DL
ALP BLD-CCNC: 78 U/L
ALT SERPL-CCNC: 28 U/L
ANION GAP SERPL CALC-SCNC: 13 MMOL/L
APPEARANCE: CLEAR
AST SERPL-CCNC: 25 U/L
BACTERIA: NEGATIVE /HPF
BASOPHILS # BLD AUTO: 0.02 K/UL
BASOPHILS NFR BLD AUTO: 0.3 %
BILIRUB SERPL-MCNC: 0.3 MG/DL
BILIRUBIN URINE: NEGATIVE
BLOOD URINE: NEGATIVE
BUN SERPL-MCNC: 32 MG/DL
CALCIUM SERPL-MCNC: 9.5 MG/DL
CAST: 0 /LPF
CHLORIDE SERPL-SCNC: 103 MMOL/L
CHOLEST SERPL-MCNC: 204 MG/DL
CO2 SERPL-SCNC: 23 MMOL/L
COLOR: YELLOW
CREAT SERPL-MCNC: 1.48 MG/DL
EGFR: 58 ML/MIN/1.73M2
EOSINOPHIL # BLD AUTO: 0 K/UL
EOSINOPHIL NFR BLD AUTO: 0 %
EPITHELIAL CELLS: 0 /HPF
GLUCOSE QUALITATIVE U: NEGATIVE MG/DL
GLUCOSE SERPL-MCNC: 75 MG/DL
HCT VFR BLD CALC: 51.6 %
HDLC SERPL-MCNC: 47 MG/DL
HGB BLD-MCNC: 16.5 G/DL
IMM GRANULOCYTES NFR BLD AUTO: 0.7 %
KETONES URINE: NEGATIVE MG/DL
LDLC SERPL CALC-MCNC: 146 MG/DL
LEUKOCYTE ESTERASE URINE: NEGATIVE
LYMPHOCYTES # BLD AUTO: 1.27 K/UL
LYMPHOCYTES NFR BLD AUTO: 18 %
MAN DIFF?: NORMAL
MCHC RBC-ENTMCNC: 26.6 PG
MCHC RBC-ENTMCNC: 32 GM/DL
MCV RBC AUTO: 83.1 FL
MICROSCOPIC-UA: NORMAL
MONOCYTES # BLD AUTO: 0.63 K/UL
MONOCYTES NFR BLD AUTO: 8.9 %
NEUTROPHILS # BLD AUTO: 5.08 K/UL
NEUTROPHILS NFR BLD AUTO: 72.1 %
NITRITE URINE: NEGATIVE
NONHDLC SERPL-MCNC: 157 MG/DL
PH URINE: 6
PLATELET # BLD AUTO: 230 K/UL
POTASSIUM SERPL-SCNC: 4.5 MMOL/L
PROT SERPL-MCNC: 6.9 G/DL
PROTEIN URINE: NEGATIVE MG/DL
RBC # BLD: 6.21 M/UL
RBC # FLD: 18.6 %
RED BLOOD CELLS URINE: 0 /HPF
SODIUM SERPL-SCNC: 139 MMOL/L
SPECIFIC GRAVITY URINE: 1.01
TRIGL SERPL-MCNC: 62 MG/DL
UROBILINOGEN URINE: 0.2 MG/DL
WBC # FLD AUTO: 7.05 K/UL
WHITE BLOOD CELLS URINE: 0 /HPF

## 2023-11-17 PROCEDURE — 11980 IMPLANT HORMONE PELLET(S): CPT

## 2023-11-17 PROCEDURE — S0189: CPT | Mod: NC

## 2023-12-06 ENCOUNTER — NON-APPOINTMENT (OUTPATIENT)
Age: 48
End: 2023-12-06

## 2023-12-18 ENCOUNTER — RX RENEWAL (OUTPATIENT)
Age: 48
End: 2023-12-18

## 2023-12-18 ENCOUNTER — TRANSCRIPTION ENCOUNTER (OUTPATIENT)
Age: 48
End: 2023-12-18

## 2024-01-05 ENCOUNTER — TRANSCRIPTION ENCOUNTER (OUTPATIENT)
Age: 49
End: 2024-01-05

## 2024-01-26 ENCOUNTER — TRANSCRIPTION ENCOUNTER (OUTPATIENT)
Age: 49
End: 2024-01-26

## 2024-02-07 ENCOUNTER — APPOINTMENT (OUTPATIENT)
Dept: ENDOCRINOLOGY | Facility: CLINIC | Age: 49
End: 2024-02-07
Payer: COMMERCIAL

## 2024-02-07 VITALS
DIASTOLIC BLOOD PRESSURE: 75 MMHG | HEIGHT: 73 IN | WEIGHT: 254 LBS | OXYGEN SATURATION: 94 % | SYSTOLIC BLOOD PRESSURE: 149 MMHG | BODY MASS INDEX: 33.66 KG/M2 | HEART RATE: 90 BPM | TEMPERATURE: 98 F

## 2024-02-07 PROCEDURE — 99214 OFFICE O/P EST MOD 30 MIN: CPT

## 2024-02-07 NOTE — PHYSICAL EXAM
[Alert] : alert [Well Nourished] : well nourished [Healthy Appearance] : healthy appearance [No Acute Distress] : no acute distress [Well Developed] : well developed [No Proptosis] : no proptosis [Normal Hearing] : hearing was normal [No Respiratory Distress] : no respiratory distress [No Accessory Muscle Use] : no accessory muscle use [No Edema] : no peripheral edema [No Stigmata of Cushings Syndrome] : no stigmata of Cushings Syndrome [No Clubbing, Cyanosis] : no clubbing  or cyanosis of the fingernails [Normal Strength/Tone] : muscle strength and tone were normal [No Motor Deficits] : the motor exam was normal [Oriented x3] : oriented to person, place, and time [Normal Affect] : the affect was normal [Normal Mood] : the mood was normal

## 2024-02-07 NOTE — PROCEDURE
[[employer] e 2008 model, 10-12 MHz frequencies] : multiple real time longitudinal and transverse images were obtained using a high resolution ultrasound with a linear transducer, [employer] e 2008 model, 10-12 MHz frequencies. All measurements will be reported as longitudinal x scott-posterior x transverse. [Report dated ___] : Report dated [unfilled] [Multinodular Goiter] : multinodular goiter [] : a heterogeneous parenchyma  [Left Thyroid] : left [Lower] : lower pole there is a  [Isoechoic solid component] : with isoechoic solid component [Macrocalcifications] : macrocalcifications [Peripheral and scant  internal vascularity] : peripheral and scant internal vascularity [Predominantly Solid] : predominantly solid [Ovoid] : ovoid in shape [No calcifications] : no calcifications [No vascularity] : no vascularity [Right Thyroid] : right [Mid] : mid pole there is a  [Mixed] : mixed [Round] : round in shape [Smooth] : smooth [Yes] : has a halo [Peripheral vascularity] : peripheral vascularity

## 2024-02-07 NOTE — REASON FOR VISIT
[Follow - Up] : a follow-up visit [Hypothyroidism] : hypothyroidism [Thyroid nodule/ MNG] : thyroid nodule/ MNG [Hypogonadism] : hypogonadism

## 2024-03-15 ENCOUNTER — RX RENEWAL (OUTPATIENT)
Age: 49
End: 2024-03-15

## 2024-05-16 ENCOUNTER — APPOINTMENT (OUTPATIENT)
Dept: SURGERY | Facility: CLINIC | Age: 49
End: 2024-05-16

## 2024-05-20 ENCOUNTER — APPOINTMENT (OUTPATIENT)
Dept: ENDOCRINOLOGY | Facility: CLINIC | Age: 49
End: 2024-05-20
Payer: COMMERCIAL

## 2024-05-20 VITALS
HEART RATE: 81 BPM | SYSTOLIC BLOOD PRESSURE: 143 MMHG | BODY MASS INDEX: 33.53 KG/M2 | HEIGHT: 73 IN | OXYGEN SATURATION: 95 % | DIASTOLIC BLOOD PRESSURE: 84 MMHG | WEIGHT: 253 LBS

## 2024-05-20 DIAGNOSIS — E03.9 HYPOTHYROIDISM, UNSPECIFIED: ICD-10-CM

## 2024-05-20 DIAGNOSIS — E04.1 NONTOXIC SINGLE THYROID NODULE: ICD-10-CM

## 2024-05-20 DIAGNOSIS — E83.52 HYPERCALCEMIA: ICD-10-CM

## 2024-05-20 PROCEDURE — 99214 OFFICE O/P EST MOD 30 MIN: CPT

## 2024-05-20 RX ORDER — TESTOSTERONE 75 MG/1
75 PELLET SUBCUTANEOUS
Qty: 1 | Refills: 0 | Status: ACTIVE | COMMUNITY
Start: 2023-06-06

## 2024-05-20 NOTE — HISTORY OF PRESENT ILLNESS
[FreeTextEntry1] : 49 y/o M diagnosed with thyroid nodules in 2013 which were found incidentally on MRI which was performed for facial pain s/p surgery for squamous cell cancer, also with possible facial nerve damage as he has been experiencing frequent facial spasms unrelieved by Botox. Seen initially by me 3/29/16 with thyroid U/S confirming 2 lower pole right thyroid nodules with microcalcifications largest 1.1cm. s/p FNA 10/2016 with benign findings. Last thyroid US 6/2023 right nodular lobe measuring 4.6 x 2.4 1.5cm with nodules in that lobe measuring 1.4 x 1.2 x 11.1 cm in upper lobe, 1.6 x 1.5 1.1 in mid lobe and 0.7 x 0.6 x 0.5cm in lower lobe. Left nodule 1.6 x 1.2 x 2.7cm. Chemically with subclinical hypothyroidism at that time possibly secondary to radiation exposure from neck cancer so was started on levothyroxine 50 mcg daily. Dose increased to 88 mcg 7/2016. Dose again increased to 100 mcg after TSH found to be mildly elevated at 5.3 (10/2016). Repeat TFTs 1/2017 improved with TSH of 3.7.  TSH 4.86 dose increased to 112 mcg (10/2017). Repeat TSH 4.18 (1/2019) and remained chemically euthyroid until 5/2021 when Free T4 was 2.0 so dose was lowered back to 112 mcg. Takes Synthroid daily in am on empty stomach, eats breakfast 1 hour later. Denies missing doses. Last TSH above goal at 6.43 despite adherence dose increased to 112 mcg daily with extra 1/2 tab on Sundays but has been nonadherent at times. Has noticed some gastirtis symptoms after taking Advil for musculoskeletal pain. Seeing GI. Has also noticed some increased nocturia. Denies current fevers, chills, chest pain, SOB, abdominal pain, nausea, vomiting, diarrhea, constipation. Has noticed some increased anxiety, palpitations, heat intolerance at night, insomnia. Reports chronic dry skin on hands, but no recent changes. Unable to tolerate extremes in weather. Reports chronic dysphagia and chronic neck pain. Had been exposed to [facility] site. Has a history of radiation to the neck for treatment for squamous cell cancer - last RT August 2016. +weight gain with less activity now stable.   Of note, patient states that he follows with a urologist for history of nephrolithiasis; had his testosterone levels check for complaint of low energy and found to have low level. Underwent MRI head for further evaluation, was told that his pituitary was affected by prior RT, but also has a hx of anabolic steroid use. Was started on HCG injections weekly at the end of March 2017. Was on testosterone injection 200 mg every 2 weeks. Not looking to have children at this time. Has noticed increased mood swings. Recommended trial of Clomid 9/2018 with testosterone level of 230.9. Was taking 25 mg every other day. Had not noticed change in libido. Has been off Clomid since 1/2019 as insurance would not cover it. Also with persistent fatigue and low energy. Treated for sleep apnea with CPAP at night, but has been nonadherent. Testosterone level off replacement was low at 94. Started back on testosterone injections 0.5ml weekly.. Now s/p Testopel 6/2023 and 11/2023. Jatenzo was not covered. Urologist recommended Anastazole for elevated estrogen level on testosterone replacement. Stopped taking due to cramping in muscles. Recommended Testopel last dose 11/2023. Tolerated well.

## 2024-05-20 NOTE — PROCEDURE
[[employer] e 2008 model, 10-12 MHz frequencies] : multiple real time longitudinal and transverse images were obtained using a high resolution ultrasound with a linear transducer, [employer] 2008 model, 10-12 MHz frequencies. All measurements will be reported as longitudinal x scott-posterior x transverse. [Report dated ___] : Report dated [unfilled] [Multinodular Goiter] : multinodular goiter [] : a heterogeneous parenchyma  [Left Thyroid] : left [Lower] : lower pole there is a  [Isoechoic solid component] : with isoechoic solid component [Macrocalcifications] : macrocalcifications [Peripheral and scant  internal vascularity] : peripheral and scant internal vascularity [Predominantly Solid] : predominantly solid [Ovoid] : ovoid in shape [No calcifications] : no calcifications [No vascularity] : no vascularity [Right Thyroid] : right [Mid] : mid pole there is a  [Mixed] : mixed [Round] : round in shape [Smooth] : smooth [Yes] : has a halo [Peripheral vascularity] : peripheral vascularity [FreeTextEntry1] : 4.83 x 1.33 x 1.81 [FreeTextEntry5] : 3.42 x 1.40 x 1.57 [FreeTextEntry2] : 0.34 [FreeTextEntry3] : 0.74 x 1.06 x 0.83

## 2024-05-20 NOTE — DATA REVIEWED
[FreeTextEntry1] : Labs 1/2024:  TG 74 HDL 52 Chol 219 A1c 5.7% TSH 6.43 Hb 17.7 CMP normal Testosterone 518  Labs 7/2023: Testosterone approx 700  Thyroid US 6/2023 right nodular lobe measuring 4.6 x 2.4 1.5cm with nodules in that lobe measuring 1.4 x 1.2 x 11.1 cm in upper lobe, 1.6 x 1.5 1.1 in mid lobe and 0.7 x 0.6 x 0.5cm in lower lobe. Left nodule 1.6 x 1.2 x 2.7cm.  Labs 6/2023: CMP normal A1c 5.9% Testosterone 186 TSH 3.94  Labs 11/21/2022:  Chol 230 HDL 32  Cr 1.46 Hb 15.5  Labs 11/16/2022: TSH 4.56 CMP normal except Cr 1.54  Labs 6/2022: TSH 3.29  Labs 5/2021: A1c 5.4%  Chol 205 HDL 42  CMP normal PTH 26 Ca 9.8 TSH 3.57 Free T4 2.0  Labs 11/9/2021: TSH 2.6   Labs 11/5/2021: Hb 15.7 HCT 47.9  Labs 10/2021: Hb 16.8 HCT 51.8 Testosterone 94 CMP normal  Thyroid Ultrasound Report 5/26/2021:  Technique: multiple real time longitudinal and transverse images were obtained using a high resolution ultrasound with a linear transducer, USConnect e 2008 model, 10-12 MHz frequencies. All measurements will be reported as longitudinal x scott-posterior x transverse.  Comparison: Report dated 11/2019.   Indication: multinodular goiter.   Findings:  The right thyroid lobe measures 4.83 x 1.33 x 1.81 cm. The left thyroid lobe measures 3.42 x 1.40 x 1.57 cm. The isthmus measures 0.34 cm.  The right thyroid lobe has a heterogeneous parenchyma.  The left thyroid lobe has a heterogeneous parenchyma.    In the right lower pole there is a  predominantly solid, with isoechoic solid component. It measures 0.93 x 0.99 x 0.86 cm. The nodule is round in shape and the border is smooth. The nodule has a halo. It demonstrates macrocalcifications. It has no vascularity.   In the left lower pole there is a  mixed, predominantly solid, with isoechoic solid component. It measures 0.6 x 0.64 x 0.50 cm. The nodule is round in shape and the border is smooth. The nodule has a halo. It demonstrates macrocalcifications. It has peripheral and scant internal vascularity.   In the right mid pole there is a  predominantly solid. It measures 1.46 x 0.93 x 0.64 cm. The nodule is ovoid in shape and the border is smooth. The nodule has a halo. It demonstrates no calcifications. It has no vascularity.   In the right mid pole there is a  mixed. It measures 0.74 x 1.06 x 0.83 cm. The nodule is round in shape and the border is smooth. The nodule has a halo. It has peripheral vascularity.   Labs 4/2021: Phos 2.3 Ca 10.6 No albumin checked Glucose 66 BMP otherwise normal  Labs 12/8/2020: LH 4.4 FSH 1.6 TSH 1.93 Free T4 1.8 Testosterone 189  Labs 5/2020: TSH 3.73 Free T4 1.7 TPO Ab neg. LFTs normal  Thyroid Ultrasound Report 11/18/19:  Technique: multiple real time longitudinal and transverse images were obtained using a high resolution ultrasound with a linear transducer, USConnect e 2008 model, 10-12 MHz frequencies. All measurements will be reported as longitudinal x scott-posterior x transverse.  Comparison: Report dated 9/2018.   Indication: multinodular goiter.   Findings:  The right thyroid lobe measures 3.94 x 1.34 x 1.64 cm. The left thyroid lobe measures 3.51 x 1.44 x 1.66 cm. The isthmus measures 0.26 cm.  The right thyroid lobe has a heterogeneous parenchyma.    In the right lower pole there is a  predominantly solid, with isoechoic solid component. It measures 0.92 x 0.95 x 0.92 cm. The nodule is round in shape and the border is smooth. The nodule has a halo. It demonstrates macrocalcifications. It has no vascularity.   In the left lower pole there is a  mixed, predominantly solid, with isoechoic solid component. It measures 0.72 x 0.78 x 0.51 cm. The nodule is round in shape and the border is smooth. The nodule has a halo. It demonstrates macrocalcifications. It has peripheral and scant internal vascularity.   In the right mid pole there is a  predominantly solid. It measures 0.91 x 0.76 x 0.89 cm. The nodule is ovoid in shape and the border is smooth. The nodule has a halo. It demonstrates no calcifications. It has no vascularity.   In the right mid pole there is a  mixed. It measures 0.74 x 0.58 x 0.74 cm. The nodule is round in shape and the border is smooth. The nodule has a halo. It has peripheral vascularity.   Labs 11/2019: CBC normal CMP normal except glucose of 68 and mildly elevated AST ALT  HDL 49 Chol 209 Trig 166  Labs 7/15/19: A1c 5.8% CMP normal TSH 3.46 Free T4 1.8 Testosterone 198 LH 4.0 FSH 1.5  Labs 1/2019: TSH 4.18 Free T4 1.8 A1c 5.7% CMP normal Vit D 32  Labs 11/28/18: Cr 1.35 CMP normal  HDL 47 Chol 193 Trig 114  Thyroid Ultrasound Report 9/18/18:  Technique: multiple real time longitudinal and transverse images were obtained using a high resolution ultrasound with a linear transducer, USConnect e 2008 model, 10-12 MHz frequencies. All measurements will be reported as longitudinal x scott-posterior x transverse.  Comparison: Report dated 10/2017.   Indication: multinodular goiter.   Findings:  The right thyroid lobe measures 3.23 x 1.98 x 1.40 cm. The left thyroid lobe measures 3.51 x 1.44 x 1.66 cm. The isthmus measures 0.31 cm.  The right thyroid lobe has a heterogeneous parenchyma.  The left thyroid lobe has a heterogeneous parenchyma .    In the right lower pole there is a  predominantly solid, with isoechoic solid component. It measures 1.06 x 0.83 x 0.94 cm. The nodule is round in shape and the border is smooth. The nodule has a halo. It demonstrates macrocalcifications. It has peripheral and scant internal vascularity.   In the left lower pole there is a  mixed, predominantly solid, with isoechoic solid component. It measures 1.05 x 0.74 x 0.84 cm. The nodule is round in shape and the border is smooth. The nodule has a halo. It demonstrates macrocalcifications. It has peripheral and scant internal vascularity.   In the right mid pole there is a  predominantly solid. It measures 1.11 x 0.70 x 0.81 cm. The nodule is ovoid in shape and the border is smooth. The nodule has a halo. It demonstrates no calcifications. It has peripheral vascularity.   In the right mid pole there is a  mixed. It measures 0.83 x 0.82 x 1.05 cm. The nodule is round in shape and the border is smooth. The nodule has a halo. It has peripheral vascularity.    Labs 4/2018: TSH 4.31 Free T4 1.4  Labs 12/2017 Hb 17.7 CMP normal  HDL 44 Chol 241 Trig 77  Labs 10/2017: Cortisol 8.4 TSH 4.86 Free T4 1.4 ACTH 18 Prolactin 14.7 FSH <0.1 LH < 0.1 IGF-1 150 Testosterone 64.4   Thyroid Ultrasound Report 10/2017  Technique: multiple real time longitudinal and transverse images were obtained using a high resolution ultrasound with a linear transducer, USConnect e 2008 model, 10-12 MHz frequencies. All measurements will be reported as longitudinal x scott-posterior x transverse.  Comparison: Report dated 10/2016.   Indication: multinodular goiter.   Findings:  The right thyroid lobe measures 2.53 x 1.38 x 1.34 cm. The left thyroid lobe measures 3.41 x 1.18 x 0.99 cm. The isthmus measures 0.26 cm.  The right thyroid lobe has a heterogeneous parenchyma.  The left thyroid lobe has a heterogeneous parenchyma .    In the right lower pole there is a  predominantly solid, with isoechoic solid component. It measures 1.45 x 0.87 x 0.87 cm. The nodule is round in shape and the border is smooth. The nodule has a halo. It demonstrates macrocalcifications. It has peripheral and scant internal vascularity.   In the left lower pole there is a  mixed, predominantly solid, with isoechoic solid component. It measures 0.66 x 0.75 x 0.85 cm. The nodule is round in shape and the border is smooth. The nodule has a halo. It demonstrates macrocalcifications. It has peripheral and scant internal vascularity.  Labs 1/11/17:  HDL 40 Chol 227 Trig 98 Creatinine 1.36 CMP otherwise normal except AST of 51 TSH 3.70  Pathology Right Lower Pole Thyroid Nodule 10/2016: Benign  Thyroid Ultrasound Report 10/18/16:  Technique: multiple real time longitudinal and transverse images were obtained using a high resolution ultrasound with a linear transducer, GE LOGIQ e 2008 model, 10-12 MHz frequencies. All measurements will be reported as longitudinal x scott-posterior x transverse.  Comparison: Report dated 4/2016.   Indication: multinodular goiter.   Findings:  The right thyroid lobe measures 2.61 x 1.90 x 1.54 cm. The left thyroid lobe measures 3.1 x 1.3 x 1.26 cm. The isthmus measures 0.29 cm.  The right thyroid lobe has a heterogeneous parenchyma.  The left thyroid lobe has a heterogeneous parenchyma .    In the right lower pole there is a  predominantly solid, with isoechoic solid component. It measures 1.56 x 0.9 x 1.2 cm. The nodule is round in shape and the border is smooth. The nodule has a halo. It demonstrates macrocalcifications. It has peripheral and scant internal vascularity.   In the left lower pole there is a  mixed, predominantly solid, with isoechoic solid component. It measures 1.05 x 0.97 x 1.01 cm. The nodule is round in shape and the border is smooth. The nodule has a halo. It demonstrates macrocalcifications. It has peripheral and scant internal vascularity.   Labs 7/2016: TSH 8.45 Free T4 1.3 TPO Ab neg.  FNA Right Lower 4/2016: Benign  Labs 3/29/16: TSH 7.08 Free T4 1.4 Anti-TPO Ab negative  Thyroid Ultrasound Report 3/29/16:  Technique: multiple real time longitudinal and transverse images were obtained using a high resolution ultrasound with a linear transducer, USConnect e 2008 model, 10-12 MHz frequencies. All measurements will be reported as longitudinal x scott-posterior x transverse.  Comparison: None.   Indication: thyroid nodule.   Findings:  The right thyroid lobe measures 2.37 x 1.44 x 1.62 cm. The left thyroid lobe measures 2.11 x 1.18 x 1.39 cm. The isthmus measures 0.32 cm.  The right thyroid lobe has a heterogeneous parenchyma.  The left thyroid lobe has a heterogeneous parenchyma .    In the right lower pole there is a  predominantly solid, with isoechoic solid component. It measures 0.87 x 0.89 x 0.94 cm. The nodule is round in shape and the border is smooth. The nodule has a halo. It demonstrates macrocalcifications. It has peripheral and scant internal vascularity.   In the right lower pole there is a  predominantly solid, with isoechoic solid component. It measures 1.05 x 0.97 x 1.01 cm. The nodule is round in shape and the border is smooth. The nodule has a halo. It demonstrates macrocalcifications. It has peripheral and scant internal vascularity.

## 2024-05-20 NOTE — PHYSICAL EXAM
[Alert] : alert [Well Nourished] : well nourished [Healthy Appearance] : healthy appearance [No Acute Distress] : no acute distress [Well Developed] : well developed [No Proptosis] : no proptosis [Normal Hearing] : hearing was normal [No Respiratory Distress] : no respiratory distress [No Accessory Muscle Use] : no accessory muscle use [No Edema] : no peripheral edema [Normal Bowel Sounds] : normal bowel sounds [Not Tender] : non-tender [Not Distended] : not distended [Soft] : abdomen soft [No Stigmata of Cushings Syndrome] : no stigmata of Cushings Syndrome [No Clubbing, Cyanosis] : no clubbing  or cyanosis of the fingernails [Normal Strength/Tone] : muscle strength and tone were normal [No Motor Deficits] : the motor exam was normal [Oriented x3] : oriented to person, place, and time [Normal Affect] : the affect was normal [Normal Mood] : the mood was normal [Kyphosis] : no kyphosis present [Acanthosis Nigricans] : no acanthosis nigricans [de-identified] : +scar tissue in the neck

## 2024-05-20 NOTE — ASSESSMENT
[FreeTextEntry1] : 49 y/o M w/multinodular goiter on Synthroid for radiation induced hypothyroidism and hypogonadism here for follow up.  1. Multinodular Goiter- Thyroid U/S FNA  performed 10/2016 for increased nodule size- results revealed multinodular goiter. Repeat thyroid US performed 10/2017, 9/2018, 9/2019, 5/2021, 6/2023 with stable nodules (see results section for full description). Repeat thyroid US at next visit to assess for stability.   2. Hypothyroidism- started on levothyroxine. Now on Synthroid 112 mcg with non-adherence to extra 1/2 tab weekly. Check TSH now on 112 mcg daily with extra 1/2 tab on Sundays as 125 mcg was too high of a dose for him in the past with Free T4 of 2.0 (5/2021).   3. Hypogonadism- s/p HCG with trial of Clomid. Likely due to hx of anabolic steroid abuse in the past vs. radiation induced. Was on  testosterone injections 0.5ml every week. Tried Jatenzo for improved adherence and stability in levels, but was not covered. No need for Arimidex, would recommend lowering testosterone if needed to avoid hyperestrogenemia.  Now s/p Testopel 11/2023. Tolerated well. Plan for next pellet May or June 2024.   5. Hypercalcemia- resolved on last lab work. Continue to monitor.  Total visit time 30 min

## 2024-05-21 DIAGNOSIS — E78.49 OTHER HYPERLIPIDEMIA: ICD-10-CM

## 2024-05-21 LAB
ALBUMIN SERPL ELPH-MCNC: 4.4 G/DL
ALP BLD-CCNC: 69 U/L
ALT SERPL-CCNC: 31 U/L
ANION GAP SERPL CALC-SCNC: 15 MMOL/L
AST SERPL-CCNC: 39 U/L
BASOPHILS # BLD AUTO: 0.04 K/UL
BASOPHILS NFR BLD AUTO: 0.8 %
BILIRUB SERPL-MCNC: 0.5 MG/DL
BUN SERPL-MCNC: 14 MG/DL
CALCIUM SERPL-MCNC: 10.2 MG/DL
CHLORIDE SERPL-SCNC: 102 MMOL/L
CHOLEST SERPL-MCNC: 281 MG/DL
CO2 SERPL-SCNC: 21 MMOL/L
CREAT SERPL-MCNC: 1.45 MG/DL
EGFR: 60 ML/MIN/1.73M2
EOSINOPHIL # BLD AUTO: 0.01 K/UL
EOSINOPHIL NFR BLD AUTO: 0.2 %
ESTIMATED AVERAGE GLUCOSE: 108 MG/DL
GLUCOSE SERPL-MCNC: 82 MG/DL
HBA1C MFR BLD HPLC: 5.4 %
HCT VFR BLD CALC: 55 %
HDLC SERPL-MCNC: 33 MG/DL
HGB BLD-MCNC: 17 G/DL
IMM GRANULOCYTES NFR BLD AUTO: 0.2 %
LDLC SERPL CALC-MCNC: 220 MG/DL
LYMPHOCYTES # BLD AUTO: 1.34 K/UL
LYMPHOCYTES NFR BLD AUTO: 27.6 %
MAN DIFF?: NORMAL
MCHC RBC-ENTMCNC: 27.1 PG
MCHC RBC-ENTMCNC: 30.9 GM/DL
MCV RBC AUTO: 87.6 FL
MONOCYTES # BLD AUTO: 0.36 K/UL
MONOCYTES NFR BLD AUTO: 7.4 %
NEUTROPHILS # BLD AUTO: 3.1 K/UL
NEUTROPHILS NFR BLD AUTO: 63.8 %
NONHDLC SERPL-MCNC: 248 MG/DL
PLATELET # BLD AUTO: 300 K/UL
POTASSIUM SERPL-SCNC: 5.1 MMOL/L
PROT SERPL-MCNC: 7.1 G/DL
RBC # BLD: 6.28 M/UL
RBC # FLD: 19.2 %
SODIUM SERPL-SCNC: 138 MMOL/L
T4 FREE SERPL-MCNC: 1.8 NG/DL
TESTOST SERPL-MCNC: 15.3 NG/DL
TRIGL SERPL-MCNC: 150 MG/DL
TSH SERPL-ACNC: 2.87 UIU/ML
WBC # FLD AUTO: 4.86 K/UL

## 2024-05-21 RX ORDER — ATORVASTATIN CALCIUM 40 MG/1
40 TABLET, FILM COATED ORAL
Qty: 1 | Refills: 3 | Status: ACTIVE | COMMUNITY
Start: 2024-05-21 | End: 1900-01-01

## 2024-05-31 ENCOUNTER — APPOINTMENT (OUTPATIENT)
Dept: ENDOCRINOLOGY | Facility: CLINIC | Age: 49
End: 2024-05-31
Payer: COMMERCIAL

## 2024-05-31 VITALS
SYSTOLIC BLOOD PRESSURE: 120 MMHG | DIASTOLIC BLOOD PRESSURE: 80 MMHG | HEART RATE: 80 BPM | RESPIRATION RATE: 14 BRPM | OXYGEN SATURATION: 96 %

## 2024-05-31 VITALS
HEIGHT: 73 IN | HEART RATE: 84 BPM | OXYGEN SATURATION: 97 % | BODY MASS INDEX: 33.53 KG/M2 | DIASTOLIC BLOOD PRESSURE: 70 MMHG | SYSTOLIC BLOOD PRESSURE: 98 MMHG | WEIGHT: 253 LBS

## 2024-05-31 DIAGNOSIS — E23.0 HYPOPITUITARISM: ICD-10-CM

## 2024-05-31 PROCEDURE — 11980 IMPLANT HORMONE PELLET(S): CPT

## 2024-05-31 PROCEDURE — S0189: CPT | Mod: NC

## 2024-05-31 NOTE — PROCEDURE
[FreeTextEntry1] : The patient was prepped and draped in a sterile manner, 9 mL of 1% lidocaine (with epinephrine) was injected using a needle along the right lateral and upper buttocks. 10 Testopel pellets were implanted without difficulty  Exp: 4/2026 Lot:  S/N: [id number] GTIN: [id number]  15 minutes after the procedure the patient was examined. Blood pressure and pulse were obtained and were at his baseline. There was minimal blood stain on the dressing. Discharge instructions were reviewed. He was discharged home in excellent condition. An appointment was made for followup in 6 weeks. He will call with any questions or concerns.

## 2024-07-10 ENCOUNTER — APPOINTMENT (OUTPATIENT)
Dept: ENDOCRINOLOGY | Facility: CLINIC | Age: 49
End: 2024-07-10
Payer: COMMERCIAL

## 2024-07-10 DIAGNOSIS — E23.0 HYPOPITUITARISM: ICD-10-CM

## 2024-07-10 DIAGNOSIS — E04.1 NONTOXIC SINGLE THYROID NODULE: ICD-10-CM

## 2024-07-10 DIAGNOSIS — E03.9 HYPOTHYROIDISM, UNSPECIFIED: ICD-10-CM

## 2024-07-10 PROCEDURE — 99214 OFFICE O/P EST MOD 30 MIN: CPT

## 2024-07-26 ENCOUNTER — TRANSCRIPTION ENCOUNTER (OUTPATIENT)
Age: 49
End: 2024-07-26

## 2024-07-31 ENCOUNTER — TRANSCRIPTION ENCOUNTER (OUTPATIENT)
Age: 49
End: 2024-07-31

## 2024-09-05 NOTE — PROCEDURE
Preparing for Your Surgery      Name:  Mirta Cardenas   MRN:  [id number]   :  1956   Today's Date:  2024       Arriving for surgery:  Surgery date:  24  Arrival time:  10:20 am    Please come to:     [facility] [address] [facility]   (Address takes you to the [facility].)  [address] [place] [address]  [id number]     The Green Ramp for patients and visitors is beneath the [facility]. The parking facility entrance is at the intersection of [address] [place] [address] and [address]. Patients and visitors who self-park will receive the reduced hospital parking rate (no ticket validation needed).   [employer] parking, located at the [facility] main entrance on [address] [place] [address], is available Monday - Friday from 7 am to 3:30 pm.   Discounted parking pass options can be purchased from  attendants during business hours.     -Check in at the security desk in the [facility] ([facility])   Lobby. They will direct you to the correct elevators.   -Proceed to the 3rd floor, Adult Surgery Waiting Lounge. [phone]     If you need directions, a wheelchair or escort please stop at the Information Desk in the lobby.  Inform the information person you are here for surgery; a wheelchair and escort to [address] will be provided.   An escort to the Adult Surgery Waiting Lounge will be provided. .    What can I eat or drink?  -  You may eat and drink normally up to 8 hours prior to arrival time. (Until 2:20 am)  -  You may have clear liquids until 2 hours prior to arrival time. (Until 8:20 am)    Examples of clear liquids:  Water  Clear broth  Juices (apple, white grape, white cranberry  and cider) without pulp  Noncarbonated, powder based beverages  (lemonade and Anant-Aid)  Sodas (Sprite, 7-Up, ginger ale and seltzer)  Coffee or tea (without milk or cream)  Gatorade    -  No Alcohol  or cannabis products for at least 24 hours before surgery.     Which medicines can I take?  Hold Aspirin for 7 days before surgery.   Hold Multivitamins for 7 days before surgery. Hold Multivitamin starting now if you have not already, and hold until surgery.  Hold Supplements for 7 days before surgery.  Hold Ibuprofen (Advil, Motrin) for 1 day before surgery--unless otherwise directed by surgeon.  Hold Naproxen (Aleve) for 4 days before surgery.  Acetaminophen (Tylenol) is okay to take if needed.    -  DO NOT take these medications the day of surgery:  Hydrochlorothiazide (Hydrodiuril)      -  PLEASE TAKE these medications the day of surgery:  Amlodipine (Norvasc)  Omeprazole (Prilosec)  Pregabalin (Lyrica)  Sulfamethoxazole-Trimethoprim (Bactrim DS)  Acetaminophen (Tylenol) if needed    How do I prepare myself?  - Please take 2 showers (one the night prior to surgery and one the morning of surgery) using Scrubcare or Hibiclens soap.    You may use your own shampoo and conditioner. No other hair products.     Use this soap only from the neck to your toes.    Leave the soap on your skin for one minute--then rinse thoroughly.   - Please remove all jewelry and body piercings.  - No lotions, deodorants or fragrance.  - No makeup or fingernail polish.   - Bring your ID and insurance card.    -If you use a CPAP machine, please bring the CPAP machine, tubing, and mask to hospital.    -If you have a Deep Brain Stimulator, Spinal Cord Stimulator, or any Neuro Stimulator device---you must bring the remote control to the hospital.      ALL PATIENTS GOING HOME THE SAME DAY OF SURGERY ARE REQUIRED TO HAVE A RESPONSIBLE ADULT TO DRIVE AND BE IN ATTENDANCE WITH THEM FOR 24 HOURS FOLLOWING SURGERY.    Covid testing policy as of 12/06/2022  Your surgeon will notify and schedule you for a COVID test if one is needed before surgery--please direct any questions or COVID symptoms to your surgeon      Questions or Concerns:    - For any  questions regarding the day of surgery or your hospital stay, please contact the Pre Admission Nursing Office at [phone].       - If you have health changes between today and your surgery, please call your surgeon.       - For questions after surgery, please call your surgeons office.           Current Visitor Guidelines    You may have 2 visitors in the pre op area.    Visiting hours: 8 a.m. to 8:30 p.m.    Patients confirmed or suspected to have symptoms of COVID 19 or flu:     No visitors allowed for adult patients.   Children (under age 18) can have 1 named visitor.     People who are sick or showing symptoms of COVID 19 or flu:    Are not allowed to visit patients--we can only make exceptions in special situations.       Please follow these guidelines for your visit:          Please maintain social distance          Masking is optional--however at times you may be asked to wear a mask for the safety of yourself and others     Clean your hands with alcohol hand . Do this when you arrive at and leave the building and patient room,    And again after you touch your mask or anything in the room.     Go directly to and from the room you are visiting.     Stay in the patient s room during your visit. Limit going to other places in the hospital as much as possible     Leave bags and jackets at home or in the car.     For everyone s health, please don t come and go during your visit. That includes for smoking   during your visit.            [[employer] e 2008 model, 10-12 MHz frequencies] : multiple real time longitudinal and transverse images were obtained using a high resolution ultrasound with a linear transducer, [employer] e 2008 model, 10-12 MHz frequencies. All measurements will be reported as longitudinal x scott-posterior x transverse. [Report dated ___] : Report dated [unfilled] [Multinodular Goiter] : multinodular goiter [] : a heterogeneous parenchyma  [Left Thyroid] : left [Lower] : lower pole there is a  [Isoechoic solid component] : with isoechoic solid component [Macrocalcifications] : macrocalcifications [Peripheral and scant  internal vascularity] : peripheral and scant internal vascularity [Predominantly Solid] : predominantly solid [Ovoid] : ovoid in shape [No calcifications] : no calcifications [No vascularity] : no vascularity [Right Thyroid] : right [Mid] : mid pole there is a  [Mixed] : mixed [Round] : round in shape [Smooth] : smooth [Yes] : has a halo [Peripheral vascularity] : peripheral vascularity [FreeTextEntry1] : 4.83 x 1.33 x 1.81 [FreeTextEntry5] : 3.42 x 1.40 x 1.57 [FreeTextEntry2] : 0.34 [FreeTextEntry3] : 0.74 x 1.06 x 0.83

## 2024-09-13 ENCOUNTER — APPOINTMENT (OUTPATIENT)
Dept: ENDOCRINOLOGY | Facility: CLINIC | Age: 49
End: 2024-09-13
Payer: COMMERCIAL

## 2024-09-13 VITALS
BODY MASS INDEX: 33.53 KG/M2 | HEIGHT: 73 IN | HEART RATE: 78 BPM | WEIGHT: 253 LBS | SYSTOLIC BLOOD PRESSURE: 130 MMHG | OXYGEN SATURATION: 98 % | DIASTOLIC BLOOD PRESSURE: 80 MMHG

## 2024-09-13 VITALS — HEART RATE: 84 BPM | SYSTOLIC BLOOD PRESSURE: 104 MMHG | OXYGEN SATURATION: 96 % | DIASTOLIC BLOOD PRESSURE: 70 MMHG

## 2024-09-13 DIAGNOSIS — E23.0 HYPOPITUITARISM: ICD-10-CM

## 2024-09-13 PROCEDURE — 11980 IMPLANT HORMONE PELLET(S): CPT

## 2024-09-13 PROCEDURE — S0189: CPT | Mod: NC

## 2024-09-13 NOTE — PROCEDURE
[FreeTextEntry1] : The patient was prepped and draped in a sterile manner, 9 mL of 1% lidocaine (with epinephrine) was injected using a needle along the right lateral and upper buttocks. 10 Testopel pellets were implanted without difficulty  Exp: 7/2026 Lot:  S/N: [id number] GTIN: [id number]  15 minutes after the procedure the patient was examined. Blood pressure and pulse were obtained and were at his baseline. There was minimal blood stain on the dressing. Discharge instructions were reviewed. He was discharged home in excellent condition. An appointment was made for followup in 6 weeks. He will call with any questions or concerns.

## 2024-09-30 ENCOUNTER — NON-APPOINTMENT (OUTPATIENT)
Age: 49
End: 2024-09-30

## 2024-11-12 ENCOUNTER — APPOINTMENT (OUTPATIENT)
Dept: OTHER | Facility: CLINIC | Age: 49
End: 2024-11-12
Payer: COMMERCIAL

## 2024-11-12 VITALS
WEIGHT: 258 LBS | BODY MASS INDEX: 34.19 KG/M2 | TEMPERATURE: 98.2 F | HEART RATE: 83 BPM | SYSTOLIC BLOOD PRESSURE: 115 MMHG | RESPIRATION RATE: 17 BRPM | HEIGHT: 73 IN | DIASTOLIC BLOOD PRESSURE: 77 MMHG | OXYGEN SATURATION: 95 %

## 2024-11-12 DIAGNOSIS — G24.3 SPASMODIC TORTICOLLIS: ICD-10-CM

## 2024-11-12 DIAGNOSIS — G47.33 OBSTRUCTIVE SLEEP APNEA (ADULT) (PEDIATRIC): ICD-10-CM

## 2024-11-12 DIAGNOSIS — Z04.9 ENCOUNTER FOR EXAMINATION AND OBSERVATION FOR UNSPECIFIED REASON: ICD-10-CM

## 2024-11-12 DIAGNOSIS — K21.9 GASTRO-ESOPHAGEAL REFLUX DISEASE W/OUT ESOPHAGITIS: ICD-10-CM

## 2024-11-12 PROCEDURE — 99396 PREV VISIT EST AGE 40-64: CPT | Mod: 25

## 2024-11-12 PROCEDURE — 90686 IIV4 VACC NO PRSV 0.5 ML IM: CPT

## 2024-11-12 PROCEDURE — 94010 BREATHING CAPACITY TEST: CPT

## 2024-11-12 PROCEDURE — G0008: CPT

## 2024-11-12 PROCEDURE — 99215 OFFICE O/P EST HI 40 MIN: CPT | Mod: 25

## 2024-11-13 ENCOUNTER — APPOINTMENT (OUTPATIENT)
Dept: ENDOCRINOLOGY | Facility: CLINIC | Age: 49
End: 2024-11-13
Payer: COMMERCIAL

## 2024-11-13 ENCOUNTER — NON-APPOINTMENT (OUTPATIENT)
Age: 49
End: 2024-11-13

## 2024-11-13 VITALS
DIASTOLIC BLOOD PRESSURE: 85 MMHG | OXYGEN SATURATION: 96 % | BODY MASS INDEX: 33.31 KG/M2 | TEMPERATURE: 97.8 F | SYSTOLIC BLOOD PRESSURE: 140 MMHG | WEIGHT: 251.38 LBS | HEART RATE: 79 BPM | HEIGHT: 73 IN | RESPIRATION RATE: 17 BRPM

## 2024-11-13 DIAGNOSIS — E34.9 ENDOCRINE DISORDER, UNSPECIFIED: ICD-10-CM

## 2024-11-13 DIAGNOSIS — E03.9 HYPOTHYROIDISM, UNSPECIFIED: ICD-10-CM

## 2024-11-13 DIAGNOSIS — E83.52 HYPERCALCEMIA: ICD-10-CM

## 2024-11-13 DIAGNOSIS — E04.1 NONTOXIC SINGLE THYROID NODULE: ICD-10-CM

## 2024-11-13 PROCEDURE — 99214 OFFICE O/P EST MOD 30 MIN: CPT

## 2024-11-14 LAB
ESTIMATED AVERAGE GLUCOSE: 114 MG/DL
HBA1C MFR BLD HPLC: 5.6 %
T4 FREE SERPL-MCNC: 1.6 NG/DL
TESTOST SERPL-MCNC: 497 NG/DL
TSH SERPL-ACNC: 2.93 UIU/ML

## 2024-11-18 LAB
ALBUMIN SERPL ELPH-MCNC: 4.3 G/DL
ALP BLD-CCNC: 92 U/L
ALT SERPL-CCNC: 21 U/L
ANION GAP SERPL CALC-SCNC: 13 MMOL/L
APPEARANCE: CLEAR
AST SERPL-CCNC: 26 U/L
BACTERIA: NEGATIVE /HPF
BASOPHILS # BLD AUTO: 0.02 K/UL
BASOPHILS NFR BLD AUTO: 0.5 %
BILIRUB SERPL-MCNC: 0.3 MG/DL
BILIRUBIN URINE: NEGATIVE
BLOOD URINE: NEGATIVE
BUN SERPL-MCNC: 22 MG/DL
CALCIUM SERPL-MCNC: 9.9 MG/DL
CAST: 0 /LPF
CHLORIDE SERPL-SCNC: 103 MMOL/L
CHOLEST SERPL-MCNC: 200 MG/DL
CO2 SERPL-SCNC: 22 MMOL/L
COLOR: YELLOW
CREAT SERPL-MCNC: 1.27 MG/DL
EGFR: 70 ML/MIN/1.73M2
EOSINOPHIL # BLD AUTO: 0.02 K/UL
EOSINOPHIL NFR BLD AUTO: 0.5 %
EPITHELIAL CELLS: 0 /HPF
GLUCOSE QUALITATIVE U: NEGATIVE MG/DL
GLUCOSE SERPL-MCNC: 105 MG/DL
HCT VFR BLD CALC: 53.4 %
HDLC SERPL-MCNC: 45 MG/DL
HGB BLD-MCNC: 16.7 G/DL
IMM GRANULOCYTES NFR BLD AUTO: 0 %
KETONES URINE: NEGATIVE MG/DL
LDLC SERPL CALC-MCNC: 132 MG/DL
LEUKOCYTE ESTERASE URINE: NEGATIVE
LYMPHOCYTES # BLD AUTO: 1.29 K/UL
LYMPHOCYTES NFR BLD AUTO: 31.2 %
MAN DIFF?: NORMAL
MCHC RBC-ENTMCNC: 26.1 PG
MCHC RBC-ENTMCNC: 31.3 G/DL
MCV RBC AUTO: 83.3 FL
MICROSCOPIC-UA: NORMAL
MONOCYTES # BLD AUTO: 0.41 K/UL
MONOCYTES NFR BLD AUTO: 9.9 %
NEUTROPHILS # BLD AUTO: 2.4 K/UL
NEUTROPHILS NFR BLD AUTO: 57.9 %
NITRITE URINE: NEGATIVE
NONHDLC SERPL-MCNC: 155 MG/DL
PH URINE: 6
PLATELET # BLD AUTO: 222 K/UL
POTASSIUM SERPL-SCNC: 4.3 MMOL/L
PROT SERPL-MCNC: 6.7 G/DL
PROTEIN URINE: NEGATIVE MG/DL
RBC # BLD: 6.41 M/UL
RBC # FLD: 18 %
RED BLOOD CELLS URINE: 0 /HPF
SODIUM SERPL-SCNC: 137 MMOL/L
SPECIFIC GRAVITY URINE: 1.02
TRIGL SERPL-MCNC: 126 MG/DL
UROBILINOGEN URINE: 0.2 MG/DL
WBC # FLD AUTO: 4.14 K/UL
WHITE BLOOD CELLS URINE: 0 /HPF

## 2024-12-02 ENCOUNTER — NON-APPOINTMENT (OUTPATIENT)
Age: 49
End: 2024-12-02

## 2024-12-03 ENCOUNTER — TRANSCRIPTION ENCOUNTER (OUTPATIENT)
Age: 49
End: 2024-12-03

## 2024-12-03 RX ORDER — TADALAFIL 10 MG/1
10 TABLET, FILM COATED ORAL
Qty: 30 | Refills: 0 | Status: ACTIVE | COMMUNITY
Start: 2024-12-03 | End: 1900-01-01

## 2024-12-30 ENCOUNTER — NON-APPOINTMENT (OUTPATIENT)
Age: 49
End: 2024-12-30

## 2025-01-21 ENCOUNTER — RX RENEWAL (OUTPATIENT)
Age: 50
End: 2025-01-21

## 2025-01-27 ENCOUNTER — TRANSCRIPTION ENCOUNTER (OUTPATIENT)
Age: 50
End: 2025-01-27

## 2025-01-28 ENCOUNTER — RX RENEWAL (OUTPATIENT)
Age: 50
End: 2025-01-28

## 2025-03-19 ENCOUNTER — RX RENEWAL (OUTPATIENT)
Age: 50
End: 2025-03-19

## 2025-04-21 ENCOUNTER — TRANSCRIPTION ENCOUNTER (OUTPATIENT)
Age: 50
End: 2025-04-21

## 2025-04-22 ENCOUNTER — TRANSCRIPTION ENCOUNTER (OUTPATIENT)
Age: 50
End: 2025-04-22

## 2025-05-07 ENCOUNTER — NON-APPOINTMENT (OUTPATIENT)
Age: 50
End: 2025-05-07

## 2025-05-14 ENCOUNTER — NON-APPOINTMENT (OUTPATIENT)
Age: 50
End: 2025-05-14

## 2025-05-14 ENCOUNTER — TRANSCRIPTION ENCOUNTER (OUTPATIENT)
Age: 50
End: 2025-05-14

## 2025-05-27 ENCOUNTER — APPOINTMENT (OUTPATIENT)
Dept: ENDOCRINOLOGY | Facility: CLINIC | Age: 50
End: 2025-05-27
Payer: COMMERCIAL

## 2025-05-27 VITALS
DIASTOLIC BLOOD PRESSURE: 70 MMHG | SYSTOLIC BLOOD PRESSURE: 104 MMHG | HEIGHT: 73 IN | BODY MASS INDEX: 32.87 KG/M2 | HEART RATE: 72 BPM | OXYGEN SATURATION: 99 % | WEIGHT: 248 LBS

## 2025-05-27 DIAGNOSIS — E04.1 NONTOXIC SINGLE THYROID NODULE: ICD-10-CM

## 2025-05-27 DIAGNOSIS — E23.0 HYPOPITUITARISM: ICD-10-CM

## 2025-05-27 DIAGNOSIS — E03.9 HYPOTHYROIDISM, UNSPECIFIED: ICD-10-CM

## 2025-05-27 DIAGNOSIS — E83.52 HYPERCALCEMIA: ICD-10-CM

## 2025-05-27 PROCEDURE — S0189: CPT | Mod: NC

## 2025-05-27 PROCEDURE — 99214 OFFICE O/P EST MOD 30 MIN: CPT | Mod: 25

## 2025-05-27 PROCEDURE — 11980 IMPLANT HORMONE PELLET(S): CPT

## 2025-05-29 LAB
24R-OH-CALCIDIOL SERPL-MCNC: 44.4 PG/ML
25(OH)D3 SERPL-MCNC: 45.9 NG/ML
ALBUMIN SERPL ELPH-MCNC: 4.3 G/DL
ALP BLD-CCNC: 95 U/L
ALT SERPL-CCNC: 30 U/L
ANION GAP SERPL CALC-SCNC: 13 MMOL/L
AST SERPL-CCNC: 24 U/L
BASOPHILS # BLD AUTO: 0.03 K/UL
BASOPHILS NFR BLD AUTO: 0.5 %
BILIRUB SERPL-MCNC: 0.4 MG/DL
BUN SERPL-MCNC: 23 MG/DL
CALCIUM SERPL-MCNC: 9.6 MG/DL
CALCIUM SERPL-MCNC: 9.6 MG/DL
CHLORIDE SERPL-SCNC: 104 MMOL/L
CHOLEST SERPL-MCNC: 207 MG/DL
CO2 SERPL-SCNC: 24 MMOL/L
CREAT SERPL-MCNC: 1.32 MG/DL
EGFRCR SERPLBLD CKD-EPI 2021: 67 ML/MIN/1.73M2
EOSINOPHIL # BLD AUTO: 0.02 K/UL
EOSINOPHIL NFR BLD AUTO: 0.4 %
ESTIMATED AVERAGE GLUCOSE: 111 MG/DL
FSH SERPL-MCNC: 2.5 IU/L
GLUCOSE SERPL-MCNC: 99 MG/DL
HBA1C MFR BLD HPLC: 5.5 %
HCT VFR BLD CALC: 49.2 %
HDLC SERPL-MCNC: 58 MG/DL
HGB BLD-MCNC: 16.3 G/DL
IMM GRANULOCYTES NFR BLD AUTO: 0.4 %
LDLC SERPL-MCNC: 134 MG/DL
LH SERPL-ACNC: 3.9 IU/L
LYMPHOCYTES # BLD AUTO: 2 K/UL
LYMPHOCYTES NFR BLD AUTO: 35.2 %
MAN DIFF?: NORMAL
MCHC RBC-ENTMCNC: 29.1 PG
MCHC RBC-ENTMCNC: 33.1 G/DL
MCV RBC AUTO: 87.7 FL
MONOCYTES # BLD AUTO: 0.46 K/UL
MONOCYTES NFR BLD AUTO: 8.1 %
NEUTROPHILS # BLD AUTO: 3.15 K/UL
NEUTROPHILS NFR BLD AUTO: 55.4 %
NONHDLC SERPL-MCNC: 150 MG/DL
PARATHYROID HORMONE INTACT: 24 PG/ML
PLATELET # BLD AUTO: 205 K/UL
POTASSIUM SERPL-SCNC: 4.9 MMOL/L
PROT SERPL-MCNC: 6.8 G/DL
RBC # BLD: 5.61 M/UL
RBC # FLD: 13.3 %
SODIUM SERPL-SCNC: 141 MMOL/L
T3 SERPL-MCNC: 119 NG/DL
T4 FREE SERPL-MCNC: 1.8 NG/DL
TESTOST SERPL-MCNC: 187 NG/DL
TRIGL SERPL-MCNC: 86 MG/DL
TSH SERPL-ACNC: 3.22 UIU/ML
WBC # FLD AUTO: 5.68 K/UL

## 2025-06-23 ENCOUNTER — RX RENEWAL (OUTPATIENT)
Age: 50
End: 2025-06-23

## 2025-07-16 ENCOUNTER — APPOINTMENT (OUTPATIENT)
Dept: ENDOCRINOLOGY | Facility: CLINIC | Age: 50
End: 2025-07-16
Payer: COMMERCIAL

## 2025-07-16 PROCEDURE — 99214 OFFICE O/P EST MOD 30 MIN: CPT | Mod: 95

## 2025-07-16 PROCEDURE — G2211 COMPLEX E/M VISIT ADD ON: CPT | Mod: NC,95
